# Patient Record
Sex: FEMALE | Race: BLACK OR AFRICAN AMERICAN | Employment: OTHER | URBAN - METROPOLITAN AREA
[De-identification: names, ages, dates, MRNs, and addresses within clinical notes are randomized per-mention and may not be internally consistent; named-entity substitution may affect disease eponyms.]

---

## 2017-07-31 ENCOUNTER — IP HISTORICAL/CONVERTED ENCOUNTER (OUTPATIENT)
Dept: OTHER | Age: 77
End: 2017-07-31

## 2019-09-09 ENCOUNTER — ED HISTORICAL/CONVERTED ENCOUNTER (OUTPATIENT)
Dept: OTHER | Age: 79
End: 2019-09-09

## 2020-01-01 ENCOUNTER — TELEPHONE (OUTPATIENT)
Dept: INTERNAL MEDICINE CLINIC | Age: 80
End: 2020-01-01

## 2020-01-01 ENCOUNTER — IP HISTORICAL/CONVERTED ENCOUNTER (OUTPATIENT)
Dept: OTHER | Age: 80
End: 2020-01-01

## 2020-01-01 ENCOUNTER — VIRTUAL VISIT (OUTPATIENT)
Dept: INTERNAL MEDICINE CLINIC | Age: 80
End: 2020-01-01
Payer: MEDICARE

## 2020-01-01 DIAGNOSIS — R32 URINARY INCONTINENCE, UNSPECIFIED TYPE: ICD-10-CM

## 2020-01-01 DIAGNOSIS — E78.00 PURE HYPERCHOLESTEROLEMIA: ICD-10-CM

## 2020-01-01 DIAGNOSIS — Z86.73 HISTORY OF CVA (CEREBROVASCULAR ACCIDENT): ICD-10-CM

## 2020-01-01 DIAGNOSIS — I10 ESSENTIAL HYPERTENSION: Primary | ICD-10-CM

## 2020-01-01 DIAGNOSIS — E11.65 UNCONTROLLED TYPE 2 DIABETES MELLITUS WITH HYPERGLYCEMIA (HCC): ICD-10-CM

## 2020-01-01 DIAGNOSIS — Z99.3 DEPENDENCE ON WHEELCHAIR: ICD-10-CM

## 2020-01-01 DIAGNOSIS — E55.9 VITAMIN D DEFICIENCY: ICD-10-CM

## 2020-01-01 PROCEDURE — G8427 DOCREV CUR MEDS BY ELIG CLIN: HCPCS | Performed by: INTERNAL MEDICINE

## 2020-01-01 PROCEDURE — G8432 DEP SCR NOT DOC, RNG: HCPCS | Performed by: INTERNAL MEDICINE

## 2020-01-01 PROCEDURE — 1101F PT FALLS ASSESS-DOCD LE1/YR: CPT | Performed by: INTERNAL MEDICINE

## 2020-01-01 PROCEDURE — 1090F PRES/ABSN URINE INCON ASSESS: CPT | Performed by: INTERNAL MEDICINE

## 2020-01-01 PROCEDURE — G8400 PT W/DXA NO RESULTS DOC: HCPCS | Performed by: INTERNAL MEDICINE

## 2020-01-01 PROCEDURE — G8536 NO DOC ELDER MAL SCRN: HCPCS | Performed by: INTERNAL MEDICINE

## 2020-01-01 PROCEDURE — G8421 BMI NOT CALCULATED: HCPCS | Performed by: INTERNAL MEDICINE

## 2020-01-01 PROCEDURE — 99214 OFFICE O/P EST MOD 30 MIN: CPT | Performed by: INTERNAL MEDICINE

## 2020-01-01 RX ORDER — CLOPIDOGREL BISULFATE 75 MG/1
TABLET ORAL
Qty: 90 TAB | Refills: 0 | Status: SHIPPED | OUTPATIENT
Start: 2020-01-01 | End: 2020-01-01

## 2020-01-01 RX ORDER — OMEPRAZOLE 40 MG/1
40 CAPSULE, DELAYED RELEASE ORAL DAILY
COMMUNITY
End: 2020-01-01

## 2020-01-01 RX ORDER — FUROSEMIDE 40 MG/1
1 TABLET ORAL DAILY
COMMUNITY
Start: 2017-08-25

## 2020-01-01 RX ORDER — HYDRALAZINE HYDROCHLORIDE 50 MG/1
50 TABLET, FILM COATED ORAL 2 TIMES DAILY
Qty: 180 TAB | Refills: 1 | Status: SHIPPED | OUTPATIENT
Start: 2020-01-01

## 2020-01-01 RX ORDER — GLIPIZIDE 10 MG/1
10 TABLET ORAL 2 TIMES DAILY
COMMUNITY

## 2020-01-01 RX ORDER — HYDRALAZINE HYDROCHLORIDE 25 MG/1
1 TABLET, FILM COATED ORAL 2 TIMES DAILY
COMMUNITY
Start: 2017-08-25 | End: 2021-01-01

## 2020-01-01 RX ORDER — LOSARTAN POTASSIUM 50 MG/1
1 TABLET ORAL DAILY
COMMUNITY
Start: 2017-08-25

## 2020-01-01 RX ORDER — ATORVASTATIN CALCIUM 40 MG/1
TABLET, FILM COATED ORAL DAILY
COMMUNITY
End: 2020-01-01

## 2020-01-01 RX ORDER — ATENOLOL 50 MG/1
TABLET ORAL DAILY
COMMUNITY
End: 2020-01-01

## 2020-01-01 RX ORDER — AMLODIPINE BESYLATE 10 MG/1
1 TABLET ORAL DAILY
COMMUNITY
Start: 2017-08-25 | End: 2020-01-01

## 2020-09-10 NOTE — TELEPHONE ENCOUNTER
He stated that the previous letter written to get her additional hours of care was not sufficient. He stated they actually need two different letters to submit to her insurance to try and get additional help. One letter needs to state she requires more care than the current 45 hours they provide. Because she cannot walk her do any of her own ADL's it is impossible most times for even one person to move her. The other letter is to request modifications to her home. She currently only has 1/2 bath and it is very difficult to wash her or get her to the restroom. He is asking that the letter specify this is for fall prevention and to make sure the patient is cared for considering her ailments.

## 2020-10-05 PROBLEM — R32 URINARY INCONTINENCE: Status: ACTIVE | Noted: 2020-01-01

## 2020-10-05 PROBLEM — Z99.3 DEPENDENCE ON WHEELCHAIR: Status: ACTIVE | Noted: 2020-01-01

## 2020-10-05 PROBLEM — I10 ESSENTIAL HYPERTENSION: Status: ACTIVE | Noted: 2020-01-01

## 2020-10-05 PROBLEM — Z86.73 HISTORY OF CVA (CEREBROVASCULAR ACCIDENT): Status: ACTIVE | Noted: 2020-01-01

## 2020-10-05 PROBLEM — E66.01 MORBID OBESITY (HCC): Status: ACTIVE | Noted: 2020-01-01

## 2020-10-05 PROBLEM — E78.00 PURE HYPERCHOLESTEROLEMIA: Status: ACTIVE | Noted: 2020-01-01

## 2020-10-05 PROBLEM — E11.9 DM (DIABETES MELLITUS), TYPE 2 (HCC): Status: ACTIVE | Noted: 2020-01-01

## 2020-10-06 PROBLEM — E55.9 VITAMIN D DEFICIENCY: Status: ACTIVE | Noted: 2020-01-01

## 2020-10-06 NOTE — PROGRESS NOTES
Deacon Kiran is a 78 y.o. female who was seen by synchronous (real-time) audio-video technology on 10/6/2020 for Follow Up Chronic Condition; Cholesterol Problem; Diabetes; and Hypertension She has done her virtual visit with the help of her daughter her daughter, and she lived together, recently her caretaker had some problems that she could not come for 2 weeks so her daughter took off until sixth October for 2 weeks from  23 September patient has stroke, causing left-sided hemiparesis, she has become compliant taking medicines she claims that she is taking artificial sweetener, however her  blood sugar today is 168 before breakfast she did not start taking metformin, she is taking only glipizide, she does not check blood sugar 3 times a day no blood sugar log available,, I explained the various options patient has not done any ophthalmologic checkup or, podiatric checkup being diabetic, also I rely on what they check, sugar and blood pressure at home and it is the single sugar reading number her last hemoglobin A1c was 9.3% and her triglyceride was 247 with total cholesterol 207 and . It was done in April some of the labs were done in March, her blood pressure is elevated so I increase hydralazine from 25 to 50 mg once a day and continued on losartan 50 mg once a day and amlodipine 10 mg once a day along with atenolol 50 mg once a day and she takes Lasix 40 mg once a day she takes clopidogrel 75 mg once a day along with statin 40 mg once a day I have inherited this patient from Dr. Misti Tellez,, patient is not providing reliable history regarding her diabetic diet she has sedentary life she is wheelchair dependent she cannot walk she has hemiparesis on the left side her daughter wants FMLA forms and form that she stayed out of work to be filled out by me. She needs labs and she agreed. Subjective:  
 
 
Review of Systems Review of Systems Constitutional: Negative.    
HENT: Negative for congestion and hearing loss. Eyes: Negative. Respiratory: Negative. Cardiovascular: Negative. Gastrointestinal: Negative. Genitourinary: Negative. Musculoskeletal: Negative. Skin: Negative. Neurological: Negative for dizziness, tingling, tremors, sensory change, speech change and headaches. Endo/Heme/Allergies: Negative for polydipsia. Psychiatric/Behavioral: Negative for depression. The patient does not have insomnia. Prior to Admission medications Medication Sig Start Date End Date Taking? Authorizing Provider  
hydrALAZINE (APRESOLINE) 50 mg tablet Take 1 Tab by mouth two (2) times a day. Dose increased from 25 to 50 mg twice a day  Indications: high blood pressure 10/6/20  Yes Kathy Landis MD  
losartan (COZAAR) 50 mg tablet Take 1 Tab by mouth daily. 8/25/17   Provider, Historical  
hydrALAZINE (APRESOLINE) 25 mg tablet Take 1 Tab by mouth two (2) times a day. 8/25/17   Provider, Historical  
amLODIPine (NORVASC) 10 mg tablet Take 1 Tab by mouth daily. 8/25/17   Provider, Historical  
furosemide (LASIX) 40 mg tablet Take 1 Tab by mouth daily. 8/25/17   Provider, Historical  
atenoloL (TENORMIN) 50 mg tablet Take  by mouth daily. Provider, Historical  
omeprazole (PRILOSEC) 40 mg capsule Take 40 mg by mouth daily. Provider, Historical  
atorvastatin (LIPITOR) 40 mg tablet Take  by mouth daily. Provider, Historical  
glipiZIDE (GLUCOTROL) 10 mg tablet Take 10 mg by mouth two (2) times a day. Provider, Historical  
clopidogreL (PLAVIX) 75 mg tab TAKE ONE TABLET BY MOUTH DAILY. 9/21/20   Kathy Landis MD  
 
Patient Active Problem List  
 Diagnosis Date Noted  Vitamin D deficiency 10/06/2020  
 History of CVA (cerebrovascular accident) 10/05/2020  Essential hypertension 10/05/2020  Dependence on wheelchair 10/05/2020  Morbid obesity ([address] 10/05/2020  Pure hypercholesterolemia 10/05/2020  Urinary incontinence 10/05/2020  DM (diabetes mellitus), type 2 ([facility] 10/05/2020 Current Outpatient Medications Medication Sig Dispense Refill  hydrALAZINE (APRESOLINE) 50 mg tablet Take 1 Tab by mouth two (2) times a day. Dose increased from 25 to 50 mg twice a day  Indications: high blood pressure 180 Tab 1  
 losartan (COZAAR) 50 mg tablet Take 1 Tab by mouth daily.  hydrALAZINE (APRESOLINE) 25 mg tablet Take 1 Tab by mouth two (2) times a day.  amLODIPine (NORVASC) 10 mg tablet Take 1 Tab by mouth daily.  furosemide (LASIX) 40 mg tablet Take 1 Tab by mouth daily.  atenoloL (TENORMIN) 50 mg tablet Take  by mouth daily.  omeprazole (PRILOSEC) 40 mg capsule Take 40 mg by mouth daily.  atorvastatin (LIPITOR) 40 mg tablet Take  by mouth daily.  glipiZIDE (GLUCOTROL) 10 mg tablet Take 10 mg by mouth two (2) times a day.  clopidogreL (PLAVIX) 75 mg tab TAKE ONE TABLET BY MOUTH DAILY. 90 Tab 0 Allergies Allergen Reactions  Sulfa (Sulfonamide Antibiotics) Angioedema  Contrast Dye [Iodine] Itching Past Medical History:  
Diagnosis Date  Arthritis  Congestive heart failure ([facility]  Dependence on wheelchair 10/5/2020  DM (diabetes mellitus), type 2 ([facility] 10/5/2020  Essential hypertension 10/5/2020  
 History of CVA (cerebrovascular accident) 10/5/2020  Morbid obesity ([facility] 10/5/2020  Pure hypercholesterolemia 10/5/2020  Urinary incontinence 10/5/2020 No past surgical history on file. Family History Family history unknown: Yes  
 
Social History Tobacco Use  Smoking status: Former Smoker  Tobacco comment: QUIT 50 YEARS AGO Substance Use Topics  Alcohol use: Not on file Objective: She is lying in the bed, she lives with her daughter she needs, help for maintaining her ADL and IADL having left-sided hemiparesis from CVA I could not  see her below the face she is smiling and having pleasant, face, talking with me, providing history, also her daughter is helping her,. No distress, alert awake oriented 3 times Pertinent observable physical exam findings:- 
 
 
Assessment & Plan:  
Diagnoses and all orders for this visit: 1. Essential hypertension 
-     CBC WITH AUTOMATED DIFF 
-     METABOLIC PANEL, COMPREHENSIVE 
-     TSH 3RD GENERATION 2. Uncontrolled type 2 diabetes mellitus with hyperglycemia (HCC) 
-     HEMOGLOBIN A1C WITH EAG 
-     URINALYSIS W/ RFLX MICROSCOPIC 
-     TSH 3RD GENERATION 
-     MICROALBUMIN, UR, RAND W/ MICROALB/CREAT RATIO 3. Pure hypercholesterolemia -     LIPID PANEL 4. History of CVA (cerebrovascular accident) 5. Urinary incontinence, unspecified type 6. Dependence on wheelchair 7. Vitamin D deficiency 
-     VITAMIN D, 25 HYDROXY Other orders 
-     hydrALAZINE (APRESOLINE) 50 mg tablet; Take 1 Tab by mouth two (2) times a day. Dose increased from 25 to 50 mg twice a day  Indications: high blood pressure Follow-up and Dispositions · Return in about 3 months (around 1/6/2021) for htn ,yogesh ,diabetes ,fasting labs now. Hypertension uncontrolled continued on atenolol 50 mg once a day, losartan 50 mg once a day hydralazine increased to 50 mg twice a day,. Continue amlodipine 10 mg once a day taking furosemide 40 mg once a day. Diet low in sodium and labs ordered.  
 
Type 2 diabetes mellitus uncontrolled she did not start taking metformin taking glipizide 10 mg twice a day 30 minutes before eating no blood sugar log available she checked her blood sugar, on the date that I did virtual visit today and her fasting blood sugar is 168 no log available ,no reliable dietary history or food diary available she has hyperglycemia and her last hemoglobin A1c was 9.3% in April, I will be okay with her hemoglobin A1c up to 8% she is already having stroke, labs ordered, she needs ophthalmologic checkup, she needs podiatric checkup, she needs once a year office visit,Strongly recommended and emphasized to have office visit at least once a year she needs before December, I explained to her daughter. Hypercholesteremia with CVA affecting left side of extremities continue clopidogrel 75 mg once a day and atorvastatin 40 mg once a day at bedtime. Diet low in fat. Labs ordered. I explained to her daughter who lives with her and who needs [facility] papers that she needs house physicians or somebody where she can have transportation available because her daughter claims that, she has difficulty in bringing to the office she is wheelchair dependent however she needs at least once a year office visit, she will inquire with her insurance,. I filled out the form for her daughter that she stayed out of work from 23rd September until sixth October to take care of her mother who is having stroke, independent for ADL and IADL because her own caretaker was not that she had some personal work and she stayed off from taking care of her mother patient  needs help for transportation and bringing her to the office visit also for, helping her ADL and  and some of the IADL she is alert awake oriented 3 times, however she needs, help to maintain personal hygiene and cleanliness and to protect her from, prevention of infection. Answered all her questions. Follow-up in 3 months. I spent at least 35 minutes on this visit with this established patient. We discussed the expected course, resolution and complications of the diagnosis(es) in detail. Medication risks, benefits, costs, interactions, and alternatives were discussed as indicated. I advised her to contact the office if her condition worsens, changes or fails to improve as anticipated. She expressed understanding with the diagnosis(es) and plan.   
 
 
Renée Ruiz, who was evaluated through a patient-initiated, synchronous (real-time) audio-video encounter, and/or her healthcare decision maker, is aware that it is a billable service, with coverage as determined by her insurance carrier. She provided verbal consent to proceed: Yes, and patient identification was verified. It was conducted pursuant to the emergency declaration under the [address] [place] [address] and the [employer]. A caregiver was present when appropriate. Ability to conduct physical exam was limited. I was at home. The patient was in the office.  
 
 
Dorita Villa MD

## 2021-01-01 ENCOUNTER — APPOINTMENT (OUTPATIENT)
Dept: GENERAL RADIOLOGY | Age: 81
DRG: 177 | End: 2021-01-01
Attending: INTERNAL MEDICINE
Payer: MEDICARE

## 2021-01-01 ENCOUNTER — HOSPITAL ENCOUNTER (INPATIENT)
Age: 81
LOS: 9 days | DRG: 177 | End: 2021-02-06
Attending: EMERGENCY MEDICINE | Admitting: INTERNAL MEDICINE
Payer: MEDICARE

## 2021-01-01 ENCOUNTER — APPOINTMENT (OUTPATIENT)
Dept: GENERAL RADIOLOGY | Age: 81
DRG: 177 | End: 2021-01-01
Attending: EMERGENCY MEDICINE
Payer: MEDICARE

## 2021-01-01 ENCOUNTER — HOSPITAL ENCOUNTER (INPATIENT)
Age: 81
LOS: 4 days | Discharge: HOME HEALTH | DRG: 177 | End: 2021-01-26
Attending: EMERGENCY MEDICINE | Admitting: HOSPITALIST
Payer: MEDICARE

## 2021-01-01 ENCOUNTER — APPOINTMENT (OUTPATIENT)
Dept: CT IMAGING | Age: 81
DRG: 177 | End: 2021-01-01
Attending: INTERNAL MEDICINE
Payer: MEDICARE

## 2021-01-01 VITALS
OXYGEN SATURATION: 93 % | SYSTOLIC BLOOD PRESSURE: 163 MMHG | TEMPERATURE: 98.9 F | WEIGHT: 280 LBS | HEART RATE: 74 BPM | DIASTOLIC BLOOD PRESSURE: 94 MMHG | HEIGHT: 64 IN | RESPIRATION RATE: 18 BRPM | BODY MASS INDEX: 47.8 KG/M2

## 2021-01-01 VITALS
HEART RATE: 103 BPM | RESPIRATION RATE: 24 BRPM | BODY MASS INDEX: 47.12 KG/M2 | HEIGHT: 64 IN | OXYGEN SATURATION: 77 % | DIASTOLIC BLOOD PRESSURE: 81 MMHG | WEIGHT: 276.02 LBS | SYSTOLIC BLOOD PRESSURE: 159 MMHG | TEMPERATURE: 98.3 F

## 2021-01-01 DIAGNOSIS — U07.1 ACUTE RESPIRATORY FAILURE DUE TO COVID-19 (HCC): ICD-10-CM

## 2021-01-01 DIAGNOSIS — Z86.73 HISTORY OF CVA (CEREBROVASCULAR ACCIDENT): ICD-10-CM

## 2021-01-01 DIAGNOSIS — J12.82 PNEUMONIA DUE TO COVID-19 VIRUS: Primary | ICD-10-CM

## 2021-01-01 DIAGNOSIS — U07.1 PNEUMONIA DUE TO COVID-19 VIRUS: Primary | ICD-10-CM

## 2021-01-01 DIAGNOSIS — N17.9 AKI (ACUTE KIDNEY INJURY) (HCC): ICD-10-CM

## 2021-01-01 DIAGNOSIS — U07.1 COVID-19: ICD-10-CM

## 2021-01-01 DIAGNOSIS — R79.89 ELEVATED D-DIMER: ICD-10-CM

## 2021-01-01 DIAGNOSIS — R09.02 HYPOXIA: ICD-10-CM

## 2021-01-01 DIAGNOSIS — U07.1 COVID-19 VIRUS INFECTION: Primary | ICD-10-CM

## 2021-01-01 DIAGNOSIS — R32 URINARY INCONTINENCE, UNSPECIFIED TYPE: ICD-10-CM

## 2021-01-01 DIAGNOSIS — J96.00 ACUTE RESPIRATORY FAILURE DUE TO COVID-19 (HCC): ICD-10-CM

## 2021-01-01 DIAGNOSIS — J18.9 PNEUMONIA DUE TO INFECTIOUS ORGANISM, UNSPECIFIED LATERALITY, UNSPECIFIED PART OF LUNG: ICD-10-CM

## 2021-01-01 DIAGNOSIS — E66.01 MORBID OBESITY (HCC): ICD-10-CM

## 2021-01-01 DIAGNOSIS — Z99.3 DEPENDENCE ON WHEELCHAIR: ICD-10-CM

## 2021-01-01 LAB
ALBUMIN SERPL-MCNC: 2.9 G/DL (ref 3.5–5)
ALBUMIN SERPL-MCNC: 3.1 G/DL (ref 3.5–5)
ALBUMIN SERPL-MCNC: 3.1 G/DL (ref 3.5–5)
ALBUMIN SERPL-MCNC: 3.2 G/DL (ref 3.5–5)
ALBUMIN SERPL-MCNC: 3.5 G/DL (ref 3.5–5)
ALBUMIN/GLOB SERPL: 0.7 {RATIO} (ref 1.1–2.2)
ALBUMIN/GLOB SERPL: 0.8 {RATIO} (ref 1.1–2.2)
ALBUMIN/GLOB SERPL: 0.9 {RATIO} (ref 1.1–2.2)
ALP SERPL-CCNC: 68 U/L (ref 45–117)
ALP SERPL-CCNC: 78 U/L (ref 45–117)
ALP SERPL-CCNC: 85 U/L (ref 45–117)
ALT SERPL-CCNC: 22 U/L (ref 12–78)
ALT SERPL-CCNC: 22 U/L (ref 12–78)
ALT SERPL-CCNC: 26 U/L (ref 12–78)
ANION GAP SERPL CALC-SCNC: 10 MMOL/L (ref 5–15)
ANION GAP SERPL CALC-SCNC: 10 MMOL/L (ref 5–15)
ANION GAP SERPL CALC-SCNC: 7 MMOL/L (ref 5–15)
ANION GAP SERPL CALC-SCNC: 7 MMOL/L (ref 5–15)
ANION GAP SERPL CALC-SCNC: 8 MMOL/L (ref 5–15)
ANION GAP SERPL CALC-SCNC: 9 MMOL/L (ref 5–15)
ANION GAP SERPL CALC-SCNC: 9 MMOL/L (ref 5–15)
APTT PPP: 30.6 SEC (ref 23–35.7)
AST SERPL W P-5'-P-CCNC: 22 U/L (ref 15–37)
AST SERPL W P-5'-P-CCNC: 23 U/L (ref 15–37)
AST SERPL W P-5'-P-CCNC: 32 U/L (ref 15–37)
ATRIAL RATE: 102 BPM
ATRIAL RATE: 113 BPM
ATRIAL RATE: 70 BPM
BACTERIA SPEC CULT: ABNORMAL
BACTERIA SPEC CULT: ABNORMAL
BACTERIA SPEC CULT: NORMAL
BACTERIA SPEC CULT: NORMAL
BASOPHILS # BLD: 0 K/UL (ref 0–0.1)
BASOPHILS NFR BLD: 0 % (ref 0–1)
BILIRUB SERPL-MCNC: 0.5 MG/DL (ref 0.2–1)
BILIRUB SERPL-MCNC: 0.8 MG/DL (ref 0.2–1)
BILIRUB SERPL-MCNC: 1.3 MG/DL (ref 0.2–1)
BNP SERPL-MCNC: 1026 PG/ML
BNP SERPL-MCNC: 2346 PG/ML
BNP SERPL-MCNC: 355 PG/ML
BNP SERPL-MCNC: 609 PG/ML
BUN SERPL-MCNC: 12 MG/DL (ref 6–20)
BUN SERPL-MCNC: 30 MG/DL (ref 6–20)
BUN SERPL-MCNC: 38 MG/DL (ref 6–20)
BUN SERPL-MCNC: 41 MG/DL (ref 6–20)
BUN SERPL-MCNC: 46 MG/DL (ref 6–20)
BUN SERPL-MCNC: 47 MG/DL (ref 6–20)
BUN SERPL-MCNC: 52 MG/DL (ref 6–20)
BUN SERPL-MCNC: 55 MG/DL (ref 6–20)
BUN SERPL-MCNC: 77 MG/DL (ref 6–20)
BUN/CREAT SERPL: 12 (ref 12–20)
BUN/CREAT SERPL: 19 (ref 12–20)
BUN/CREAT SERPL: 26 (ref 12–20)
BUN/CREAT SERPL: 27 (ref 12–20)
BUN/CREAT SERPL: 31 (ref 12–20)
BUN/CREAT SERPL: 35 (ref 12–20)
BUN/CREAT SERPL: 41 (ref 12–20)
CA-I BLD-MCNC: 8.9 MG/DL (ref 8.5–10.1)
CA-I BLD-MCNC: 9.1 MG/DL (ref 8.5–10.1)
CA-I BLD-MCNC: 9.1 MG/DL (ref 8.5–10.1)
CA-I BLD-MCNC: 9.2 MG/DL (ref 8.5–10.1)
CA-I BLD-MCNC: 9.2 MG/DL (ref 8.5–10.1)
CA-I BLD-MCNC: 9.4 MG/DL (ref 8.5–10.1)
CA-I BLD-MCNC: 9.4 MG/DL (ref 8.5–10.1)
CA-I BLD-MCNC: 9.5 MG/DL (ref 8.5–10.1)
CA-I BLD-MCNC: 9.9 MG/DL (ref 8.5–10.1)
CALCULATED P AXIS, ECG09: 83 DEGREES
CALCULATED R AXIS, ECG10: -43 DEGREES
CALCULATED R AXIS, ECG10: -46 DEGREES
CALCULATED R AXIS, ECG10: -48 DEGREES
CALCULATED T AXIS, ECG11: 109 DEGREES
CALCULATED T AXIS, ECG11: 46 DEGREES
CALCULATED T AXIS, ECG11: 82 DEGREES
CHLORIDE SERPL-SCNC: 100 MMOL/L (ref 97–108)
CHLORIDE SERPL-SCNC: 102 MMOL/L (ref 97–108)
CHLORIDE SERPL-SCNC: 104 MMOL/L (ref 97–108)
CHLORIDE SERPL-SCNC: 105 MMOL/L (ref 97–108)
CHLORIDE SERPL-SCNC: 106 MMOL/L (ref 97–108)
CHLORIDE SERPL-SCNC: 108 MMOL/L (ref 97–108)
CHLORIDE SERPL-SCNC: 110 MMOL/L (ref 97–108)
CO2 SERPL-SCNC: 24 MMOL/L (ref 21–32)
CO2 SERPL-SCNC: 25 MMOL/L (ref 21–32)
CO2 SERPL-SCNC: 25 MMOL/L (ref 21–32)
CO2 SERPL-SCNC: 26 MMOL/L (ref 21–32)
CO2 SERPL-SCNC: 26 MMOL/L (ref 21–32)
CO2 SERPL-SCNC: 29 MMOL/L (ref 21–32)
CO2 SERPL-SCNC: 30 MMOL/L (ref 21–32)
CO2 SERPL-SCNC: 30 MMOL/L (ref 21–32)
CO2 SERPL-SCNC: 31 MMOL/L (ref 21–32)
COVID-19 RAPID TEST, COVR: DETECTED
CREAT SERPL-MCNC: 1.04 MG/DL (ref 0.55–1.02)
CREAT SERPL-MCNC: 1.33 MG/DL (ref 0.55–1.02)
CREAT SERPL-MCNC: 1.44 MG/DL (ref 0.55–1.02)
CREAT SERPL-MCNC: 1.47 MG/DL (ref 0.55–1.02)
CREAT SERPL-MCNC: 1.5 MG/DL (ref 0.55–1.02)
CREAT SERPL-MCNC: 1.5 MG/DL (ref 0.55–1.02)
CREAT SERPL-MCNC: 1.56 MG/DL (ref 0.55–1.02)
CREAT SERPL-MCNC: 1.61 MG/DL (ref 0.55–1.02)
CREAT SERPL-MCNC: 1.9 MG/DL (ref 0.55–1.02)
CRP SERPL-MCNC: 1.35 MG/DL (ref 0–0.6)
CRP SERPL-MCNC: 1.85 MG/DL (ref 0–0.6)
CRP SERPL-MCNC: 2.87 MG/DL (ref 0–0.6)
CRP SERPL-MCNC: 4.87 MG/DL (ref 0–0.6)
CRP SERPL-MCNC: <0.29 MG/DL (ref 0–0.6)
D DIMER PPP FEU-MCNC: 3.22 UG/ML(FEU)
DIAGNOSIS, 93000: NORMAL
DIFFERENTIAL METHOD BLD: ABNORMAL
EOSINOPHIL # BLD: 0 K/UL (ref 0–0.4)
EOSINOPHIL NFR BLD: 0 % (ref 0–7)
ERYTHROCYTE [DISTWIDTH] IN BLOOD BY AUTOMATED COUNT: 14.5 % (ref 11.5–14.5)
ERYTHROCYTE [DISTWIDTH] IN BLOOD BY AUTOMATED COUNT: 14.6 % (ref 11.5–14.5)
ERYTHROCYTE [DISTWIDTH] IN BLOOD BY AUTOMATED COUNT: 14.8 % (ref 11.5–14.5)
ERYTHROCYTE [DISTWIDTH] IN BLOOD BY AUTOMATED COUNT: 14.9 % (ref 11.5–14.5)
ERYTHROCYTE [DISTWIDTH] IN BLOOD BY AUTOMATED COUNT: 14.9 % (ref 11.5–14.5)
ERYTHROCYTE [DISTWIDTH] IN BLOOD BY AUTOMATED COUNT: 15 % (ref 11.5–14.5)
ERYTHROCYTE [DISTWIDTH] IN BLOOD BY AUTOMATED COUNT: 15.2 % (ref 11.5–14.5)
ERYTHROCYTE [DISTWIDTH] IN BLOOD BY AUTOMATED COUNT: 15.7 % (ref 11.5–14.5)
EST. AVERAGE GLUCOSE BLD GHB EST-MCNC: 154 MG/DL
FERRITIN SERPL-MCNC: 235 NG/ML (ref 8–252)
FERRITIN SERPL-MCNC: 333 NG/ML (ref 8–252)
FERRITIN SERPL-MCNC: 730 NG/ML (ref 8–252)
FLUAV AG NPH QL IA: NEGATIVE
FLUBV AG NOSE QL IA: NEGATIVE
GLOBULIN SER CALC-MCNC: 3.7 G/DL (ref 2–4)
GLOBULIN SER CALC-MCNC: 4.3 G/DL (ref 2–4)
GLOBULIN SER CALC-MCNC: 4.5 G/DL (ref 2–4)
GLUCOSE BLD STRIP.AUTO-MCNC: 155 MG/DL (ref 65–100)
GLUCOSE BLD STRIP.AUTO-MCNC: 155 MG/DL (ref 65–100)
GLUCOSE BLD STRIP.AUTO-MCNC: 207 MG/DL (ref 65–100)
GLUCOSE BLD STRIP.AUTO-MCNC: 228 MG/DL (ref 65–100)
GLUCOSE BLD STRIP.AUTO-MCNC: 232 MG/DL (ref 65–100)
GLUCOSE BLD STRIP.AUTO-MCNC: 232 MG/DL (ref 65–100)
GLUCOSE BLD STRIP.AUTO-MCNC: 239 MG/DL (ref 65–100)
GLUCOSE BLD STRIP.AUTO-MCNC: 241 MG/DL (ref 65–100)
GLUCOSE BLD STRIP.AUTO-MCNC: 241 MG/DL (ref 65–100)
GLUCOSE BLD STRIP.AUTO-MCNC: 242 MG/DL (ref 65–100)
GLUCOSE BLD STRIP.AUTO-MCNC: 243 MG/DL (ref 65–100)
GLUCOSE BLD STRIP.AUTO-MCNC: 244 MG/DL (ref 65–100)
GLUCOSE BLD STRIP.AUTO-MCNC: 246 MG/DL (ref 65–100)
GLUCOSE BLD STRIP.AUTO-MCNC: 248 MG/DL (ref 65–100)
GLUCOSE BLD STRIP.AUTO-MCNC: 249 MG/DL (ref 65–100)
GLUCOSE BLD STRIP.AUTO-MCNC: 251 MG/DL (ref 65–100)
GLUCOSE BLD STRIP.AUTO-MCNC: 254 MG/DL (ref 65–100)
GLUCOSE BLD STRIP.AUTO-MCNC: 256 MG/DL (ref 65–100)
GLUCOSE BLD STRIP.AUTO-MCNC: 260 MG/DL (ref 65–100)
GLUCOSE BLD STRIP.AUTO-MCNC: 261 MG/DL (ref 65–100)
GLUCOSE BLD STRIP.AUTO-MCNC: 261 MG/DL (ref 65–100)
GLUCOSE BLD STRIP.AUTO-MCNC: 265 MG/DL (ref 65–100)
GLUCOSE BLD STRIP.AUTO-MCNC: 266 MG/DL (ref 65–100)
GLUCOSE BLD STRIP.AUTO-MCNC: 267 MG/DL (ref 65–100)
GLUCOSE BLD STRIP.AUTO-MCNC: 271 MG/DL (ref 65–100)
GLUCOSE BLD STRIP.AUTO-MCNC: 279 MG/DL (ref 65–100)
GLUCOSE BLD STRIP.AUTO-MCNC: 287 MG/DL (ref 65–100)
GLUCOSE BLD STRIP.AUTO-MCNC: 294 MG/DL (ref 65–100)
GLUCOSE BLD STRIP.AUTO-MCNC: 294 MG/DL (ref 65–100)
GLUCOSE BLD STRIP.AUTO-MCNC: 309 MG/DL (ref 65–100)
GLUCOSE BLD STRIP.AUTO-MCNC: 314 MG/DL (ref 65–100)
GLUCOSE BLD STRIP.AUTO-MCNC: 317 MG/DL (ref 65–100)
GLUCOSE BLD STRIP.AUTO-MCNC: 339 MG/DL (ref 65–100)
GLUCOSE BLD STRIP.AUTO-MCNC: 372 MG/DL (ref 65–100)
GLUCOSE BLD STRIP.AUTO-MCNC: 383 MG/DL (ref 65–100)
GLUCOSE BLD STRIP.AUTO-MCNC: 398 MG/DL (ref 65–100)
GLUCOSE SERPL-MCNC: 130 MG/DL (ref 65–100)
GLUCOSE SERPL-MCNC: 173 MG/DL (ref 65–100)
GLUCOSE SERPL-MCNC: 186 MG/DL (ref 65–100)
GLUCOSE SERPL-MCNC: 234 MG/DL (ref 65–100)
GLUCOSE SERPL-MCNC: 242 MG/DL (ref 65–100)
GLUCOSE SERPL-MCNC: 265 MG/DL (ref 65–100)
GLUCOSE SERPL-MCNC: 267 MG/DL (ref 65–100)
GLUCOSE SERPL-MCNC: 317 MG/DL (ref 65–100)
GLUCOSE SERPL-MCNC: 324 MG/DL (ref 65–100)
HBA1C MFR BLD: 7 % (ref 4–5.6)
HCT VFR BLD AUTO: 34.3 % (ref 35–47)
HCT VFR BLD AUTO: 35.7 % (ref 35–47)
HCT VFR BLD AUTO: 35.8 % (ref 35–47)
HCT VFR BLD AUTO: 36 % (ref 35–47)
HCT VFR BLD AUTO: 36.1 % (ref 35–47)
HCT VFR BLD AUTO: 37.5 % (ref 35–47)
HCT VFR BLD AUTO: 38.2 % (ref 35–47)
HCT VFR BLD AUTO: 40.1 % (ref 35–47)
HGB BLD-MCNC: 11.5 G/DL (ref 11.5–16)
HGB BLD-MCNC: 11.6 G/DL (ref 11.5–16)
HGB BLD-MCNC: 11.8 G/DL (ref 11.5–16)
HGB BLD-MCNC: 11.9 G/DL (ref 11.5–16)
HGB BLD-MCNC: 12.1 G/DL (ref 11.5–16)
HGB BLD-MCNC: 12.3 G/DL (ref 11.5–16)
HGB BLD-MCNC: 12.7 G/DL (ref 11.5–16)
HGB BLD-MCNC: 13.6 G/DL (ref 11.5–16)
IMM GRANULOCYTES # BLD AUTO: 0 K/UL (ref 0–0.04)
IMM GRANULOCYTES # BLD AUTO: 0.1 K/UL (ref 0–0.04)
IMM GRANULOCYTES NFR BLD AUTO: 0 % (ref 0–0.5)
IMM GRANULOCYTES NFR BLD AUTO: 1 % (ref 0–0.5)
INR PPP: 1 (ref 0.9–1.1)
LACTATE SERPL-SCNC: 0.9 MMOL/L (ref 0.4–2)
LACTATE SERPL-SCNC: 1.1 MMOL/L (ref 0.4–2)
LDH SERPL L TO P-CCNC: 280 U/L (ref 81–246)
LDH SERPL L TO P-CCNC: 489 U/L (ref 81–246)
LDH SERPL L TO P-CCNC: 511 U/L (ref 81–246)
LDH SERPL L TO P-CCNC: 720 U/L (ref 81–246)
LYMPHOCYTES # BLD: 0.7 K/UL (ref 0.8–3.5)
LYMPHOCYTES # BLD: 0.7 K/UL (ref 0.8–3.5)
LYMPHOCYTES # BLD: 0.8 K/UL (ref 0.8–3.5)
LYMPHOCYTES # BLD: 0.9 K/UL (ref 0.8–3.5)
LYMPHOCYTES # BLD: 0.9 K/UL (ref 0.8–3.5)
LYMPHOCYTES # BLD: 1.1 K/UL (ref 0.8–3.5)
LYMPHOCYTES # BLD: 1.3 K/UL (ref 0.8–3.5)
LYMPHOCYTES # BLD: 1.4 K/UL (ref 0.8–3.5)
LYMPHOCYTES NFR BLD: 11 % (ref 12–49)
LYMPHOCYTES NFR BLD: 12 % (ref 12–49)
LYMPHOCYTES NFR BLD: 17 % (ref 12–49)
LYMPHOCYTES NFR BLD: 21 % (ref 12–49)
LYMPHOCYTES NFR BLD: 30 % (ref 12–49)
LYMPHOCYTES NFR BLD: 31 % (ref 12–49)
LYMPHOCYTES NFR BLD: 6 % (ref 12–49)
LYMPHOCYTES NFR BLD: 7 % (ref 12–49)
MAGNESIUM SERPL-MCNC: 1.6 MG/DL (ref 1.6–2.4)
MCH RBC QN AUTO: 26 PG (ref 26–34)
MCH RBC QN AUTO: 26 PG (ref 26–34)
MCH RBC QN AUTO: 26.2 PG (ref 26–34)
MCH RBC QN AUTO: 26.4 PG (ref 26–34)
MCH RBC QN AUTO: 26.4 PG (ref 26–34)
MCH RBC QN AUTO: 26.5 PG (ref 26–34)
MCH RBC QN AUTO: 26.5 PG (ref 26–34)
MCH RBC QN AUTO: 26.7 PG (ref 26–34)
MCHC RBC AUTO-ENTMCNC: 32.5 G/DL (ref 30–36.5)
MCHC RBC AUTO-ENTMCNC: 32.8 G/DL (ref 30–36.5)
MCHC RBC AUTO-ENTMCNC: 32.8 G/DL (ref 30–36.5)
MCHC RBC AUTO-ENTMCNC: 33.2 G/DL (ref 30–36.5)
MCHC RBC AUTO-ENTMCNC: 33.2 G/DL (ref 30–36.5)
MCHC RBC AUTO-ENTMCNC: 33.5 G/DL (ref 30–36.5)
MCHC RBC AUTO-ENTMCNC: 33.5 G/DL (ref 30–36.5)
MCHC RBC AUTO-ENTMCNC: 33.9 G/DL (ref 30–36.5)
MCV RBC AUTO: 77.3 FL (ref 80–99)
MCV RBC AUTO: 78.2 FL (ref 80–99)
MCV RBC AUTO: 78.8 FL (ref 80–99)
MCV RBC AUTO: 78.9 FL (ref 80–99)
MCV RBC AUTO: 79.6 FL (ref 80–99)
MCV RBC AUTO: 80 FL (ref 80–99)
MCV RBC AUTO: 80.7 FL (ref 80–99)
MCV RBC AUTO: 81.3 FL (ref 80–99)
MONOCYTES # BLD: 0.2 K/UL (ref 0–1)
MONOCYTES # BLD: 0.3 K/UL (ref 0–1)
MONOCYTES # BLD: 0.3 K/UL (ref 0–1)
MONOCYTES # BLD: 0.4 K/UL (ref 0–1)
MONOCYTES # BLD: 0.5 K/UL (ref 0–1)
MONOCYTES # BLD: 0.7 K/UL (ref 0–1)
MONOCYTES NFR BLD: 10 % (ref 5–13)
MONOCYTES NFR BLD: 2 % (ref 5–13)
MONOCYTES NFR BLD: 2 % (ref 5–13)
MONOCYTES NFR BLD: 7 % (ref 5–13)
MONOCYTES NFR BLD: 8 % (ref 5–13)
MONOCYTES NFR BLD: 8 % (ref 5–13)
NEUTS SEG # BLD: 10.2 K/UL (ref 1.8–8)
NEUTS SEG # BLD: 11.9 K/UL (ref 1.8–8)
NEUTS SEG # BLD: 2.2 K/UL (ref 1.8–8)
NEUTS SEG # BLD: 2.6 K/UL (ref 1.8–8)
NEUTS SEG # BLD: 3.1 K/UL (ref 1.8–8)
NEUTS SEG # BLD: 3.7 K/UL (ref 1.8–8)
NEUTS SEG # BLD: 5.7 K/UL (ref 1.8–8)
NEUTS SEG # BLD: 7.9 K/UL (ref 1.8–8)
NEUTS SEG NFR BLD: 59 % (ref 32–75)
NEUTS SEG NFR BLD: 62 % (ref 32–75)
NEUTS SEG NFR BLD: 70 % (ref 32–75)
NEUTS SEG NFR BLD: 74 % (ref 32–75)
NEUTS SEG NFR BLD: 80 % (ref 32–75)
NEUTS SEG NFR BLD: 85 % (ref 32–75)
NEUTS SEG NFR BLD: 86 % (ref 32–75)
NEUTS SEG NFR BLD: 91 % (ref 32–75)
P-R INTERVAL, ECG05: 196 MS
PERFORMED BY, TECHID: ABNORMAL
PHOSPHATE SERPL-MCNC: 2.5 MG/DL (ref 2.6–4.7)
PHOSPHATE SERPL-MCNC: 2.8 MG/DL (ref 2.6–4.7)
PHOSPHATE SERPL-MCNC: 3.1 MG/DL (ref 2.6–4.7)
PHOSPHATE SERPL-MCNC: 3.5 MG/DL (ref 2.6–4.7)
PHOSPHATE SERPL-MCNC: 3.8 MG/DL (ref 2.6–4.7)
PHOSPHATE SERPL-MCNC: 4.4 MG/DL (ref 2.6–4.7)
PLATELET # BLD AUTO: 122 K/UL (ref 150–400)
PLATELET # BLD AUTO: 172 K/UL (ref 150–400)
PLATELET # BLD AUTO: 176 K/UL (ref 150–400)
PLATELET # BLD AUTO: 186 K/UL (ref 150–400)
PLATELET # BLD AUTO: 214 K/UL (ref 150–400)
PLATELET # BLD AUTO: 220 K/UL (ref 150–400)
PLATELET # BLD AUTO: 222 K/UL (ref 150–400)
PLATELET # BLD AUTO: 223 K/UL (ref 150–400)
PMV BLD AUTO: 10 FL (ref 8.9–12.9)
PMV BLD AUTO: 10.2 FL (ref 8.9–12.9)
PMV BLD AUTO: 10.4 FL (ref 8.9–12.9)
PMV BLD AUTO: 11 FL (ref 8.9–12.9)
PMV BLD AUTO: 11 FL (ref 8.9–12.9)
POTASSIUM SERPL-SCNC: 3.7 MMOL/L (ref 3.5–5.1)
POTASSIUM SERPL-SCNC: 3.9 MMOL/L (ref 3.5–5.1)
POTASSIUM SERPL-SCNC: 4.2 MMOL/L (ref 3.5–5.1)
POTASSIUM SERPL-SCNC: 4.4 MMOL/L (ref 3.5–5.1)
POTASSIUM SERPL-SCNC: 4.4 MMOL/L (ref 3.5–5.1)
POTASSIUM SERPL-SCNC: 4.7 MMOL/L (ref 3.5–5.1)
POTASSIUM SERPL-SCNC: 4.7 MMOL/L (ref 3.5–5.1)
PROCALCITONIN SERPL-MCNC: <0.05 NG/ML
PROCALCITONIN SERPL-MCNC: <0.05 NG/ML
PROT SERPL-MCNC: 7.2 G/DL (ref 6.4–8.2)
PROT SERPL-MCNC: 7.5 G/DL (ref 6.4–8.2)
PROT SERPL-MCNC: 8 G/DL (ref 6.4–8.2)
PROTHROMBIN TIME: 12.9 SEC (ref 11.9–14.7)
Q-T INTERVAL, ECG07: 344 MS
Q-T INTERVAL, ECG07: 376 MS
Q-T INTERVAL, ECG07: 430 MS
QRS DURATION, ECG06: 106 MS
QRS DURATION, ECG06: 112 MS
QRS DURATION, ECG06: 114 MS
QTC CALCULATION (BEZET), ECG08: 464 MS
QTC CALCULATION (BEZET), ECG08: 496 MS
QTC CALCULATION (BEZET), ECG08: 515 MS
RBC # BLD AUTO: 4.35 M/UL (ref 3.8–5.2)
RBC # BLD AUTO: 4.46 M/UL (ref 3.8–5.2)
RBC # BLD AUTO: 4.46 M/UL (ref 3.8–5.2)
RBC # BLD AUTO: 4.58 M/UL (ref 3.8–5.2)
RBC # BLD AUTO: 4.58 M/UL (ref 3.8–5.2)
RBC # BLD AUTO: 4.61 M/UL (ref 3.8–5.2)
RBC # BLD AUTO: 4.8 M/UL (ref 3.8–5.2)
RBC # BLD AUTO: 5.19 M/UL (ref 3.8–5.2)
SARS-COV-2, COV2: NORMAL
SODIUM SERPL-SCNC: 137 MMOL/L (ref 136–145)
SODIUM SERPL-SCNC: 139 MMOL/L (ref 136–145)
SODIUM SERPL-SCNC: 139 MMOL/L (ref 136–145)
SODIUM SERPL-SCNC: 141 MMOL/L (ref 136–145)
SODIUM SERPL-SCNC: 142 MMOL/L (ref 136–145)
SODIUM SERPL-SCNC: 142 MMOL/L (ref 136–145)
SODIUM SERPL-SCNC: 143 MMOL/L (ref 136–145)
SPECIAL REQUESTS,SREQ: ABNORMAL
SPECIAL REQUESTS,SREQ: NORMAL
SPECIAL REQUESTS,SREQ: NORMAL
SPECIMEN SOURCE: ABNORMAL
THERAPEUTIC RANGE,PTTT: NORMAL SEC
TROPONIN I SERPL-MCNC: <0.05 NG/ML
TROPONIN I SERPL-MCNC: <0.05 NG/ML
VANCOMYCIN SERPL-MCNC: 13.5 UG/ML
VANCOMYCIN SERPL-MCNC: 9.4 UG/ML
VENTRICULAR RATE, ECG03: 113 BPM
VENTRICULAR RATE, ECG03: 125 BPM
VENTRICULAR RATE, ECG03: 70 BPM
WBC # BLD AUTO: 11.7 K/UL (ref 3.6–11)
WBC # BLD AUTO: 13 K/UL (ref 3.6–11)
WBC # BLD AUTO: 3.6 K/UL (ref 3.6–11)
WBC # BLD AUTO: 4.4 K/UL (ref 3.6–11)
WBC # BLD AUTO: 4.4 K/UL (ref 3.6–11)
WBC # BLD AUTO: 4.9 K/UL (ref 3.6–11)
WBC # BLD AUTO: 7.1 K/UL (ref 3.6–11)
WBC # BLD AUTO: 9.3 K/UL (ref 3.6–11)

## 2021-01-01 PROCEDURE — 74011250637 HC RX REV CODE- 250/637: Performed by: HOSPITALIST

## 2021-01-01 PROCEDURE — 74011636637 HC RX REV CODE- 636/637: Performed by: HOSPITALIST

## 2021-01-01 PROCEDURE — 86140 C-REACTIVE PROTEIN: CPT

## 2021-01-01 PROCEDURE — 65270000032 HC RM SEMIPRIVATE

## 2021-01-01 PROCEDURE — 99222 1ST HOSP IP/OBS MODERATE 55: CPT | Performed by: INTERNAL MEDICINE

## 2021-01-01 PROCEDURE — 84145 PROCALCITONIN (PCT): CPT

## 2021-01-01 PROCEDURE — 74011250637 HC RX REV CODE- 250/637: Performed by: INTERNAL MEDICINE

## 2021-01-01 PROCEDURE — 83615 LACTATE (LD) (LDH) ENZYME: CPT

## 2021-01-01 PROCEDURE — 74011636637 HC RX REV CODE- 636/637: Performed by: INTERNAL MEDICINE

## 2021-01-01 PROCEDURE — 84484 ASSAY OF TROPONIN QUANT: CPT

## 2021-01-01 PROCEDURE — 80053 COMPREHEN METABOLIC PANEL: CPT

## 2021-01-01 PROCEDURE — 36415 COLL VENOUS BLD VENIPUNCTURE: CPT

## 2021-01-01 PROCEDURE — 97530 THERAPEUTIC ACTIVITIES: CPT

## 2021-01-01 PROCEDURE — 74011636637 HC RX REV CODE- 636/637: Performed by: FAMILY MEDICINE

## 2021-01-01 PROCEDURE — 94762 N-INVAS EAR/PLS OXIMTRY CONT: CPT

## 2021-01-01 PROCEDURE — 99232 SBSQ HOSP IP/OBS MODERATE 35: CPT | Performed by: INTERNAL MEDICINE

## 2021-01-01 PROCEDURE — 65270000029 HC RM PRIVATE

## 2021-01-01 PROCEDURE — 82728 ASSAY OF FERRITIN: CPT

## 2021-01-01 PROCEDURE — 80069 RENAL FUNCTION PANEL: CPT

## 2021-01-01 PROCEDURE — 94760 N-INVAS EAR/PLS OXIMETRY 1: CPT

## 2021-01-01 PROCEDURE — 71250 CT THORAX DX C-: CPT

## 2021-01-01 PROCEDURE — 74011250636 HC RX REV CODE- 250/636: Performed by: FAMILY MEDICINE

## 2021-01-01 PROCEDURE — 97161 PT EVAL LOW COMPLEX 20 MIN: CPT

## 2021-01-01 PROCEDURE — 94640 AIRWAY INHALATION TREATMENT: CPT

## 2021-01-01 PROCEDURE — 74011250636 HC RX REV CODE- 250/636: Performed by: INTERNAL MEDICINE

## 2021-01-01 PROCEDURE — 77010033678 HC OXYGEN DAILY

## 2021-01-01 PROCEDURE — 74011000258 HC RX REV CODE- 258: Performed by: HOSPITALIST

## 2021-01-01 PROCEDURE — 77010033711 HC HIGH FLOW OXYGEN

## 2021-01-01 PROCEDURE — 85025 COMPLETE CBC W/AUTO DIFF WBC: CPT

## 2021-01-01 PROCEDURE — 97165 OT EVAL LOW COMPLEX 30 MIN: CPT

## 2021-01-01 PROCEDURE — 83880 ASSAY OF NATRIURETIC PEPTIDE: CPT

## 2021-01-01 PROCEDURE — 71045 X-RAY EXAM CHEST 1 VIEW: CPT

## 2021-01-01 PROCEDURE — 74011000250 HC RX REV CODE- 250: Performed by: INTERNAL MEDICINE

## 2021-01-01 PROCEDURE — 82962 GLUCOSE BLOOD TEST: CPT

## 2021-01-01 PROCEDURE — 74011250636 HC RX REV CODE- 250/636: Performed by: HOSPITALIST

## 2021-01-01 PROCEDURE — 96374 THER/PROPH/DIAG INJ IV PUSH: CPT

## 2021-01-01 PROCEDURE — 93005 ELECTROCARDIOGRAM TRACING: CPT

## 2021-01-01 PROCEDURE — 74011000250 HC RX REV CODE- 250: Performed by: HOSPITALIST

## 2021-01-01 PROCEDURE — 74011000258 HC RX REV CODE- 258: Performed by: INTERNAL MEDICINE

## 2021-01-01 PROCEDURE — 87040 BLOOD CULTURE FOR BACTERIA: CPT

## 2021-01-01 PROCEDURE — 83605 ASSAY OF LACTIC ACID: CPT

## 2021-01-01 PROCEDURE — 74011000250 HC RX REV CODE- 250: Performed by: FAMILY MEDICINE

## 2021-01-01 PROCEDURE — 99285 EMERGENCY DEPT VISIT HI MDM: CPT

## 2021-01-01 PROCEDURE — 87804 INFLUENZA ASSAY W/OPTIC: CPT

## 2021-01-01 PROCEDURE — 96375 TX/PRO/DX INJ NEW DRUG ADDON: CPT

## 2021-01-01 PROCEDURE — 74011250637 HC RX REV CODE- 250/637: Performed by: FAMILY MEDICINE

## 2021-01-01 PROCEDURE — 85610 PROTHROMBIN TIME: CPT

## 2021-01-01 PROCEDURE — 74011000250 HC RX REV CODE- 250: Performed by: EMERGENCY MEDICINE

## 2021-01-01 PROCEDURE — 99221 1ST HOSP IP/OBS SF/LOW 40: CPT | Performed by: INTERNAL MEDICINE

## 2021-01-01 PROCEDURE — 85379 FIBRIN DEGRADATION QUANT: CPT

## 2021-01-01 PROCEDURE — 5A09357 ASSISTANCE WITH RESPIRATORY VENTILATION, LESS THAN 24 CONSECUTIVE HOURS, CONTINUOUS POSITIVE AIRWAY PRESSURE: ICD-10-PCS | Performed by: INTERNAL MEDICINE

## 2021-01-01 PROCEDURE — 80202 ASSAY OF VANCOMYCIN: CPT

## 2021-01-01 PROCEDURE — 83735 ASSAY OF MAGNESIUM: CPT

## 2021-01-01 PROCEDURE — 74011250636 HC RX REV CODE- 250/636: Performed by: EMERGENCY MEDICINE

## 2021-01-01 PROCEDURE — XW033E5 INTRODUCTION OF REMDESIVIR ANTI-INFECTIVE INTO PERIPHERAL VEIN, PERCUTANEOUS APPROACH, NEW TECHNOLOGY GROUP 5: ICD-10-PCS | Performed by: INTERNAL MEDICINE

## 2021-01-01 PROCEDURE — 94761 N-INVAS EAR/PLS OXIMETRY MLT: CPT

## 2021-01-01 PROCEDURE — 97162 PT EVAL MOD COMPLEX 30 MIN: CPT

## 2021-01-01 PROCEDURE — 83036 HEMOGLOBIN GLYCOSYLATED A1C: CPT

## 2021-01-01 PROCEDURE — 87635 SARS-COV-2 COVID-19 AMP PRB: CPT

## 2021-01-01 PROCEDURE — 94660 CPAP INITIATION&MGMT: CPT

## 2021-01-01 PROCEDURE — 3E0F7SF INTRODUCTION OF OTHER GAS INTO RESPIRATORY TRACT, VIA NATURAL OR ARTIFICIAL OPENING: ICD-10-PCS | Performed by: INTERNAL MEDICINE

## 2021-01-01 PROCEDURE — 99284 EMERGENCY DEPT VISIT MOD MDM: CPT

## 2021-01-01 PROCEDURE — 85730 THROMBOPLASTIN TIME PARTIAL: CPT

## 2021-01-01 RX ORDER — ATENOLOL 50 MG/1
50 TABLET ORAL DAILY
Qty: 30 TAB | Refills: 0 | Status: SHIPPED | OUTPATIENT
Start: 2021-01-01

## 2021-01-01 RX ORDER — LOSARTAN POTASSIUM 50 MG/1
50 TABLET ORAL DAILY
Status: CANCELLED | OUTPATIENT
Start: 2021-01-01

## 2021-01-01 RX ORDER — LOSARTAN POTASSIUM 50 MG/1
50 TABLET ORAL DAILY
Status: DISCONTINUED | OUTPATIENT
Start: 2021-01-01 | End: 2021-01-01 | Stop reason: HOSPADM

## 2021-01-01 RX ORDER — FUROSEMIDE 10 MG/ML
60 INJECTION INTRAMUSCULAR; INTRAVENOUS EVERY 8 HOURS
Status: COMPLETED | OUTPATIENT
Start: 2021-01-01 | End: 2021-01-01

## 2021-01-01 RX ORDER — MELATONIN
2000 DAILY
Status: DISCONTINUED | OUTPATIENT
Start: 2021-01-01 | End: 2021-01-01

## 2021-01-01 RX ORDER — SODIUM CHLORIDE 0.9 % (FLUSH) 0.9 %
5-40 SYRINGE (ML) INJECTION AS NEEDED
Status: DISCONTINUED | OUTPATIENT
Start: 2021-01-01 | End: 2021-01-01 | Stop reason: HOSPADM

## 2021-01-01 RX ORDER — ALBUTEROL SULFATE 90 UG/1
2 AEROSOL, METERED RESPIRATORY (INHALATION)
Status: DISCONTINUED | OUTPATIENT
Start: 2021-01-01 | End: 2021-01-01 | Stop reason: HOSPADM

## 2021-01-01 RX ORDER — KETOROLAC TROMETHAMINE 10 MG/1
10 TABLET, FILM COATED ORAL
Status: DISCONTINUED | OUTPATIENT
Start: 2021-01-01 | End: 2021-01-01

## 2021-01-01 RX ORDER — DEXTROSE 50 % IN WATER (D50W) INTRAVENOUS SYRINGE
25-50 AS NEEDED
Status: DISCONTINUED | OUTPATIENT
Start: 2021-01-01 | End: 2021-01-01 | Stop reason: HOSPADM

## 2021-01-01 RX ORDER — DEXAMETHASONE SODIUM PHOSPHATE 4 MG/ML
4 INJECTION, SOLUTION INTRA-ARTICULAR; INTRALESIONAL; INTRAMUSCULAR; INTRAVENOUS; SOFT TISSUE EVERY 6 HOURS
Status: DISCONTINUED | OUTPATIENT
Start: 2021-01-01 | End: 2021-01-01

## 2021-01-01 RX ORDER — LORAZEPAM 2 MG/ML
1 INJECTION INTRAMUSCULAR
Status: DISCONTINUED | OUTPATIENT
Start: 2021-01-01 | End: 2021-01-01 | Stop reason: HOSPADM

## 2021-01-01 RX ORDER — OMEPRAZOLE 40 MG/1
40 CAPSULE, DELAYED RELEASE ORAL DAILY
Status: CANCELLED | OUTPATIENT
Start: 2021-01-01

## 2021-01-01 RX ORDER — LANCETS
EACH MISCELLANEOUS
Qty: 1 EACH | Refills: 0 | Status: SHIPPED | OUTPATIENT
Start: 2021-01-01 | End: 2021-01-01

## 2021-01-01 RX ORDER — SODIUM CHLORIDE 0.9 % (FLUSH) 0.9 %
5-40 SYRINGE (ML) INJECTION EVERY 8 HOURS
Status: DISCONTINUED | OUTPATIENT
Start: 2021-01-01 | End: 2021-01-01 | Stop reason: HOSPADM

## 2021-01-01 RX ORDER — ACETAMINOPHEN 650 MG/1
650 SUPPOSITORY RECTAL
Status: DISCONTINUED | OUTPATIENT
Start: 2021-01-01 | End: 2021-01-01 | Stop reason: HOSPADM

## 2021-01-01 RX ORDER — INSULIN GLARGINE 100 [IU]/ML
13 INJECTION, SOLUTION SUBCUTANEOUS
Status: DISCONTINUED | OUTPATIENT
Start: 2021-01-01 | End: 2021-01-01

## 2021-01-01 RX ORDER — POLYETHYLENE GLYCOL 3350 17 G/17G
17 POWDER, FOR SOLUTION ORAL DAILY PRN
Status: DISCONTINUED | OUTPATIENT
Start: 2021-01-01 | End: 2021-01-01 | Stop reason: HOSPADM

## 2021-01-01 RX ORDER — HYDRALAZINE HYDROCHLORIDE 25 MG/1
50 TABLET, FILM COATED ORAL 2 TIMES DAILY
Status: CANCELLED | OUTPATIENT
Start: 2021-01-01

## 2021-01-01 RX ORDER — CLOPIDOGREL BISULFATE 75 MG/1
75 TABLET ORAL DAILY
Qty: 30 TAB | Refills: 0 | Status: SHIPPED | OUTPATIENT
Start: 2021-01-01

## 2021-01-01 RX ORDER — DEXAMETHASONE 2 MG/1
TABLET ORAL
Qty: 12 TAB | Refills: 0 | Status: SHIPPED | OUTPATIENT
Start: 2021-01-01

## 2021-01-01 RX ORDER — INSULIN GLARGINE 100 [IU]/ML
30 INJECTION, SOLUTION SUBCUTANEOUS
Status: DISCONTINUED | OUTPATIENT
Start: 2021-01-01 | End: 2021-01-01

## 2021-01-01 RX ORDER — MAGNESIUM SULFATE 1 G/100ML
1 INJECTION INTRAVENOUS ONCE
Status: COMPLETED | OUTPATIENT
Start: 2021-01-01 | End: 2021-01-01

## 2021-01-01 RX ORDER — MORPHINE SULFATE ORAL SOLUTION 10 MG/5ML
5 SOLUTION ORAL
Status: DISCONTINUED | OUTPATIENT
Start: 2021-01-01 | End: 2021-01-01 | Stop reason: HOSPADM

## 2021-01-01 RX ORDER — DEXTROSE 50 % IN WATER (D50W) INTRAVENOUS SYRINGE
25-50 AS NEEDED
Status: DISCONTINUED | OUTPATIENT
Start: 2021-01-01 | End: 2021-01-01 | Stop reason: SDUPTHER

## 2021-01-01 RX ORDER — MAGNESIUM SULFATE 100 %
4 CRYSTALS MISCELLANEOUS AS NEEDED
Status: DISCONTINUED | OUTPATIENT
Start: 2021-01-01 | End: 2021-01-01 | Stop reason: SDUPTHER

## 2021-01-01 RX ORDER — INSULIN LISPRO 100 [IU]/ML
INJECTION, SOLUTION INTRAVENOUS; SUBCUTANEOUS
Status: DISCONTINUED | OUTPATIENT
Start: 2021-01-01 | End: 2021-01-01

## 2021-01-01 RX ORDER — LORATADINE 10 MG/1
10 TABLET ORAL DAILY
Status: DISCONTINUED | OUTPATIENT
Start: 2021-01-01 | End: 2021-01-01 | Stop reason: HOSPADM

## 2021-01-01 RX ORDER — DEXAMETHASONE SODIUM PHOSPHATE 10 MG/ML
4 INJECTION INTRAMUSCULAR; INTRAVENOUS EVERY 6 HOURS
Status: DISCONTINUED | OUTPATIENT
Start: 2021-01-01 | End: 2021-01-01

## 2021-01-01 RX ORDER — ATORVASTATIN CALCIUM 40 MG/1
40 TABLET, FILM COATED ORAL
Qty: 30 TAB | Refills: 0 | Status: SHIPPED | OUTPATIENT
Start: 2021-01-01

## 2021-01-01 RX ORDER — ALBUTEROL SULFATE 90 UG/1
2 AEROSOL, METERED RESPIRATORY (INHALATION)
Status: CANCELLED | OUTPATIENT
Start: 2021-01-01

## 2021-01-01 RX ORDER — HEPARIN SODIUM 5000 [USP'U]/ML
5000 INJECTION, SOLUTION INTRAVENOUS; SUBCUTANEOUS EVERY 12 HOURS
Status: DISCONTINUED | OUTPATIENT
Start: 2021-01-01 | End: 2021-01-01

## 2021-01-01 RX ORDER — DEXAMETHASONE 4 MG/1
4 TABLET ORAL DAILY
Status: DISCONTINUED | OUTPATIENT
Start: 2021-01-01 | End: 2021-01-01 | Stop reason: HOSPADM

## 2021-01-01 RX ORDER — AMLODIPINE BESYLATE 5 MG/1
10 TABLET ORAL DAILY
Status: CANCELLED | OUTPATIENT
Start: 2021-01-01

## 2021-01-01 RX ORDER — IBUPROFEN 200 MG
CAPSULE ORAL
Qty: 100 STRIP | Refills: 0 | Status: SHIPPED | OUTPATIENT
Start: 2021-01-01 | End: 2021-01-01

## 2021-01-01 RX ORDER — CLOPIDOGREL BISULFATE 75 MG/1
75 TABLET ORAL DAILY
Status: CANCELLED | OUTPATIENT
Start: 2021-01-01

## 2021-01-01 RX ORDER — LORATADINE 10 MG/1
10 TABLET ORAL DAILY
Status: CANCELLED | OUTPATIENT
Start: 2021-01-01

## 2021-01-01 RX ORDER — FUROSEMIDE 40 MG/1
40 TABLET ORAL DAILY
Status: CANCELLED | OUTPATIENT
Start: 2021-01-01

## 2021-01-01 RX ORDER — FUROSEMIDE 10 MG/ML
60 INJECTION INTRAMUSCULAR; INTRAVENOUS DAILY
Status: DISCONTINUED | OUTPATIENT
Start: 2021-01-01 | End: 2021-01-01

## 2021-01-01 RX ORDER — METOPROLOL TARTRATE 25 MG/1
25 TABLET, FILM COATED ORAL EVERY 12 HOURS
Status: DISCONTINUED | OUTPATIENT
Start: 2021-01-01 | End: 2021-01-01

## 2021-01-01 RX ORDER — MORPHINE SULFATE 2 MG/ML
2 INJECTION, SOLUTION INTRAMUSCULAR; INTRAVENOUS
Status: DISPENSED | OUTPATIENT
Start: 2021-01-01 | End: 2021-01-01

## 2021-01-01 RX ORDER — DEXAMETHASONE 4 MG/1
4 TABLET ORAL EVERY 12 HOURS
Status: DISCONTINUED | OUTPATIENT
Start: 2021-01-01 | End: 2021-01-01

## 2021-01-01 RX ORDER — MAGNESIUM SULFATE 100 %
4 CRYSTALS MISCELLANEOUS AS NEEDED
Status: DISCONTINUED | OUTPATIENT
Start: 2021-01-01 | End: 2021-01-01 | Stop reason: HOSPADM

## 2021-01-01 RX ORDER — ATORVASTATIN CALCIUM 40 MG/1
40 TABLET, FILM COATED ORAL
Status: DISCONTINUED | OUTPATIENT
Start: 2021-01-01 | End: 2021-01-01 | Stop reason: HOSPADM

## 2021-01-01 RX ORDER — ACETAMINOPHEN 325 MG/1
650 TABLET ORAL
Status: DISCONTINUED | OUTPATIENT
Start: 2021-01-01 | End: 2021-01-01 | Stop reason: HOSPADM

## 2021-01-01 RX ORDER — DEXAMETHASONE 4 MG/1
6 TABLET ORAL EVERY 12 HOURS
Status: DISCONTINUED | OUTPATIENT
Start: 2021-01-01 | End: 2021-01-01

## 2021-01-01 RX ORDER — ENOXAPARIN SODIUM 100 MG/ML
40 INJECTION SUBCUTANEOUS DAILY
Status: DISCONTINUED | OUTPATIENT
Start: 2021-01-01 | End: 2021-01-01 | Stop reason: HOSPADM

## 2021-01-01 RX ORDER — ATENOLOL 50 MG/1
50 TABLET ORAL DAILY
Status: DISCONTINUED | OUTPATIENT
Start: 2021-01-01 | End: 2021-01-01 | Stop reason: HOSPADM

## 2021-01-01 RX ORDER — AMLODIPINE BESYLATE 5 MG/1
5 TABLET ORAL DAILY
Qty: 30 TAB | Refills: 0 | Status: SHIPPED | OUTPATIENT
Start: 2021-01-01

## 2021-01-01 RX ORDER — PROMETHAZINE HYDROCHLORIDE 25 MG/1
12.5 TABLET ORAL
Status: DISCONTINUED | OUTPATIENT
Start: 2021-01-01 | End: 2021-01-01 | Stop reason: HOSPADM

## 2021-01-01 RX ORDER — IVERMECTIN 3 MG/1
12 TABLET ORAL ONCE
Status: COMPLETED | OUTPATIENT
Start: 2021-01-01 | End: 2021-01-01

## 2021-01-01 RX ORDER — ATORVASTATIN CALCIUM 40 MG/1
40 TABLET, FILM COATED ORAL
Status: CANCELLED | OUTPATIENT
Start: 2021-01-01

## 2021-01-01 RX ORDER — BUDESONIDE AND FORMOTEROL FUMARATE DIHYDRATE 160; 4.5 UG/1; UG/1
2 AEROSOL RESPIRATORY (INHALATION)
Status: DISCONTINUED | OUTPATIENT
Start: 2021-01-01 | End: 2021-01-01 | Stop reason: HOSPADM

## 2021-01-01 RX ORDER — AZITHROMYCIN 500 MG/1
500 TABLET, FILM COATED ORAL DAILY
Status: DISCONTINUED | OUTPATIENT
Start: 2021-01-01 | End: 2021-01-01 | Stop reason: HOSPADM

## 2021-01-01 RX ORDER — GLIMEPIRIDE 1 MG/1
2 TABLET ORAL
Status: DISCONTINUED | OUTPATIENT
Start: 2021-01-01 | End: 2021-01-01

## 2021-01-01 RX ORDER — HYDROXYZINE PAMOATE 25 MG/1
50 CAPSULE ORAL
Status: DISCONTINUED | OUTPATIENT
Start: 2021-01-01 | End: 2021-01-01 | Stop reason: HOSPADM

## 2021-01-01 RX ORDER — DEXAMETHASONE SODIUM PHOSPHATE 10 MG/ML
10 INJECTION INTRAMUSCULAR; INTRAVENOUS ONCE
Status: COMPLETED | OUTPATIENT
Start: 2021-01-01 | End: 2021-01-01

## 2021-01-01 RX ORDER — GLYCOPYRROLATE 0.2 MG/ML
0.2 INJECTION INTRAMUSCULAR; INTRAVENOUS
Status: DISCONTINUED | OUTPATIENT
Start: 2021-01-01 | End: 2021-01-01 | Stop reason: HOSPADM

## 2021-01-01 RX ORDER — METOPROLOL TARTRATE 5 MG/5ML
5 INJECTION INTRAVENOUS
Status: DISCONTINUED | OUTPATIENT
Start: 2021-01-01 | End: 2021-01-01

## 2021-01-01 RX ORDER — AMLODIPINE BESYLATE 5 MG/1
5 TABLET ORAL DAILY
Status: CANCELLED | OUTPATIENT
Start: 2021-01-01

## 2021-01-01 RX ORDER — BUDESONIDE AND FORMOTEROL FUMARATE DIHYDRATE 160; 4.5 UG/1; UG/1
2 AEROSOL RESPIRATORY (INHALATION)
Status: DISCONTINUED | OUTPATIENT
Start: 2021-01-01 | End: 2021-01-01

## 2021-01-01 RX ORDER — ATENOLOL 50 MG/1
50 TABLET ORAL DAILY
Status: CANCELLED | OUTPATIENT
Start: 2021-01-01

## 2021-01-01 RX ORDER — FUROSEMIDE 40 MG/1
40 TABLET ORAL DAILY
Status: DISCONTINUED | OUTPATIENT
Start: 2021-01-01 | End: 2021-01-01 | Stop reason: HOSPADM

## 2021-01-01 RX ORDER — BUDESONIDE AND FORMOTEROL FUMARATE DIHYDRATE 160; 4.5 UG/1; UG/1
2 AEROSOL RESPIRATORY (INHALATION) EVERY 12 HOURS
Status: CANCELLED | OUTPATIENT
Start: 2021-01-01

## 2021-01-01 RX ORDER — DEXAMETHASONE SODIUM PHOSPHATE 100 MG/10ML
6 INJECTION INTRAMUSCULAR; INTRAVENOUS EVERY 24 HOURS
Status: DISCONTINUED | OUTPATIENT
Start: 2021-01-01 | End: 2021-01-01

## 2021-01-01 RX ORDER — ALBUTEROL SULFATE 90 UG/1
1 AEROSOL, METERED RESPIRATORY (INHALATION)
Status: DISCONTINUED | OUTPATIENT
Start: 2021-01-01 | End: 2021-01-01

## 2021-01-01 RX ORDER — FUROSEMIDE 10 MG/ML
60 INJECTION INTRAMUSCULAR; INTRAVENOUS EVERY 6 HOURS
Status: COMPLETED | OUTPATIENT
Start: 2021-01-01 | End: 2021-01-01

## 2021-01-01 RX ORDER — DEXAMETHASONE SODIUM PHOSPHATE 10 MG/ML
10 INJECTION INTRAMUSCULAR; INTRAVENOUS EVERY 12 HOURS
Status: DISCONTINUED | OUTPATIENT
Start: 2021-01-01 | End: 2021-01-01

## 2021-01-01 RX ORDER — INSULIN LISPRO 100 [IU]/ML
INJECTION, SOLUTION INTRAVENOUS; SUBCUTANEOUS
Status: DISCONTINUED | OUTPATIENT
Start: 2021-01-01 | End: 2021-01-01 | Stop reason: HOSPADM

## 2021-01-01 RX ORDER — METOPROLOL TARTRATE 5 MG/5ML
5 INJECTION INTRAVENOUS ONCE
Status: COMPLETED | OUTPATIENT
Start: 2021-01-01 | End: 2021-01-01

## 2021-01-01 RX ORDER — ENOXAPARIN SODIUM 100 MG/ML
40 INJECTION SUBCUTANEOUS EVERY 12 HOURS
Status: DISCONTINUED | OUTPATIENT
Start: 2021-01-01 | End: 2021-01-01

## 2021-01-01 RX ORDER — FUROSEMIDE 10 MG/ML
60 INJECTION INTRAMUSCULAR; INTRAVENOUS ONCE
Status: COMPLETED | OUTPATIENT
Start: 2021-01-01 | End: 2021-01-01

## 2021-01-01 RX ORDER — ONDANSETRON 2 MG/ML
4 INJECTION INTRAMUSCULAR; INTRAVENOUS
Status: DISCONTINUED | OUTPATIENT
Start: 2021-01-01 | End: 2021-01-01 | Stop reason: HOSPADM

## 2021-01-01 RX ORDER — INSULIN LISPRO 100 [IU]/ML
INJECTION, SOLUTION INTRAVENOUS; SUBCUTANEOUS
Qty: 1 VIAL | Refills: 0 | Status: SHIPPED
Start: 2021-01-01

## 2021-01-01 RX ORDER — AMLODIPINE BESYLATE 5 MG/1
5 TABLET ORAL DAILY
Status: DISCONTINUED | OUTPATIENT
Start: 2021-01-01 | End: 2021-01-01 | Stop reason: HOSPADM

## 2021-01-01 RX ORDER — HYDRALAZINE HYDROCHLORIDE 25 MG/1
25 TABLET, FILM COATED ORAL 2 TIMES DAILY
Status: DISCONTINUED | OUTPATIENT
Start: 2021-01-01 | End: 2021-01-01 | Stop reason: HOSPADM

## 2021-01-01 RX ORDER — SCOLOPAMINE TRANSDERMAL SYSTEM 1 MG/1
1 PATCH, EXTENDED RELEASE TRANSDERMAL
Status: DISCONTINUED | OUTPATIENT
Start: 2021-01-01 | End: 2021-01-01 | Stop reason: HOSPADM

## 2021-01-01 RX ORDER — LORAZEPAM 2 MG/ML
1 CONCENTRATE ORAL
Status: DISCONTINUED | OUTPATIENT
Start: 2021-01-01 | End: 2021-01-01 | Stop reason: HOSPADM

## 2021-01-01 RX ORDER — ALBUTEROL SULFATE 90 UG/1
2 AEROSOL, METERED RESPIRATORY (INHALATION)
Status: DISCONTINUED | OUTPATIENT
Start: 2021-01-01 | End: 2021-01-01

## 2021-01-01 RX ORDER — LIDOCAINE 40 MG/G
CREAM TOPICAL 2 TIMES DAILY
Status: DISCONTINUED | OUTPATIENT
Start: 2021-01-01 | End: 2021-01-01

## 2021-01-01 RX ORDER — BUDESONIDE AND FORMOTEROL FUMARATE DIHYDRATE 160; 4.5 UG/1; UG/1
2 AEROSOL RESPIRATORY (INHALATION) EVERY 12 HOURS
Qty: 3 INHALER | Refills: 0 | Status: SHIPPED | OUTPATIENT
Start: 2021-01-01

## 2021-01-01 RX ORDER — GLIPIZIDE 5 MG/1
10 TABLET ORAL 2 TIMES DAILY
Status: DISCONTINUED | OUTPATIENT
Start: 2021-01-01 | End: 2021-01-01 | Stop reason: HOSPADM

## 2021-01-01 RX ORDER — MORPHINE SULFATE 2 MG/ML
2 INJECTION, SOLUTION INTRAMUSCULAR; INTRAVENOUS
Status: DISCONTINUED | OUTPATIENT
Start: 2021-01-01 | End: 2021-01-01 | Stop reason: HOSPADM

## 2021-01-01 RX ORDER — HYDRALAZINE HYDROCHLORIDE 50 MG/1
50 TABLET, FILM COATED ORAL 2 TIMES DAILY
Status: DISCONTINUED | OUTPATIENT
Start: 2021-01-01 | End: 2021-01-01 | Stop reason: HOSPADM

## 2021-01-01 RX ORDER — CLOPIDOGREL BISULFATE 75 MG/1
75 TABLET ORAL DAILY
Status: DISCONTINUED | OUTPATIENT
Start: 2021-01-01 | End: 2021-01-01 | Stop reason: HOSPADM

## 2021-01-01 RX ORDER — DILTIAZEM HYDROCHLORIDE 30 MG/1
30 TABLET, FILM COATED ORAL EVERY 6 HOURS
Status: DISCONTINUED | OUTPATIENT
Start: 2021-01-01 | End: 2021-01-01

## 2021-01-01 RX ORDER — INSULIN PUMP SYRINGE, 3 ML
EACH MISCELLANEOUS
Qty: 1 KIT | Refills: 0 | Status: SHIPPED | OUTPATIENT
Start: 2021-01-01 | End: 2021-01-01

## 2021-01-01 RX ADMIN — GLIMEPIRIDE 2 MG: 1 TABLET ORAL at 09:45

## 2021-01-01 RX ADMIN — DEXAMETHASONE SODIUM PHOSPHATE 4 MG: 4 INJECTION, SOLUTION INTRAMUSCULAR; INTRAVENOUS at 18:04

## 2021-01-01 RX ADMIN — DEXAMETHASONE 4 MG: 4 TABLET ORAL at 21:20

## 2021-01-01 RX ADMIN — DEXAMETHASONE SODIUM PHOSPHATE 4 MG: 4 INJECTION, SOLUTION INTRA-ARTICULAR; INTRALESIONAL; INTRAMUSCULAR; INTRAVENOUS; SOFT TISSUE at 05:21

## 2021-01-01 RX ADMIN — DEXAMETHASONE SODIUM PHOSPHATE 4 MG: 4 INJECTION, SOLUTION INTRA-ARTICULAR; INTRALESIONAL; INTRAMUSCULAR; INTRAVENOUS; SOFT TISSUE at 09:56

## 2021-01-01 RX ADMIN — DEXAMETHASONE SODIUM PHOSPHATE 4 MG: 4 INJECTION, SOLUTION INTRA-ARTICULAR; INTRALESIONAL; INTRAMUSCULAR; INTRAVENOUS; SOFT TISSUE at 18:21

## 2021-01-01 RX ADMIN — TOCILIZUMAB 400 MG: 20 INJECTION, SOLUTION, CONCENTRATE INTRAVENOUS at 17:24

## 2021-01-01 RX ADMIN — GLIPIZIDE 10 MG: 5 TABLET ORAL at 01:21

## 2021-01-01 RX ADMIN — FAMOTIDINE 20 MG: 10 INJECTION, SOLUTION INTRAVENOUS at 01:21

## 2021-01-01 RX ADMIN — INSULIN LISPRO 3 UNITS: 100 INJECTION, SOLUTION INTRAVENOUS; SUBCUTANEOUS at 12:34

## 2021-01-01 RX ADMIN — AZITHROMYCIN MONOHYDRATE 500 MG: 500 INJECTION, POWDER, LYOPHILIZED, FOR SOLUTION INTRAVENOUS at 09:06

## 2021-01-01 RX ADMIN — DEXAMETHASONE SODIUM PHOSPHATE 10 MG: 10 INJECTION INTRAMUSCULAR; INTRAVENOUS at 16:14

## 2021-01-01 RX ADMIN — FUROSEMIDE 60 MG: 10 INJECTION, SOLUTION INTRAMUSCULAR; INTRAVENOUS at 22:45

## 2021-01-01 RX ADMIN — Medication 10 ML: at 14:33

## 2021-01-01 RX ADMIN — BUDESONIDE AND FORMOTEROL FUMARATE DIHYDRATE 2 PUFF: 160; 4.5 AEROSOL RESPIRATORY (INHALATION) at 08:05

## 2021-01-01 RX ADMIN — TOCILIZUMAB 400 MG: 20 INJECTION, SOLUTION, CONCENTRATE INTRAVENOUS at 18:38

## 2021-01-01 RX ADMIN — HEPARIN SODIUM 5000 UNITS: 5000 INJECTION INTRAVENOUS; SUBCUTANEOUS at 18:19

## 2021-01-01 RX ADMIN — Medication 10 ML: at 05:47

## 2021-01-01 RX ADMIN — INSULIN LISPRO 6 UNITS: 100 INJECTION, SOLUTION INTRAVENOUS; SUBCUTANEOUS at 21:39

## 2021-01-01 RX ADMIN — Medication 5 ML: at 05:23

## 2021-01-01 RX ADMIN — DILTIAZEM HYDROCHLORIDE 30 MG: 30 TABLET, FILM COATED ORAL at 03:52

## 2021-01-01 RX ADMIN — BUDESONIDE AND FORMOTEROL FUMARATE DIHYDRATE 2 PUFF: 160; 4.5 AEROSOL RESPIRATORY (INHALATION) at 20:38

## 2021-01-01 RX ADMIN — Medication 10 ML: at 19:46

## 2021-01-01 RX ADMIN — INSULIN GLARGINE 13 UNITS: 100 INJECTION, SOLUTION SUBCUTANEOUS at 20:20

## 2021-01-01 RX ADMIN — ALBUTEROL SULFATE 2 PUFF: 108 AEROSOL, METERED RESPIRATORY (INHALATION) at 01:07

## 2021-01-01 RX ADMIN — LOSARTAN POTASSIUM 50 MG: 50 TABLET, FILM COATED ORAL at 08:56

## 2021-01-01 RX ADMIN — INSULIN LISPRO 3 UNITS: 100 INJECTION, SOLUTION INTRAVENOUS; SUBCUTANEOUS at 11:30

## 2021-01-01 RX ADMIN — FUROSEMIDE 40 MG: 40 TABLET ORAL at 08:56

## 2021-01-01 RX ADMIN — HEPARIN SODIUM 5000 UNITS: 5000 INJECTION INTRAVENOUS; SUBCUTANEOUS at 04:37

## 2021-01-01 RX ADMIN — INSULIN LISPRO 3 UNITS: 100 INJECTION, SOLUTION INTRAVENOUS; SUBCUTANEOUS at 17:23

## 2021-01-01 RX ADMIN — CEFTRIAXONE 1 G: 1 INJECTION, POWDER, FOR SOLUTION INTRAMUSCULAR; INTRAVENOUS at 11:45

## 2021-01-01 RX ADMIN — Medication 10 ML: at 14:46

## 2021-01-01 RX ADMIN — HYDROXYZINE PAMOATE 50 MG: 25 CAPSULE ORAL at 19:38

## 2021-01-01 RX ADMIN — CHOLECALCIFEROL TAB 25 MCG (1000 UNIT) 2000 UNITS: 25 TAB at 08:37

## 2021-01-01 RX ADMIN — ALBUTEROL SULFATE 2 PUFF: 108 AEROSOL, METERED RESPIRATORY (INHALATION) at 21:17

## 2021-01-01 RX ADMIN — INSULIN LISPRO 6 UNITS: 100 INJECTION, SOLUTION INTRAVENOUS; SUBCUTANEOUS at 11:30

## 2021-01-01 RX ADMIN — INSULIN LISPRO 4 UNITS: 100 INJECTION, SOLUTION INTRAVENOUS; SUBCUTANEOUS at 21:14

## 2021-01-01 RX ADMIN — BUDESONIDE AND FORMOTEROL FUMARATE DIHYDRATE 2 PUFF: 160; 4.5 AEROSOL RESPIRATORY (INHALATION) at 19:56

## 2021-01-01 RX ADMIN — VANCOMYCIN HYDROCHLORIDE 1000 MG: 1 INJECTION, POWDER, LYOPHILIZED, FOR SOLUTION INTRAVENOUS at 18:04

## 2021-01-01 RX ADMIN — ALBUTEROL SULFATE 2 PUFF: 108 AEROSOL, METERED RESPIRATORY (INHALATION) at 20:48

## 2021-01-01 RX ADMIN — LIDOCAINE 4%: 4 CREAM TOPICAL at 11:47

## 2021-01-01 RX ADMIN — INSULIN LISPRO 15 UNITS: 100 INJECTION, SOLUTION INTRAVENOUS; SUBCUTANEOUS at 12:42

## 2021-01-01 RX ADMIN — INSULIN LISPRO 7 UNITS: 100 INJECTION, SOLUTION INTRAVENOUS; SUBCUTANEOUS at 17:41

## 2021-01-01 RX ADMIN — AMLODIPINE BESYLATE 5 MG: 5 TABLET ORAL at 10:55

## 2021-01-01 RX ADMIN — HYDRALAZINE HYDROCHLORIDE 25 MG: 50 TABLET, FILM COATED ORAL at 01:21

## 2021-01-01 RX ADMIN — FAMOTIDINE 20 MG: 10 INJECTION, SOLUTION INTRAVENOUS at 09:09

## 2021-01-01 RX ADMIN — HYDROXYZINE PAMOATE 50 MG: 25 CAPSULE ORAL at 21:20

## 2021-01-01 RX ADMIN — DEXAMETHASONE SODIUM PHOSPHATE 4 MG: 4 INJECTION, SOLUTION INTRA-ARTICULAR; INTRALESIONAL; INTRAMUSCULAR; INTRAVENOUS; SOFT TISSUE at 23:06

## 2021-01-01 RX ADMIN — LORATADINE 10 MG: 10 TABLET ORAL at 08:57

## 2021-01-01 RX ADMIN — Medication 10 ML: at 15:44

## 2021-01-01 RX ADMIN — Medication 10 ML: at 21:16

## 2021-01-01 RX ADMIN — BUDESONIDE AND FORMOTEROL FUMARATE DIHYDRATE 2 PUFF: 160; 4.5 AEROSOL RESPIRATORY (INHALATION) at 12:01

## 2021-01-01 RX ADMIN — GLIPIZIDE 10 MG: 5 TABLET ORAL at 22:50

## 2021-01-01 RX ADMIN — MORPHINE SULFATE 2 MG: 2 INJECTION, SOLUTION INTRAMUSCULAR; INTRAVENOUS at 09:05

## 2021-01-01 RX ADMIN — HYDROXYZINE PAMOATE 50 MG: 25 CAPSULE ORAL at 10:05

## 2021-01-01 RX ADMIN — ALBUTEROL SULFATE 2 PUFF: 108 AEROSOL, METERED RESPIRATORY (INHALATION) at 02:05

## 2021-01-01 RX ADMIN — LIDOCAINE 4%: 4 CREAM TOPICAL at 10:49

## 2021-01-01 RX ADMIN — DEXAMETHASONE SODIUM PHOSPHATE 4 MG: 4 INJECTION, SOLUTION INTRA-ARTICULAR; INTRALESIONAL; INTRAMUSCULAR; INTRAVENOUS; SOFT TISSUE at 17:30

## 2021-01-01 RX ADMIN — KETOROLAC TROMETHAMINE 10 MG: 10 TABLET, FILM COATED ORAL at 05:13

## 2021-01-01 RX ADMIN — DEXAMETHASONE SODIUM PHOSPHATE 4 MG: 4 INJECTION, SOLUTION INTRA-ARTICULAR; INTRALESIONAL; INTRAMUSCULAR; INTRAVENOUS; SOFT TISSUE at 04:38

## 2021-01-01 RX ADMIN — HEPARIN SODIUM 5000 UNITS: 5000 INJECTION INTRAVENOUS; SUBCUTANEOUS at 05:41

## 2021-01-01 RX ADMIN — LIDOCAINE 4%: 4 CREAM TOPICAL at 09:46

## 2021-01-01 RX ADMIN — ENOXAPARIN SODIUM 40 MG: 40 INJECTION SUBCUTANEOUS at 10:51

## 2021-01-01 RX ADMIN — ALBUTEROL SULFATE 2 PUFF: 108 AEROSOL, METERED RESPIRATORY (INHALATION) at 15:39

## 2021-01-01 RX ADMIN — HYDROXYZINE PAMOATE 50 MG: 25 CAPSULE ORAL at 15:08

## 2021-01-01 RX ADMIN — BUDESONIDE AND FORMOTEROL FUMARATE DIHYDRATE 2 PUFF: 160; 4.5 AEROSOL RESPIRATORY (INHALATION) at 20:51

## 2021-01-01 RX ADMIN — INSULIN LISPRO 6 UNITS: 100 INJECTION, SOLUTION INTRAVENOUS; SUBCUTANEOUS at 10:05

## 2021-01-01 RX ADMIN — Medication 10 ML: at 20:11

## 2021-01-01 RX ADMIN — SODIUM CHLORIDE 100 MG: 9 INJECTION, SOLUTION INTRAVENOUS at 11:21

## 2021-01-01 RX ADMIN — INSULIN LISPRO 8 UNITS: 100 INJECTION, SOLUTION INTRAVENOUS; SUBCUTANEOUS at 12:24

## 2021-01-01 RX ADMIN — ALBUTEROL SULFATE 2 PUFF: 108 AEROSOL, METERED RESPIRATORY (INHALATION) at 09:48

## 2021-01-01 RX ADMIN — LOSARTAN POTASSIUM 50 MG: 50 TABLET, FILM COATED ORAL at 10:54

## 2021-01-01 RX ADMIN — ALBUTEROL SULFATE 2 PUFF: 108 AEROSOL, METERED RESPIRATORY (INHALATION) at 21:54

## 2021-01-01 RX ADMIN — ALBUTEROL SULFATE 2 PUFF: 108 AEROSOL, METERED RESPIRATORY (INHALATION) at 09:13

## 2021-01-01 RX ADMIN — GLIMEPIRIDE 2 MG: 1 TABLET ORAL at 14:13

## 2021-01-01 RX ADMIN — MORPHINE SULFATE 2 MG: 2 INJECTION, SOLUTION INTRAMUSCULAR; INTRAVENOUS at 15:08

## 2021-01-01 RX ADMIN — ALBUTEROL SULFATE 2 PUFF: 108 AEROSOL, METERED RESPIRATORY (INHALATION) at 02:00

## 2021-01-01 RX ADMIN — HEPARIN SODIUM 5000 UNITS: 5000 INJECTION INTRAVENOUS; SUBCUTANEOUS at 18:15

## 2021-01-01 RX ADMIN — ENOXAPARIN SODIUM 40 MG: 40 INJECTION SUBCUTANEOUS at 08:40

## 2021-01-01 RX ADMIN — DEXAMETHASONE SODIUM PHOSPHATE 4 MG: 4 INJECTION, SOLUTION INTRA-ARTICULAR; INTRALESIONAL; INTRAMUSCULAR; INTRAVENOUS; SOFT TISSUE at 23:15

## 2021-01-01 RX ADMIN — ALBUTEROL SULFATE 2 PUFF: 108 AEROSOL, METERED RESPIRATORY (INHALATION) at 13:24

## 2021-01-01 RX ADMIN — DILTIAZEM HYDROCHLORIDE 30 MG: 30 TABLET, FILM COATED ORAL at 21:39

## 2021-01-01 RX ADMIN — ACETAMINOPHEN 650 MG: 325 TABLET, FILM COATED ORAL at 13:26

## 2021-01-01 RX ADMIN — SODIUM CHLORIDE 1000 ML: 9 INJECTION, SOLUTION INTRAVENOUS at 11:45

## 2021-01-01 RX ADMIN — IVERMECTIN 12 MG: 3 TABLET ORAL at 17:44

## 2021-01-01 RX ADMIN — Medication 10 ML: at 05:19

## 2021-01-01 RX ADMIN — ALBUTEROL SULFATE 2 PUFF: 108 AEROSOL, METERED RESPIRATORY (INHALATION) at 01:29

## 2021-01-01 RX ADMIN — ALBUTEROL SULFATE 2 PUFF: 108 AEROSOL, METERED RESPIRATORY (INHALATION) at 20:51

## 2021-01-01 RX ADMIN — LORATADINE 10 MG: 10 TABLET ORAL at 10:55

## 2021-01-01 RX ADMIN — BUDESONIDE AND FORMOTEROL FUMARATE DIHYDRATE 2 PUFF: 160; 4.5 AEROSOL RESPIRATORY (INHALATION) at 21:00

## 2021-01-01 RX ADMIN — Medication 10 ML: at 05:42

## 2021-01-01 RX ADMIN — MORPHINE SULFATE 2 MG: 2 INJECTION, SOLUTION INTRAMUSCULAR; INTRAVENOUS at 10:48

## 2021-01-01 RX ADMIN — HYDROXYZINE PAMOATE 50 MG: 25 CAPSULE ORAL at 17:13

## 2021-01-01 RX ADMIN — INSULIN LISPRO 4 UNITS: 100 INJECTION, SOLUTION INTRAVENOUS; SUBCUTANEOUS at 16:30

## 2021-01-01 RX ADMIN — INSULIN LISPRO 4 UNITS: 100 INJECTION, SOLUTION INTRAVENOUS; SUBCUTANEOUS at 16:38

## 2021-01-01 RX ADMIN — INSULIN LISPRO 2 UNITS: 100 INJECTION, SOLUTION INTRAVENOUS; SUBCUTANEOUS at 16:30

## 2021-01-01 RX ADMIN — INSULIN LISPRO 4 UNITS: 100 INJECTION, SOLUTION INTRAVENOUS; SUBCUTANEOUS at 09:55

## 2021-01-01 RX ADMIN — DEXAMETHASONE SODIUM PHOSPHATE 10 MG: 10 INJECTION, SOLUTION INTRAMUSCULAR; INTRAVENOUS at 09:51

## 2021-01-01 RX ADMIN — INSULIN LISPRO 5 UNITS: 100 INJECTION, SOLUTION INTRAVENOUS; SUBCUTANEOUS at 08:58

## 2021-01-01 RX ADMIN — FAMOTIDINE 20 MG: 10 INJECTION, SOLUTION INTRAVENOUS at 08:57

## 2021-01-01 RX ADMIN — INSULIN LISPRO 7 UNITS: 100 INJECTION, SOLUTION INTRAVENOUS; SUBCUTANEOUS at 14:24

## 2021-01-01 RX ADMIN — ALBUTEROL SULFATE 2 PUFF: 108 AEROSOL, METERED RESPIRATORY (INHALATION) at 08:42

## 2021-01-01 RX ADMIN — HYDRALAZINE HYDROCHLORIDE 50 MG: 50 TABLET, FILM COATED ORAL at 01:24

## 2021-01-01 RX ADMIN — MORPHINE SULFATE 2 MG: 2 INJECTION, SOLUTION INTRAMUSCULAR; INTRAVENOUS at 14:26

## 2021-01-01 RX ADMIN — DEXAMETHASONE 4 MG: 4 TABLET ORAL at 16:57

## 2021-01-01 RX ADMIN — ENOXAPARIN SODIUM 40 MG: 40 INJECTION SUBCUTANEOUS at 08:57

## 2021-01-01 RX ADMIN — BUDESONIDE AND FORMOTEROL FUMARATE DIHYDRATE 2 PUFF: 160; 4.5 AEROSOL RESPIRATORY (INHALATION) at 09:13

## 2021-01-01 RX ADMIN — MORPHINE SULFATE 2 MG: 2 INJECTION, SOLUTION INTRAMUSCULAR; INTRAVENOUS at 04:38

## 2021-01-01 RX ADMIN — INSULIN GLARGINE 13 UNITS: 100 INJECTION, SOLUTION SUBCUTANEOUS at 22:00

## 2021-01-01 RX ADMIN — BUDESONIDE AND FORMOTEROL FUMARATE DIHYDRATE 2 PUFF: 160; 4.5 AEROSOL RESPIRATORY (INHALATION) at 21:17

## 2021-01-01 RX ADMIN — Medication 10 ML: at 05:37

## 2021-01-01 RX ADMIN — BUDESONIDE AND FORMOTEROL FUMARATE DIHYDRATE 2 PUFF: 160; 4.5 AEROSOL RESPIRATORY (INHALATION) at 19:16

## 2021-01-01 RX ADMIN — HYDRALAZINE HYDROCHLORIDE 25 MG: 50 TABLET, FILM COATED ORAL at 08:56

## 2021-01-01 RX ADMIN — INSULIN LISPRO 5 UNITS: 100 INJECTION, SOLUTION INTRAVENOUS; SUBCUTANEOUS at 12:17

## 2021-01-01 RX ADMIN — HYDROXYZINE PAMOATE 50 MG: 25 CAPSULE ORAL at 14:50

## 2021-01-01 RX ADMIN — CLOPIDOGREL BISULFATE 75 MG: 75 TABLET ORAL at 08:57

## 2021-01-01 RX ADMIN — FUROSEMIDE 60 MG: 10 INJECTION, SOLUTION INTRAMUSCULAR; INTRAVENOUS at 14:50

## 2021-01-01 RX ADMIN — INSULIN GLARGINE 13 UNITS: 100 INJECTION, SOLUTION SUBCUTANEOUS at 21:39

## 2021-01-01 RX ADMIN — ALBUTEROL SULFATE 2 PUFF: 108 AEROSOL, METERED RESPIRATORY (INHALATION) at 08:24

## 2021-01-01 RX ADMIN — GLIPIZIDE 10 MG: 5 TABLET ORAL at 08:57

## 2021-01-01 RX ADMIN — LORAZEPAM 1 MG: 2 SOLUTION, CONCENTRATE ORAL at 11:10

## 2021-01-01 RX ADMIN — AZITHROMYCIN MONOHYDRATE 500 MG: 500 INJECTION, POWDER, LYOPHILIZED, FOR SOLUTION INTRAVENOUS at 11:46

## 2021-01-01 RX ADMIN — LORAZEPAM 1 MG: 2 INJECTION INTRAMUSCULAR; INTRAVENOUS at 13:10

## 2021-01-01 RX ADMIN — INSULIN LISPRO 9 UNITS: 100 INJECTION, SOLUTION INTRAVENOUS; SUBCUTANEOUS at 20:00

## 2021-01-01 RX ADMIN — GLIPIZIDE 10 MG: 5 TABLET ORAL at 09:09

## 2021-01-01 RX ADMIN — BUDESONIDE AND FORMOTEROL FUMARATE DIHYDRATE 2 PUFF: 160; 4.5 AEROSOL RESPIRATORY (INHALATION) at 08:32

## 2021-01-01 RX ADMIN — SODIUM CHLORIDE 100 MG: 9 INJECTION, SOLUTION INTRAVENOUS at 10:29

## 2021-01-01 RX ADMIN — FAMOTIDINE 20 MG: 10 INJECTION, SOLUTION INTRAVENOUS at 10:54

## 2021-01-01 RX ADMIN — DEXAMETHASONE SODIUM PHOSPHATE 4 MG: 4 INJECTION, SOLUTION INTRA-ARTICULAR; INTRALESIONAL; INTRAMUSCULAR; INTRAVENOUS; SOFT TISSUE at 23:32

## 2021-01-01 RX ADMIN — DEXAMETHASONE SODIUM PHOSPHATE 4 MG: 4 INJECTION, SOLUTION INTRA-ARTICULAR; INTRALESIONAL; INTRAMUSCULAR; INTRAVENOUS; SOFT TISSUE at 11:21

## 2021-01-01 RX ADMIN — DEXAMETHASONE SODIUM PHOSPHATE 10 MG: 10 INJECTION INTRAMUSCULAR; INTRAVENOUS at 10:24

## 2021-01-01 RX ADMIN — INSULIN LISPRO 4 UNITS: 100 INJECTION, SOLUTION INTRAVENOUS; SUBCUTANEOUS at 20:20

## 2021-01-01 RX ADMIN — VANCOMYCIN HYDROCHLORIDE 750 MG: 750 INJECTION, POWDER, LYOPHILIZED, FOR SOLUTION INTRAVENOUS at 12:10

## 2021-01-01 RX ADMIN — CHOLECALCIFEROL TAB 25 MCG (1000 UNIT) 2000 UNITS: 25 TAB at 08:40

## 2021-01-01 RX ADMIN — ALBUTEROL SULFATE 2 PUFF: 108 AEROSOL, METERED RESPIRATORY (INHALATION) at 23:12

## 2021-01-01 RX ADMIN — BUDESONIDE AND FORMOTEROL FUMARATE DIHYDRATE 2 PUFF: 160; 4.5 AEROSOL RESPIRATORY (INHALATION) at 20:09

## 2021-01-01 RX ADMIN — INSULIN LISPRO 6 UNITS: 100 INJECTION, SOLUTION INTRAVENOUS; SUBCUTANEOUS at 12:38

## 2021-01-01 RX ADMIN — ALBUTEROL SULFATE 2 PUFF: 108 AEROSOL, METERED RESPIRATORY (INHALATION) at 14:00

## 2021-01-01 RX ADMIN — LIDOCAINE 4%: 4 CREAM TOPICAL at 22:49

## 2021-01-01 RX ADMIN — INSULIN LISPRO 6 UNITS: 100 INJECTION, SOLUTION INTRAVENOUS; SUBCUTANEOUS at 18:15

## 2021-01-01 RX ADMIN — MORPHINE SULFATE 5 MG: 10 SOLUTION ORAL at 11:10

## 2021-01-01 RX ADMIN — Medication 10 ML: at 19:38

## 2021-01-01 RX ADMIN — ALBUTEROL SULFATE 2 PUFF: 108 AEROSOL, METERED RESPIRATORY (INHALATION) at 08:04

## 2021-01-01 RX ADMIN — AMLODIPINE BESYLATE 5 MG: 5 TABLET ORAL at 08:57

## 2021-01-01 RX ADMIN — CEFTRIAXONE 1 G: 1 INJECTION, POWDER, FOR SOLUTION INTRAMUSCULAR; INTRAVENOUS at 09:51

## 2021-01-01 RX ADMIN — LIDOCAINE 4%: 4 CREAM TOPICAL at 21:29

## 2021-01-01 RX ADMIN — KETOROLAC TROMETHAMINE 10 MG: 10 TABLET, FILM COATED ORAL at 10:05

## 2021-01-01 RX ADMIN — Medication 10 ML: at 22:51

## 2021-01-01 RX ADMIN — INSULIN LISPRO 4 UNITS: 100 INJECTION, SOLUTION INTRAVENOUS; SUBCUTANEOUS at 22:00

## 2021-01-01 RX ADMIN — PIPERACILLIN AND TAZOBACTAM 3.38 G: 3; .375 INJECTION, POWDER, LYOPHILIZED, FOR SOLUTION INTRAVENOUS at 14:00

## 2021-01-01 RX ADMIN — Medication 10 ML: at 01:23

## 2021-01-01 RX ADMIN — Medication 10 ML: at 23:22

## 2021-01-01 RX ADMIN — DEXAMETHASONE SODIUM PHOSPHATE 4 MG: 4 INJECTION, SOLUTION INTRA-ARTICULAR; INTRALESIONAL; INTRAMUSCULAR; INTRAVENOUS; SOFT TISSUE at 16:38

## 2021-01-01 RX ADMIN — HYDROXYZINE PAMOATE 50 MG: 25 CAPSULE ORAL at 08:37

## 2021-01-01 RX ADMIN — AMLODIPINE BESYLATE 5 MG: 5 TABLET ORAL at 09:08

## 2021-01-01 RX ADMIN — ALBUTEROL SULFATE 1 PUFF: 108 AEROSOL, METERED RESPIRATORY (INHALATION) at 13:16

## 2021-01-01 RX ADMIN — ALBUTEROL SULFATE 2 PUFF: 108 AEROSOL, METERED RESPIRATORY (INHALATION) at 07:59

## 2021-01-01 RX ADMIN — Medication 10 ML: at 05:21

## 2021-01-01 RX ADMIN — INSULIN LISPRO 15 UNITS: 100 INJECTION, SOLUTION INTRAVENOUS; SUBCUTANEOUS at 17:00

## 2021-01-01 RX ADMIN — INSULIN LISPRO 6 UNITS: 100 INJECTION, SOLUTION INTRAVENOUS; SUBCUTANEOUS at 18:12

## 2021-01-01 RX ADMIN — BUDESONIDE AND FORMOTEROL FUMARATE DIHYDRATE 2 PUFF: 160; 4.5 AEROSOL RESPIRATORY (INHALATION) at 14:42

## 2021-01-01 RX ADMIN — BUDESONIDE AND FORMOTEROL FUMARATE DIHYDRATE 2 PUFF: 160; 4.5 AEROSOL RESPIRATORY (INHALATION) at 21:54

## 2021-01-01 RX ADMIN — AZITHROMYCIN MONOHYDRATE 500 MG: 500 TABLET ORAL at 08:56

## 2021-01-01 RX ADMIN — DEXAMETHASONE 6 MG: 4 TABLET ORAL at 13:26

## 2021-01-01 RX ADMIN — GLIPIZIDE 10 MG: 5 TABLET ORAL at 21:13

## 2021-01-01 RX ADMIN — HEPARIN SODIUM 5000 UNITS: 5000 INJECTION INTRAVENOUS; SUBCUTANEOUS at 16:58

## 2021-01-01 RX ADMIN — HYDRALAZINE HYDROCHLORIDE 50 MG: 50 TABLET, FILM COATED ORAL at 21:14

## 2021-01-01 RX ADMIN — ALBUTEROL SULFATE 2 PUFF: 108 AEROSOL, METERED RESPIRATORY (INHALATION) at 08:32

## 2021-01-01 RX ADMIN — HEPARIN SODIUM 5000 UNITS: 5000 INJECTION INTRAVENOUS; SUBCUTANEOUS at 14:43

## 2021-01-01 RX ADMIN — Medication 10 ML: at 14:31

## 2021-01-01 RX ADMIN — FUROSEMIDE 60 MG: 10 INJECTION, SOLUTION INTRAVENOUS at 17:18

## 2021-01-01 RX ADMIN — Medication 10 ML: at 06:23

## 2021-01-01 RX ADMIN — Medication 10 ML: at 22:25

## 2021-01-01 RX ADMIN — HYDROXYZINE PAMOATE 50 MG: 25 CAPSULE ORAL at 06:04

## 2021-01-01 RX ADMIN — FUROSEMIDE 40 MG: 40 TABLET ORAL at 09:09

## 2021-01-01 RX ADMIN — Medication 10 ML: at 06:09

## 2021-01-01 RX ADMIN — ACETAMINOPHEN 650 MG: 325 TABLET, FILM COATED ORAL at 17:13

## 2021-01-01 RX ADMIN — INSULIN LISPRO 2 UNITS: 100 INJECTION, SOLUTION INTRAVENOUS; SUBCUTANEOUS at 11:30

## 2021-01-01 RX ADMIN — Medication 10 ML: at 21:29

## 2021-01-01 RX ADMIN — HYDRALAZINE HYDROCHLORIDE 50 MG: 50 TABLET, FILM COATED ORAL at 08:58

## 2021-01-01 RX ADMIN — DEXAMETHASONE SODIUM PHOSPHATE 4 MG: 4 INJECTION, SOLUTION INTRA-ARTICULAR; INTRALESIONAL; INTRAMUSCULAR; INTRAVENOUS; SOFT TISSUE at 10:47

## 2021-01-01 RX ADMIN — GLIMEPIRIDE 2 MG: 1 TABLET ORAL at 10:45

## 2021-01-01 RX ADMIN — INSULIN GLARGINE 13 UNITS: 100 INJECTION, SOLUTION SUBCUTANEOUS at 19:45

## 2021-01-01 RX ADMIN — LIDOCAINE 4%: 4 CREAM TOPICAL at 20:10

## 2021-01-01 RX ADMIN — INSULIN LISPRO 6 UNITS: 100 INJECTION, SOLUTION INTRAVENOUS; SUBCUTANEOUS at 10:44

## 2021-01-01 RX ADMIN — INSULIN LISPRO 8 UNITS: 100 INJECTION, SOLUTION INTRAVENOUS; SUBCUTANEOUS at 22:15

## 2021-01-01 RX ADMIN — ATENOLOL 50 MG: 50 TABLET ORAL at 09:09

## 2021-01-01 RX ADMIN — VANCOMYCIN HYDROCHLORIDE 750 MG: 750 INJECTION, POWDER, LYOPHILIZED, FOR SOLUTION INTRAVENOUS at 12:43

## 2021-01-01 RX ADMIN — CLOPIDOGREL BISULFATE 75 MG: 75 TABLET ORAL at 10:55

## 2021-01-01 RX ADMIN — ENOXAPARIN SODIUM 40 MG: 40 INJECTION SUBCUTANEOUS at 21:15

## 2021-01-01 RX ADMIN — INSULIN GLARGINE 13 UNITS: 100 INJECTION, SOLUTION SUBCUTANEOUS at 21:20

## 2021-01-01 RX ADMIN — INSULIN LISPRO 4 UNITS: 100 INJECTION, SOLUTION INTRAVENOUS; SUBCUTANEOUS at 09:05

## 2021-01-01 RX ADMIN — ALBUTEROL SULFATE 1 PUFF: 108 AEROSOL, METERED RESPIRATORY (INHALATION) at 19:56

## 2021-01-01 RX ADMIN — DEXAMETHASONE 6 MG: 4 TABLET ORAL at 01:30

## 2021-01-01 RX ADMIN — INSULIN LISPRO 6 UNITS: 100 INJECTION, SOLUTION INTRAVENOUS; SUBCUTANEOUS at 16:30

## 2021-01-01 RX ADMIN — ENOXAPARIN SODIUM 40 MG: 40 INJECTION SUBCUTANEOUS at 22:47

## 2021-01-01 RX ADMIN — SODIUM CHLORIDE 200 MG: 9 INJECTION, SOLUTION INTRAVENOUS at 14:12

## 2021-01-01 RX ADMIN — ATENOLOL 50 MG: 50 TABLET ORAL at 08:57

## 2021-01-01 RX ADMIN — ENOXAPARIN SODIUM 40 MG: 40 INJECTION SUBCUTANEOUS at 10:05

## 2021-01-01 RX ADMIN — DEXAMETHASONE SODIUM PHOSPHATE 4 MG: 4 INJECTION, SOLUTION INTRA-ARTICULAR; INTRALESIONAL; INTRAMUSCULAR; INTRAVENOUS; SOFT TISSUE at 05:12

## 2021-01-01 RX ADMIN — GLIPIZIDE 10 MG: 5 TABLET ORAL at 10:56

## 2021-01-01 RX ADMIN — HYDRALAZINE HYDROCHLORIDE 25 MG: 50 TABLET, FILM COATED ORAL at 21:13

## 2021-01-01 RX ADMIN — ALBUTEROL SULFATE 2 PUFF: 108 AEROSOL, METERED RESPIRATORY (INHALATION) at 20:38

## 2021-01-01 RX ADMIN — INSULIN LISPRO 3 UNITS: 100 INJECTION, SOLUTION INTRAVENOUS; SUBCUTANEOUS at 08:00

## 2021-01-01 RX ADMIN — CHOLECALCIFEROL TAB 25 MCG (1000 UNIT) 2000 UNITS: 25 TAB at 09:00

## 2021-01-01 RX ADMIN — FUROSEMIDE 60 MG: 10 INJECTION, SOLUTION INTRAMUSCULAR; INTRAVENOUS at 09:45

## 2021-01-01 RX ADMIN — ACETAMINOPHEN 650 MG: 325 TABLET, FILM COATED ORAL at 07:38

## 2021-01-01 RX ADMIN — HYDRALAZINE HYDROCHLORIDE 25 MG: 50 TABLET, FILM COATED ORAL at 22:48

## 2021-01-01 RX ADMIN — BUDESONIDE AND FORMOTEROL FUMARATE DIHYDRATE 2 PUFF: 160; 4.5 AEROSOL RESPIRATORY (INHALATION) at 08:00

## 2021-01-01 RX ADMIN — ATORVASTATIN CALCIUM 40 MG: 40 TABLET, FILM COATED ORAL at 01:22

## 2021-01-01 RX ADMIN — HEPARIN SODIUM 5000 UNITS: 5000 INJECTION INTRAVENOUS; SUBCUTANEOUS at 05:21

## 2021-01-01 RX ADMIN — CHOLECALCIFEROL TAB 25 MCG (1000 UNIT) 2000 UNITS: 25 TAB at 09:26

## 2021-01-01 RX ADMIN — ALBUTEROL SULFATE 2 PUFF: 108 AEROSOL, METERED RESPIRATORY (INHALATION) at 14:42

## 2021-01-01 RX ADMIN — HEPARIN SODIUM 5000 UNITS: 5000 INJECTION INTRAVENOUS; SUBCUTANEOUS at 05:45

## 2021-01-01 RX ADMIN — INSULIN LISPRO 8 UNITS: 100 INJECTION, SOLUTION INTRAVENOUS; SUBCUTANEOUS at 21:13

## 2021-01-01 RX ADMIN — CHOLECALCIFEROL TAB 25 MCG (1000 UNIT) 2000 UNITS: 25 TAB at 09:45

## 2021-01-01 RX ADMIN — KETOROLAC TROMETHAMINE 10 MG: 10 TABLET, FILM COATED ORAL at 16:20

## 2021-01-01 RX ADMIN — Medication 10 ML: at 20:24

## 2021-01-01 RX ADMIN — HYDRALAZINE HYDROCHLORIDE 25 MG: 50 TABLET, FILM COATED ORAL at 09:09

## 2021-01-01 RX ADMIN — ATENOLOL 50 MG: 50 TABLET ORAL at 10:55

## 2021-01-01 RX ADMIN — ENOXAPARIN SODIUM 40 MG: 40 INJECTION SUBCUTANEOUS at 10:24

## 2021-01-01 RX ADMIN — ALBUTEROL SULFATE 2 PUFF: 108 AEROSOL, METERED RESPIRATORY (INHALATION) at 13:55

## 2021-01-01 RX ADMIN — ALBUTEROL SULFATE 2 PUFF: 108 AEROSOL, METERED RESPIRATORY (INHALATION) at 08:37

## 2021-01-01 RX ADMIN — ALBUTEROL SULFATE 2 PUFF: 108 AEROSOL, METERED RESPIRATORY (INHALATION) at 19:16

## 2021-01-01 RX ADMIN — ALBUTEROL SULFATE 2 PUFF: 108 AEROSOL, METERED RESPIRATORY (INHALATION) at 14:10

## 2021-01-01 RX ADMIN — DEXAMETHASONE SODIUM PHOSPHATE 4 MG: 4 INJECTION, SOLUTION INTRA-ARTICULAR; INTRALESIONAL; INTRAMUSCULAR; INTRAVENOUS; SOFT TISSUE at 10:05

## 2021-01-01 RX ADMIN — SODIUM CHLORIDE 100 MG: 9 INJECTION, SOLUTION INTRAVENOUS at 10:47

## 2021-01-01 RX ADMIN — INSULIN LISPRO 4 UNITS: 100 INJECTION, SOLUTION INTRAVENOUS; SUBCUTANEOUS at 21:20

## 2021-01-01 RX ADMIN — Medication 10 ML: at 05:43

## 2021-01-01 RX ADMIN — FAMOTIDINE 20 MG: 10 INJECTION, SOLUTION INTRAVENOUS at 22:15

## 2021-01-01 RX ADMIN — HYDRALAZINE HYDROCHLORIDE 25 MG: 50 TABLET, FILM COATED ORAL at 10:56

## 2021-01-01 RX ADMIN — DEXAMETHASONE 6 MG: 4 TABLET ORAL at 22:44

## 2021-01-01 RX ADMIN — METOPROLOL TARTRATE 25 MG: 25 TABLET, FILM COATED ORAL at 20:54

## 2021-01-01 RX ADMIN — FAMOTIDINE 20 MG: 10 INJECTION, SOLUTION INTRAVENOUS at 22:44

## 2021-01-01 RX ADMIN — HYDROXYZINE PAMOATE 50 MG: 25 CAPSULE ORAL at 21:14

## 2021-01-01 RX ADMIN — DEXAMETHASONE SODIUM PHOSPHATE 4 MG: 4 INJECTION, SOLUTION INTRA-ARTICULAR; INTRALESIONAL; INTRAMUSCULAR; INTRAVENOUS; SOFT TISSUE at 05:45

## 2021-01-01 RX ADMIN — BUDESONIDE AND FORMOTEROL FUMARATE DIHYDRATE 2 PUFF: 160; 4.5 AEROSOL RESPIRATORY (INHALATION) at 07:59

## 2021-01-01 RX ADMIN — FUROSEMIDE 60 MG: 10 INJECTION, SOLUTION INTRAVENOUS at 21:18

## 2021-01-01 RX ADMIN — ATORVASTATIN CALCIUM 40 MG: 40 TABLET, FILM COATED ORAL at 22:45

## 2021-01-01 RX ADMIN — ALBUTEROL SULFATE 2 PUFF: 108 AEROSOL, METERED RESPIRATORY (INHALATION) at 21:00

## 2021-01-01 RX ADMIN — INSULIN LISPRO 5 UNITS: 100 INJECTION, SOLUTION INTRAVENOUS; SUBCUTANEOUS at 07:30

## 2021-01-01 RX ADMIN — FAMOTIDINE 20 MG: 10 INJECTION, SOLUTION INTRAVENOUS at 10:25

## 2021-01-01 RX ADMIN — HEPARIN SODIUM 5000 UNITS: 5000 INJECTION INTRAVENOUS; SUBCUTANEOUS at 06:03

## 2021-01-01 RX ADMIN — ENOXAPARIN SODIUM 40 MG: 40 INJECTION SUBCUTANEOUS at 09:09

## 2021-01-01 RX ADMIN — DEXAMETHASONE SODIUM PHOSPHATE 10 MG: 10 INJECTION INTRAMUSCULAR; INTRAVENOUS at 21:00

## 2021-01-01 RX ADMIN — LIDOCAINE 4%: 4 CREAM TOPICAL at 09:00

## 2021-01-01 RX ADMIN — Medication 10 ML: at 13:28

## 2021-01-01 RX ADMIN — ATORVASTATIN CALCIUM 40 MG: 40 TABLET, FILM COATED ORAL at 21:13

## 2021-01-01 RX ADMIN — Medication 10 ML: at 14:09

## 2021-01-01 RX ADMIN — CHOLECALCIFEROL TAB 25 MCG (1000 UNIT) 2000 UNITS: 25 TAB at 07:59

## 2021-01-01 RX ADMIN — GLIMEPIRIDE 2 MG: 1 TABLET ORAL at 08:37

## 2021-01-01 RX ADMIN — FUROSEMIDE 60 MG: 10 INJECTION, SOLUTION INTRAMUSCULAR; INTRAVENOUS at 09:27

## 2021-01-01 RX ADMIN — BUDESONIDE AND FORMOTEROL FUMARATE DIHYDRATE 2 PUFF: 160; 4.5 AEROSOL RESPIRATORY (INHALATION) at 08:24

## 2021-01-01 RX ADMIN — LOSARTAN POTASSIUM 50 MG: 50 TABLET, FILM COATED ORAL at 09:08

## 2021-01-01 RX ADMIN — ALBUTEROL SULFATE 2 PUFF: 108 AEROSOL, METERED RESPIRATORY (INHALATION) at 20:09

## 2021-01-01 RX ADMIN — ALBUTEROL SULFATE 2 PUFF: 108 AEROSOL, METERED RESPIRATORY (INHALATION) at 13:44

## 2021-01-01 RX ADMIN — ALBUTEROL SULFATE 2 PUFF: 108 AEROSOL, METERED RESPIRATORY (INHALATION) at 09:29

## 2021-01-01 RX ADMIN — DEXAMETHASONE SODIUM PHOSPHATE 4 MG: 4 INJECTION, SOLUTION INTRA-ARTICULAR; INTRALESIONAL; INTRAMUSCULAR; INTRAVENOUS; SOFT TISSUE at 05:41

## 2021-01-01 RX ADMIN — INSULIN LISPRO 10 UNITS: 100 INJECTION, SOLUTION INTRAVENOUS; SUBCUTANEOUS at 09:26

## 2021-01-01 RX ADMIN — DEXAMETHASONE SODIUM PHOSPHATE 4 MG: 4 INJECTION, SOLUTION INTRA-ARTICULAR; INTRALESIONAL; INTRAMUSCULAR; INTRAVENOUS; SOFT TISSUE at 22:49

## 2021-01-01 RX ADMIN — LORATADINE 10 MG: 10 TABLET ORAL at 09:08

## 2021-01-01 RX ADMIN — LIDOCAINE 4%: 4 CREAM TOPICAL at 17:12

## 2021-01-01 RX ADMIN — FUROSEMIDE 60 MG: 10 INJECTION, SOLUTION INTRAMUSCULAR; INTRAVENOUS at 09:00

## 2021-01-01 RX ADMIN — DEXAMETHASONE SODIUM PHOSPHATE 4 MG: 4 INJECTION, SOLUTION INTRA-ARTICULAR; INTRALESIONAL; INTRAMUSCULAR; INTRAVENOUS; SOFT TISSUE at 22:45

## 2021-01-01 RX ADMIN — ENOXAPARIN SODIUM 40 MG: 40 INJECTION SUBCUTANEOUS at 09:52

## 2021-01-01 RX ADMIN — MAGNESIUM SULFATE HEPTAHYDRATE 1 G: 1 INJECTION, SOLUTION INTRAVENOUS at 16:14

## 2021-01-01 RX ADMIN — INSULIN LISPRO 6 UNITS: 100 INJECTION, SOLUTION INTRAVENOUS; SUBCUTANEOUS at 16:20

## 2021-01-01 RX ADMIN — SODIUM CHLORIDE 100 MG: 9 INJECTION, SOLUTION INTRAVENOUS at 10:00

## 2021-01-01 RX ADMIN — FUROSEMIDE 60 MG: 10 INJECTION, SOLUTION INTRAMUSCULAR; INTRAVENOUS at 08:37

## 2021-01-01 RX ADMIN — ALBUTEROL SULFATE 2 PUFF: 108 AEROSOL, METERED RESPIRATORY (INHALATION) at 02:44

## 2021-01-01 RX ADMIN — DEXAMETHASONE 6 MG: 4 TABLET ORAL at 08:55

## 2021-01-01 RX ADMIN — ALBUTEROL SULFATE 2 PUFF: 108 AEROSOL, METERED RESPIRATORY (INHALATION) at 08:00

## 2021-01-01 RX ADMIN — DEXAMETHASONE SODIUM PHOSPHATE 4 MG: 4 INJECTION, SOLUTION INTRA-ARTICULAR; INTRALESIONAL; INTRAMUSCULAR; INTRAVENOUS; SOFT TISSUE at 21:15

## 2021-01-01 RX ADMIN — ALBUTEROL SULFATE 2 PUFF: 108 AEROSOL, METERED RESPIRATORY (INHALATION) at 14:15

## 2021-01-01 RX ADMIN — INSULIN LISPRO 4 UNITS: 100 INJECTION, SOLUTION INTRAVENOUS; SUBCUTANEOUS at 19:45

## 2021-01-01 RX ADMIN — VANCOMYCIN HYDROCHLORIDE 750 MG: 750 INJECTION, POWDER, LYOPHILIZED, FOR SOLUTION INTRAVENOUS at 11:56

## 2021-01-01 RX ADMIN — ALBUTEROL SULFATE 2 PUFF: 108 AEROSOL, METERED RESPIRATORY (INHALATION) at 14:18

## 2021-01-01 RX ADMIN — BUDESONIDE AND FORMOTEROL FUMARATE DIHYDRATE 2 PUFF: 160; 4.5 AEROSOL RESPIRATORY (INHALATION) at 19:18

## 2021-01-01 RX ADMIN — BUDESONIDE AND FORMOTEROL FUMARATE DIHYDRATE 2 PUFF: 160; 4.5 AEROSOL RESPIRATORY (INHALATION) at 23:12

## 2021-01-01 RX ADMIN — BUDESONIDE AND FORMOTEROL FUMARATE DIHYDRATE 2 PUFF: 160; 4.5 AEROSOL RESPIRATORY (INHALATION) at 20:48

## 2021-01-01 RX ADMIN — FUROSEMIDE 60 MG: 10 INJECTION, SOLUTION INTRAMUSCULAR; INTRAVENOUS at 11:45

## 2021-01-01 RX ADMIN — DEXAMETHASONE SODIUM PHOSPHATE 4 MG: 4 INJECTION, SOLUTION INTRA-ARTICULAR; INTRALESIONAL; INTRAMUSCULAR; INTRAVENOUS; SOFT TISSUE at 17:12

## 2021-01-01 RX ADMIN — Medication 10 ML: at 16:14

## 2021-01-01 RX ADMIN — DEXAMETHASONE SODIUM PHOSPHATE 4 MG: 4 INJECTION, SOLUTION INTRA-ARTICULAR; INTRALESIONAL; INTRAMUSCULAR; INTRAVENOUS; SOFT TISSUE at 05:23

## 2021-01-01 RX ADMIN — INSULIN LISPRO 8 UNITS: 100 INJECTION, SOLUTION INTRAVENOUS; SUBCUTANEOUS at 13:17

## 2021-01-01 RX ADMIN — FUROSEMIDE 60 MG: 10 INJECTION, SOLUTION INTRAMUSCULAR; INTRAVENOUS at 10:44

## 2021-01-01 RX ADMIN — Medication 10 ML: at 22:48

## 2021-01-01 RX ADMIN — INSULIN LISPRO 6 UNITS: 100 INJECTION, SOLUTION INTRAVENOUS; SUBCUTANEOUS at 16:54

## 2021-01-01 RX ADMIN — DEXAMETHASONE 4 MG: 4 TABLET ORAL at 11:45

## 2021-01-01 RX ADMIN — ALBUTEROL SULFATE 2 PUFF: 108 AEROSOL, METERED RESPIRATORY (INHALATION) at 19:18

## 2021-01-01 RX ADMIN — HYDRALAZINE HYDROCHLORIDE 50 MG: 50 TABLET, FILM COATED ORAL at 09:08

## 2021-01-01 RX ADMIN — CHOLECALCIFEROL TAB 25 MCG (1000 UNIT) 2000 UNITS: 25 TAB at 10:05

## 2021-01-01 RX ADMIN — INSULIN LISPRO 3 UNITS: 100 INJECTION, SOLUTION INTRAVENOUS; SUBCUTANEOUS at 16:25

## 2021-01-01 RX ADMIN — DEXAMETHASONE SODIUM PHOSPHATE 4 MG: 4 INJECTION, SOLUTION INTRA-ARTICULAR; INTRALESIONAL; INTRAMUSCULAR; INTRAVENOUS; SOFT TISSUE at 11:00

## 2021-01-01 RX ADMIN — INSULIN LISPRO 2 UNITS: 100 INJECTION, SOLUTION INTRAVENOUS; SUBCUTANEOUS at 01:22

## 2021-01-01 RX ADMIN — HYDRALAZINE HYDROCHLORIDE 50 MG: 50 TABLET, FILM COATED ORAL at 22:47

## 2021-01-01 RX ADMIN — Medication 10 ML: at 13:08

## 2021-01-01 RX ADMIN — ACETAMINOPHEN 650 MG: 325 TABLET, FILM COATED ORAL at 09:45

## 2021-01-01 RX ADMIN — FUROSEMIDE 60 MG: 10 INJECTION, SOLUTION INTRAMUSCULAR; INTRAVENOUS at 11:36

## 2021-01-01 RX ADMIN — DEXAMETHASONE 6 MG: 4 TABLET ORAL at 11:21

## 2021-01-01 RX ADMIN — Medication 10 ML: at 14:26

## 2021-01-01 RX ADMIN — FUROSEMIDE 60 MG: 10 INJECTION, SOLUTION INTRAMUSCULAR; INTRAVENOUS at 07:59

## 2021-01-01 RX ADMIN — CLOPIDOGREL BISULFATE 75 MG: 75 TABLET ORAL at 09:09

## 2021-01-01 RX ADMIN — HEPARIN SODIUM 5000 UNITS: 5000 INJECTION INTRAVENOUS; SUBCUTANEOUS at 17:18

## 2021-01-01 RX ADMIN — DEXAMETHASONE SODIUM PHOSPHATE 4 MG: 4 INJECTION, SOLUTION INTRA-ARTICULAR; INTRALESIONAL; INTRAMUSCULAR; INTRAVENOUS; SOFT TISSUE at 12:38

## 2021-01-01 RX ADMIN — Medication 10 ML: at 04:38

## 2021-01-01 RX ADMIN — DEXAMETHASONE SODIUM PHOSPHATE 4 MG: 4 INJECTION, SOLUTION INTRA-ARTICULAR; INTRALESIONAL; INTRAMUSCULAR; INTRAVENOUS; SOFT TISSUE at 18:15

## 2021-01-01 RX ADMIN — DEXAMETHASONE 4 MG: 4 TABLET ORAL at 21:40

## 2021-01-01 RX ADMIN — BUDESONIDE AND FORMOTEROL FUMARATE DIHYDRATE 2 PUFF: 160; 4.5 AEROSOL RESPIRATORY (INHALATION) at 08:37

## 2021-01-01 RX ADMIN — ALBUTEROL SULFATE 2 PUFF: 108 AEROSOL, METERED RESPIRATORY (INHALATION) at 13:09

## 2021-01-01 RX ADMIN — DEXAMETHASONE SODIUM PHOSPHATE 4 MG: 4 INJECTION, SOLUTION INTRA-ARTICULAR; INTRALESIONAL; INTRAMUSCULAR; INTRAVENOUS; SOFT TISSUE at 10:44

## 2021-01-01 RX ADMIN — FUROSEMIDE 60 MG: 10 INJECTION, SOLUTION INTRAVENOUS at 15:34

## 2021-01-01 RX ADMIN — Medication 10 ML: at 15:35

## 2021-01-01 RX ADMIN — Medication 20 ML: at 14:00

## 2021-01-01 RX ADMIN — FUROSEMIDE 40 MG: 40 TABLET ORAL at 10:55

## 2021-01-01 RX ADMIN — ACETAMINOPHEN 650 MG: 325 TABLET, FILM COATED ORAL at 19:38

## 2021-01-01 RX ADMIN — METOPROLOL TARTRATE 5 MG: 5 INJECTION INTRAVENOUS at 09:05

## 2021-01-01 RX ADMIN — AZITHROMYCIN DIHYDRATE 500 MG: 500 INJECTION, POWDER, LYOPHILIZED, FOR SOLUTION INTRAVENOUS at 09:50

## 2021-01-01 RX ADMIN — VANCOMYCIN HYDROCHLORIDE 500 MG: 500 INJECTION, POWDER, LYOPHILIZED, FOR SOLUTION INTRAVENOUS at 21:14

## 2021-01-01 RX ADMIN — DEXAMETHASONE SODIUM PHOSPHATE 4 MG: 4 INJECTION, SOLUTION INTRA-ARTICULAR; INTRALESIONAL; INTRAMUSCULAR; INTRAVENOUS; SOFT TISSUE at 16:20

## 2021-01-01 RX ADMIN — ALBUTEROL SULFATE 2 PUFF: 108 AEROSOL, METERED RESPIRATORY (INHALATION) at 03:08

## 2021-01-01 RX ADMIN — INSULIN LISPRO 5 UNITS: 100 INJECTION, SOLUTION INTRAVENOUS; SUBCUTANEOUS at 10:24

## 2021-01-01 RX ADMIN — INSULIN LISPRO 6 UNITS: 100 INJECTION, SOLUTION INTRAVENOUS; SUBCUTANEOUS at 20:09

## 2021-01-01 RX ADMIN — ALBUTEROL SULFATE 2 PUFF: 108 AEROSOL, METERED RESPIRATORY (INHALATION) at 02:56

## 2021-01-01 RX ADMIN — Medication 10 ML: at 13:18

## 2021-01-01 RX ADMIN — CHOLECALCIFEROL TAB 25 MCG (1000 UNIT) 2000 UNITS: 25 TAB at 10:45

## 2021-01-01 RX ADMIN — ALBUTEROL SULFATE 2 PUFF: 108 AEROSOL, METERED RESPIRATORY (INHALATION) at 15:02

## 2021-01-01 RX ADMIN — INSULIN LISPRO 2 UNITS: 100 INJECTION, SOLUTION INTRAVENOUS; SUBCUTANEOUS at 22:46

## 2021-01-01 RX ADMIN — DEXAMETHASONE SODIUM PHOSPHATE 10 MG: 10 INJECTION, SOLUTION INTRAMUSCULAR; INTRAVENOUS at 11:46

## 2021-01-01 RX ADMIN — AZITHROMYCIN MONOHYDRATE 500 MG: 500 TABLET ORAL at 10:55

## 2021-01-01 RX ADMIN — HYDRALAZINE HYDROCHLORIDE 50 MG: 50 TABLET, FILM COATED ORAL at 11:27

## 2021-01-01 RX ADMIN — INSULIN LISPRO 3 UNITS: 100 INJECTION, SOLUTION INTRAVENOUS; SUBCUTANEOUS at 16:30

## 2021-01-01 RX ADMIN — HEPARIN SODIUM 5000 UNITS: 5000 INJECTION INTRAVENOUS; SUBCUTANEOUS at 16:39

## 2021-01-01 RX ADMIN — BUDESONIDE AND FORMOTEROL FUMARATE DIHYDRATE 2 PUFF: 160; 4.5 AEROSOL RESPIRATORY (INHALATION) at 08:43

## 2021-01-01 RX ADMIN — HYDROXYZINE PAMOATE 50 MG: 25 CAPSULE ORAL at 07:38

## 2021-01-01 RX ADMIN — CEFTRIAXONE SODIUM 1 G: 1 INJECTION, POWDER, FOR SOLUTION INTRAMUSCULAR; INTRAVENOUS at 22:46

## 2021-01-01 RX ADMIN — INSULIN LISPRO 6 UNITS: 100 INJECTION, SOLUTION INTRAVENOUS; SUBCUTANEOUS at 07:30

## 2021-01-01 RX ADMIN — CHOLECALCIFEROL TAB 25 MCG (1000 UNIT) 2000 UNITS: 25 TAB at 11:45

## 2021-01-22 PROBLEM — J96.00 ACUTE RESPIRATORY FAILURE DUE TO COVID-19 (HCC): Status: ACTIVE | Noted: 2021-01-01

## 2021-01-22 PROBLEM — U07.1 ACUTE RESPIRATORY FAILURE DUE TO COVID-19 (HCC): Status: ACTIVE | Noted: 2021-01-01

## 2021-01-22 NOTE — PROGRESS NOTES
Primary Nurse Carmen Mahmood RN and Morris Velasquez RN performed a dual skin assessment on this patient. Stage 2 noted on sacrum. No other skin impairments.

## 2021-01-22 NOTE — H&P
History and Physical 
 
Patient: Donal Sparrow MRN: [id number]  SSN: [id number] YOB: 1940  Age: 78 y.o. Sex: female Subjective: Chief Complaint:SOB since 2 days HPI: Donal Sparrow is a 78 y.o. female who has past medical history of hypertension, diabetes, hyperlipidemia came to ER with a complaint of fever, generalized fatigue and flulike symptoms. Patient was also complaining of shortness of breath and cough. She called her primary care physician who advised her to come to the ER. On evaluation in ER patient was found to have Covid positive on screening. She was also found to be hypoxicOn room air requiring 5 L oxygen by nasal cannula. Hospitalist service was requested to admit the patient for further work-up and management. Past Medical History:  
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  
 History of CVA (cerebrovascular accident) 10/5/2020  Morbid obesity ([address] 10/5/2020  Pure hypercholesterolemia 10/5/2020  Urinary incontinence 10/5/2020 No past surgical history on file. Family History Family history unknown: Yes  
 
Social History Tobacco Use  Smoking status: Former Smoker  Tobacco comment: QUIT 50 YEARS AGO Substance Use Topics  Alcohol use: Not on file Prior to Admission medications Medication Sig Start Date End Date Taking? Authorizing Provider  
loratadine (CLARITIN) 10 mg tablet TAKE 1 TABLET BY MOUTH DAILY. 11/2/20   Russel Hughes MD  
atorvastatin (LIPITOR) 40 mg tablet TAKE ONE TABLET BY MOUTH AT BEDTIME. 10/11/20   Russel Hughes MD  
clopidogreL (PLAVIX) 75 mg tab TAKE ONE TABLET BY MOUTH DAILY.  10/11/20   Russel Hughes MD  
atenoloL (TENORMIN) 50 mg tablet TAKE 1 TABLET BY MOUTH TWICE DAILY 10/11/20   Russel Hughes MD  
amLODIPine (NORVASC) 10 mg tablet TAKE ONE TABLET BY MOUTH DAILY. 10/11/20   Winter Light MD  
omeprazole (PRILOSEC) 40 mg capsule TAKE 1 CAPSULE BY MOUTH ONCE DAILY 30 MINUTES BEFORE EATING. 10/11/20   Winter Light MD  
losartan (COZAAR) 50 mg tablet Take 1 Tab by mouth daily. 8/25/17   Provider, Historical  
hydrALAZINE (APRESOLINE) 25 mg tablet Take 1 Tab by mouth two (2) times a day. 8/25/17   Provider, Historical  
furosemide (LASIX) 40 mg tablet Take 1 Tab by mouth daily. 8/25/17   Provider, Historical  
glipiZIDE (GLUCOTROL) 10 mg tablet Take 10 mg by mouth two (2) times a day. Provider, Historical  
hydrALAZINE (APRESOLINE) 50 mg tablet Take 1 Tab by mouth two (2) times a day. Dose increased from 25 to 50 mg twice a day  Indications: high blood pressure 10/6/20   Winter Light MD  
  
 
Allergies Allergen Reactions  Sulfa (Sulfonamide Antibiotics) Angioedema  Contrast Dye [Iodine] Itching Review of Systems: 
Constitutional: + fevers, No chills, ++ fatigue, No weakness Eyes: No visual disturbance Ears, Nose, Mouth, Throat, and Face: No nasal congestion, No sore throat Respiratory:++cough, No sputum, No wheezing, No SOB Cardiovascular: No chest pain, No lower extremity edema, No Palpitations Gastrointestinal: No nausea, No vomiting, No diarrhea, No constipation, No abdominal pain Genitourinary: No frequency, No dysuria, No hematuria Integument/Breast: No rash, No skin lesion(s), No dryness Musculoskeletal: No arthralgias, No neck pain, No back pain Neurological: No headaches, No dizziness, No confusion,  No seizures Behavioral/Psychiatric: No anxiety, No depression Objective:  
 
Vitals:  
 01/22/21 1041 BP: (!) 164/75 Pulse: 89 Resp: 22 Temp: 99.7 °F (37.6 °C) SpO2: 97% Weight: 127 kg (280 lb) Height: 5' 4\" (1.626 m) Physical Exam: 
General: Alert and Oriented x 3. Cooperative and friendly. No acute distress. HEAD: Normocephalic, atraumatic. Eye: PERRLA, EOMI.   
Throat and Neck: normal and no erythema or exudates noted. No mass Lung: +ve for wheezing Heart: Regular rate and rhythm. Normal S1/S2. NO appreciated murmurs, rubs or gallops. Abdomen: soft, non-tender. Bowel sounds present in all four quadrants. No masses appreciated. Extremities:  able to move all extremities normal, atraumatic Skin: Clean, dry and intact without appreciated lesions. Neurologic: AOx3. Cranial nerves 2-12 and sensation grossly intact. Psychiatric: non focal, normal affect, normal thought process CBC:  
Lab Results Component Value Date/Time WBC 4.4 01/22/2021 10:40 AM  
 RBC 4.61 01/22/2021 10:40 AM  
 HGB 12.3 01/22/2021 10:40 AM  
 HCT 37.5 01/22/2021 10:40 AM  
 PLATELET 633 35/49/2606 10:40 AM  
 
CMP:  
Lab Results Component Value Date/Time Glucose 186 (H) 01/22/2021 10:40 AM  
 Sodium 141 01/22/2021 10:40 AM  
 Potassium 3.9 01/22/2021 10:40 AM  
 Chloride 102 01/22/2021 10:40 AM  
 CO2 31 01/22/2021 10:40 AM  
 BUN 12 01/22/2021 10:40 AM  
 Creatinine 1.04 (H) 01/22/2021 10:40 AM  
 Calcium 9.4 01/22/2021 10:40 AM  
 Anion gap 8 01/22/2021 10:40 AM  
 BUN/Creatinine ratio 12 01/22/2021 10:40 AM  
 Alk. phosphatase 85 01/22/2021 10:40 AM  
 Protein, total 8.0 01/22/2021 10:40 AM  
 Albumin 3.5 01/22/2021 10:40 AM  
 Globulin 4.5 (H) 01/22/2021 10:40 AM  
 A-G Ratio 0.8 (L) 01/22/2021 10:40 AM  
 
ABGs: No results found for: PH, PO2, PCO2, HCO3, BD, BE 
Radiology review:  
XR CHEST SNGL V Final Result Impression: The cardiomediastinal silhouette is appropriate for age, technique,  
and lung expansion. Pulmonary vasculature is not congested. The lungs are  
essentially clear. No effusion or pneumothorax is seen. Assessment:  
 
Active Problems: 
  Acute respiratory failure due to COVID-19 [facility]) (1/22/2021) Plan:  
 
Acute respiratory failure due to Covid pneumonia: Start the patient on dexamethasone, Zithromax. .  Will get ID evaluation of the patient to consider Actemra and remdesivir. Continue patient on oxygen supplementation. Pulmonary evaluation of the patient will also be requested. We will give nebulization treatment to the patient. 2.  Diabetes mellitus: Continue patient on her home medications. We will give sliding scale coverage to the patient as well 3. Hypertension: Continue patient's home medications. Further adjustment of medication will depend upon patient's clinical course in the hospital 
DVT and GI prophylaxis will be given to the patient Further plan of management will depend upon patient's clinical course in the hospital. 
 
Signed By: Joe Mancera MD   
 January 22, 2021

## 2021-01-22 NOTE — ED TRIAGE NOTES
Fever x 2days  Coming from home, vomit this am, pulse ox at home said 88, ems got 91, put on 2l came to 98%

## 2021-01-22 NOTE — ED PROVIDER NOTES
EMERGENCY DEPARTMENT HISTORY AND PHYSICAL EXAM 
 
 
Date: 1/22/2021 Patient Name: Margo Reyes History of Presenting Illness Chief Complaint Patient presents with  Fever History Provided By: Patient HPI: Margo Reyes, 78 y.o. female with a past medical history significant No significant past medical history presents to the ED with chief complaint of Fever Freeman Medina 78-year-old female with a history of congestive heart failure. Patient is having fever. Cough congestion with shortness of breath. No known exposure to Covid. Does feel weak. No antipyretics prior to arrival.  Slight swelling of the legs. No change in any medications. There are no other complaints, changes, or physical findings at this time. PCP: Yousif Oglesby MD 
 
Current Facility-Administered Medications Medication Dose Route Frequency Provider Last Rate Last Admin  dexamethasone (PF) (DECADRON) 10 mg/mL injection 10 mg  10 mg IntraVENous Q12H Kale Bhatia MD      
 sodium chloride (NS) flush 5-40 mL  5-40 mL IntraVENous Q8H Kale Bhatia MD      
 sodium chloride (NS) flush 5-40 mL  5-40 mL IntraVENous PRN Kale Bhatia MD      
 acetaminophen (TYLENOL) tablet 650 mg  650 mg Oral Q6H PRN Kale Bhatia MD      
 Or  
[facility] Sicard acetaminophen (TYLENOL) suppository 650 mg  650 mg Rectal Q6H PRN Kale Bhatia MD      
 polyethylene glycol (MIRALAX) packet 17 g  17 g Oral DAILY PRN Kale Bhatia MD      
 promethazine (PHENERGAN) tablet 12.5 mg  12.5 mg Oral Q6H PRN Kale Bhatia MD      
 Or  
 ondansetron [facility]) injection 4 mg  4 mg IntraVENous Q6H PRN MD Bertrand Jo Kim ON 1/23/2021] enoxaparin (LOVENOX) injection 40 mg  40 mg SubCUTAneous DAILY Kale Bhatia MD      
 famotidine (PF) (PEPCID) 20 mg in 0.9% sodium chloride 10 mL injection  20 mg IntraVENous BID Kale Bhatia MD      
 magnesium sulfate 1 g/100 ml IVPB (premix or compounded)  1 g IntraVENous Doloris Shade Kam Desai MD      
 albuterol (PROVENTIL HFA, VENTOLIN HFA, PROAIR HFA) inhaler 1 Puff  1 Puff Inhalation Q6H RT Jessica Watts MD   Stopped at 01/22/21 1400  [START ON 1/23/2021] azithromycin (ZITHROMAX) 500 mg in 0.9% sodium chloride 250 mL (VIAL-MATE)  500 mg IntraVENous Q24H Jessica Watts MD      
 glucose chewable tablet 16 g  4 Tab Oral PRN Jessica Watts MD      
 dextrose (D50W) injection syrg 12.5-25 g  25-50 mL IntraVENous PRN Jessica Watts MD      
 glucagon [facility]) injection 1 mg  1 mg IntraMUSCular PRN Jessica Watts MD      
 insulin lispro (HUMALOG) injection   SubCUTAneous AC&HS Jessica Watts MD      
 
Current Outpatient Medications Medication Sig Dispense Refill  loratadine (CLARITIN) 10 mg tablet TAKE 1 TABLET BY MOUTH DAILY. 30 Tab 5  
 atorvastatin (LIPITOR) 40 mg tablet TAKE ONE TABLET BY MOUTH AT BEDTIME. 90 Tab 1  clopidogreL (PLAVIX) 75 mg tab TAKE ONE TABLET BY MOUTH DAILY. 90 Tab 1  
 atenoloL (TENORMIN) 50 mg tablet TAKE 1 TABLET BY MOUTH TWICE DAILY 180 Tab 1  
 amLODIPine (NORVASC) 10 mg tablet TAKE ONE TABLET BY MOUTH DAILY. 90 Tab 1  
 omeprazole (PRILOSEC) 40 mg capsule TAKE 1 CAPSULE BY MOUTH ONCE DAILY 30 MINUTES BEFORE EATING. 90 Cap 1  
 losartan (COZAAR) 50 mg tablet Take 1 Tab by mouth daily.  hydrALAZINE (APRESOLINE) 25 mg tablet Take 1 Tab by mouth two (2) times a day.  furosemide (LASIX) 40 mg tablet Take 1 Tab by mouth daily.  glipiZIDE (GLUCOTROL) 10 mg tablet Take 10 mg by mouth two (2) times a day.  hydrALAZINE (APRESOLINE) 50 mg tablet Take 1 Tab by mouth two (2) times a day. Dose increased from 25 to 50 mg twice a day  Indications: high blood pressure 180 Tab 1 Past History Past Medical History: 
Past Medical History:  
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 Family History: 
Family History Family history unknown: Yes  
 
 
Social History: 
Social History Tobacco Use  Smoking status: Former Smoker  Tobacco comment: QUIT 50 YEARS AGO Substance Use Topics  Alcohol use: Not on file  Drug use: Never Allergies: Allergies Allergen Reactions  Sulfa (Sulfonamide Antibiotics) Angioedema  Contrast Dye [Iodine] Itching Review of Systems Review of Systems Constitutional: Positive for fatigue and fever. Negative for chills. HENT: Negative. Negative for congestion, nosebleeds and sore throat. Eyes: Negative. Negative for pain, discharge and visual disturbance. Respiratory: Positive for cough and shortness of breath. Negative for chest tightness. Cardiovascular: Negative for chest pain, palpitations and leg swelling. Gastrointestinal: Negative for abdominal pain, blood in stool, constipation, diarrhea, nausea and vomiting. Endocrine: Negative. Genitourinary: Negative. Negative for difficulty urinating, dysuria, pelvic pain and vaginal bleeding. Musculoskeletal: Negative. Negative for arthralgias, back pain and myalgias. Skin: Negative. Negative for rash and wound. Allergic/Immunologic: Negative. Neurological: Negative. Negative for dizziness, syncope, weakness, numbness and headaches. Hematological: Negative. Psychiatric/Behavioral: Negative. Negative for agitation, confusion and suicidal ideas. All other systems reviewed and are negative. Physical Exam  
Physical Exam 
Vitals signs and nursing note reviewed. Exam conducted with a chaperone present. Constitutional:   
   Appearance: Normal appearance. She is normal weight.   
HENT:  
   Head: Normocephalic and atraumatic. Nose: Nose normal.  
   Mouth/Throat:  
   Mouth: Mucous membranes are moist.  
   Pharynx: Oropharynx is clear. Eyes:  
   Extraocular Movements: Extraocular movements intact. Conjunctiva/sclera: Conjunctivae normal.  
   Pupils: Pupils are equal, round, and reactive to light. Neck: Musculoskeletal: Normal range of motion and neck supple. Cardiovascular:  
   Rate and Rhythm: Regular rhythm. Tachycardia present. Pulses: Normal pulses. Heart sounds: Normal heart sounds. Pulmonary:  
   Effort: Respiratory distress present. Breath sounds: Normal breath sounds. Abdominal:  
   General: Abdomen is flat. Bowel sounds are normal. There is no distension. Palpations: Abdomen is soft. Tenderness: There is no abdominal tenderness. There is no guarding. Musculoskeletal: Normal range of motion. General: No swelling, tenderness, deformity or signs of injury. Right lower leg: No edema. Left lower leg: No edema. Skin: 
   General: Skin is warm and dry. Capillary Refill: Capillary refill takes less than 2 seconds. Findings: No lesion or rash. Neurological:  
   General: No focal deficit present. Mental Status: She is alert and oriented to person, place, and time. Mental status is at baseline. Cranial Nerves: No cranial nerve deficit. Psychiatric:     
   Mood and Affect: Mood normal.     
   Behavior: Behavior normal.     
   Thought Content: Thought content normal.     
   Judgment: Judgment normal.  
 
 
 
Diagnostic Study Results Labs - Recent Results (from the past 12 hour(s)) PROTHROMBIN TIME + INR Collection Time: 01/22/21 10:40 AM  
Result Value Ref Range Prothrombin time 12.9 11.9 - 14.7 sec INR 1.0 0.9 - 1.1 PTT Collection Time: 01/22/21 10:40 AM  
Result Value Ref Range aPTT 30.6 23.0 - 35.7 sec  
 aPTT, therapeutic range   sec CBC WITH AUTOMATED DIFF  Collection Time: 01/22/21 10:40 AM  
Result Value Ref Range WBC 4.4 3.6 - 11.0 K/uL  
 RBC 4.61 3.80 - 5.20 M/uL  
 HGB 12.3 11.5 - 16.0 g/dL HCT 37.5 35.0 - 47.0 % MCV 81.3 80.0 - 99.0 FL  
 MCH 26.7 26.0 - 34.0 PG  
 MCHC 32.8 30.0 - 36.5 g/dL  
 RDW 15.2 (H) 11.5 - 14.5 % PLATELET 643 278 - 637 K/uL MPV 10.2 8.9 - 12.9 FL  
 NEUTROPHILS 59 32 - 75 % LYMPHOCYTES 31 12 - 49 % MONOCYTES 10 5 - 13 % EOSINOPHILS 0 0 - 7 % BASOPHILS 0 0 - 1 % IMMATURE GRANULOCYTES 0 0.0 - 0.5 % ABS. NEUTROPHILS 2.6 1.8 - 8.0 K/UL  
 ABS. LYMPHOCYTES 1.4 0.8 - 3.5 K/UL  
 ABS. MONOCYTES 0.4 0.0 - 1.0 K/UL  
 ABS. EOSINOPHILS 0.0 0.0 - 0.4 K/UL  
 ABS. BASOPHILS 0.0 0.0 - 0.1 K/UL  
 ABS. IMM. GRANS. 0.0 0.00 - 0.04 K/UL  
 DF AUTOMATED    
LACTIC ACID Collection Time: 01/22/21 10:40 AM  
Result Value Ref Range Lactic acid 0.9 0.4 - 2.0 mmol/L MAGNESIUM Collection Time: 01/22/21 10:40 AM  
Result Value Ref Range Magnesium 1.6 1.6 - 2.4 mg/dL METABOLIC PANEL, COMPREHENSIVE Collection Time: 01/22/21 10:40 AM  
Result Value Ref Range Sodium 141 136 - 145 mmol/L Potassium 3.9 3.5 - 5.1 mmol/L Chloride 102 97 - 108 mmol/L  
 CO2 31 21 - 32 mmol/L Anion gap 8 5 - 15 mmol/L Glucose 186 (H) 65 - 100 mg/dL BUN 12 6 - 20 mg/dL Creatinine 1.04 (H) 0.55 - 1.02 mg/dL BUN/Creatinine ratio 12 12 - 20 GFR est AA >60 >60 ml/min/1.73m2 GFR est non-AA 51 (L) >60 ml/min/1.73m2 Calcium 9.4 8.5 - 10.1 mg/dL Bilirubin, total 0.8 0.2 - 1.0 mg/dL AST (SGOT) 22 15 - 37 U/L  
 ALT (SGPT) 26 12 - 78 U/L Alk. phosphatase 85 45 - 117 U/L Protein, total 8.0 6.4 - 8.2 g/dL Albumin 3.5 3.5 - 5.0 g/dL Globulin 4.5 (H) 2.0 - 4.0 g/dL A-G Ratio 0.8 (L) 1.1 - 2.2 BNP Collection Time: 01/22/21 10:40 AM  
Result Value Ref Range NT pro- <450 pg/mL PROCALCITONIN Collection Time: 01/22/21 10:40 AM  
Result Value Ref Range Procalcitonin <0.05 (H) 0 ng/mL TROPONIN I  
 Collection Time: 01/22/21 10:40 AM  
Result Value Ref Range Troponin-I, Qt. <0.05 <0.05 ng/mL SARS-COV-2 Collection Time: 01/22/21 11:15 AM  
Result Value Ref Range SARS-CoV-2 Nasopharyngeal    
COVID-19 RAPID TEST Collection Time: 01/22/21 11:15 AM  
Result Value Ref Range Specimen source Nasopharyngeal    
 COVID-19 rapid test DETECTED (A) Not Detected INFLUENZA A & B AG (RAPID TEST) Collection Time: 01/22/21 11:23 AM  
Result Value Ref Range Influenza A Antigen Negative Negative Influenza B Antigen Negative Negative Radiologic Studies -  
XR CHEST SNGL V Final Result Impression: The cardiomediastinal silhouette is appropriate for age, technique,  
and lung expansion. Pulmonary vasculature is not congested. The lungs are  
essentially clear. No effusion or pneumothorax is seen. CT Results  (Last 48 hours) None CXR Results  (Last 48 hours) 01/22/21 1223  XR CHEST SNGL V Final result Impression:  Impression: The cardiomediastinal silhouette is appropriate for age, technique,  
and lung expansion. Pulmonary vasculature is not congested. The lungs are  
essentially clear. No effusion or pneumothorax is seen. Narrative:  1 new comparison March 10 Medical Decision Making and ED Course I am the first provider for this patient. I reviewed the vital signs, available nursing notes, past medical history, past surgical history, family history and social history. Vital Signs-Reviewed the patient's vital signs. Patient Vitals for the past 12 hrs: 
 Temp Pulse Resp BP SpO2  
01/22/21 1041 99.7 °F (37.6 °C) 89 22 (!) 164/75 97 % EKG interpretation:  
 
 
 
Records Reviewed: Previous Hospital chart. EMS run report ED Course:  
Initial assessment performed. The patients presenting problems have been discussed, and they are in agreement with the care plan formulated and outlined with them.   I have encouraged them to ask questions as they arise throughout their visit. Orders Placed This Encounter  MECHANICAL PROPHYLAXIS IS CONTRAINDICATED Other, please document Already on Anticoagulation Already on Anticoagulation Standing Status:   Standing Number of Occurrences:   1 Order Specific Question:   Please Indicate Reason Answer: Other, please document  REASON FOR NOT SELECTING BASAL INSULIN Standing Status:   Standing Number of Occurrences:   1 Order Specific Question:   Reason for Not Selecting Basal Insulin Answer:   Concern for hypoglycemia  CULTURE, BLOOD, PAIRED Standing Status:   Standing Number of Occurrences:   1  INFLUENZA A & B AG (RAPID TEST) Standing Status:   Standing Number of Occurrences:   1  COVID-19 RAPID TEST Standing Status:   Standing Number of Occurrences:   1  XR CHEST SNGL V Standing Status:   Standing Number of Occurrences:   1 Order Specific Question:   Transport Answer:   BED [2] Order Specific Question:   Reason for Exam  
  Answer:   sob  URINALYSIS W/ REFLEX CULTURE Standing Status:   Standing Number of Occurrences:   1  
 SARS-COV-2 Admitted rapid Standing Status:   Standing Number of Occurrences:   1 Order Specific Question:   Specimen source Answer:   Nasopharyngeal [188] Order Specific Question:   Is this test for diagnosis or screening? Answer:   Diagnosis of ill patient Order Specific Question:   Symptomatic for COVID-19 as defined by CDC? Answer:   Yes Order Specific Question:   Date of Symptom Onset Answer:   1/21/2021 Order Specific Question:   Hospitalized for COVID-19? Answer:   No  
  Order Specific Question:   Admitted to ICU for COVID-19? Answer:   No  
  Order Specific Question:   Employed in healthcare setting? Answer:   No  
  Order Specific Question:   Resident in a congregate (group) care setting? Answer:   No  
  Order Specific Question:   Previously tested for COVID-19? Answer:   No  
  Order Specific Question:   Pregnant? Answer:   No  
 PROTHROMBIN TIME + INR Standing Status:   Standing Number of Occurrences:   1  
 PTT Standing Status:   Standing Number of Occurrences:   1  CBC WITH AUTOMATED DIFF Standing Status:   Standing Number of Occurrences:   1  
 LACTIC ACID Standing Status:   Standing Number of Occurrences:   1  MAGNESIUM Standing Status:   Standing Number of Occurrences:   1  METABOLIC PANEL, COMPREHENSIVE Standing Status:   Standing Number of Occurrences:   1  BNP Standing Status:   Standing Number of Occurrences:   1  PROCALCITONIN Standing Status:   Standing Number of Occurrences:   1  TROPONIN I Standing Status:   Standing Number of Occurrences:   1  METABOLIC PANEL, COMPREHENSIVE Standing Status:   Standing Number of Occurrences:   1  CBC WITH AUTOMATED DIFF Standing Status:   Standing Number of Occurrences:   1  
 HEMOGLOBIN A1C Standing Status:   Standing Number of Occurrences:   1  DIET REGULAR Standing Status:   Standing Number of Occurrences:   1  
 NOTIFY PROVIDER: SPECIFY Notify provider on pt's arrival to floor ONE TIME STAT Standing Status:   Standing Number of Occurrences:   1 Order Specific Question:   Please describe the test or procedure you would like to order. Answer:   Notify provider on pt's arrival to floor AlenDevang Parag 53 Per unit routine Standing Status:   Standing Number of Occurrences:   1  ACTIVITY AS TOLERATED W/ASSIST Standing Status:   Standing Number of Occurrences:   1  INTAKE AND OUTPUT Call for urine ouput less than 120ml in 4 hours Standing Status:   Standing   Number of Occurrences:   1  
 NURSING-MISCELLANEOUS: Palpate, scan or straight cath and document bladder residual as needed CONTINUOUS  
  Standing Status:   Standing  
  Number of Occurrences:   1  
  Order Specific Question:   Description of Order:  
  Answer:   Palpate, scan or straight cath and document bladder residual as needed  
• Cardiac Monitor  
  Standing Status:   Standing  
  Number of Occurrences:   1  
  Order Specific Question:   Type:  
  Answer:   Remote Telemetry  
  Order Specific Question:   Patient may go off unit with monitor  
  Answer:   No  
• NURSING-MISCELLANEOUS: Initiate hypoglycemic protocol if blood glucose is LESS THAN 70 mg/dL CONTINUOUS  
  Standing Status:   Standing  
  Number of Occurrences:   1  
  Order Specific Question:   Description of Order:  
  Answer:   Initiate hypoglycemic protocol if blood glucose is LESS THAN 70 mg/dL  
• NURSING-MISCELLANEOUS: FOR CONSCIOUS PATIENT: Administer 4 ounces fruit juice OR regular soda OR 4 glucose tablets. CONTINUOUS  
  Standing Status:   Standing  
  Number of Occurrences:   1  
  Order Specific Question:   Description of Order:  
  Answer:   FOR CONSCIOUS PATIENT: Administer 4 ounces fruit juice OR regular soda OR 4 glucose tablets.  
• NURSING-MISCELLANEOUS: If patient NPO, unconscious or unable to swallow and If Blood Glucose between 60 and 70 mg/dL: Follow instructions below If patient NPO, unconscious or unable to swallow and if blood glucose between 60 and 70 mg/dL: Give 25 ...  
  If patient NPO, unconscious or unable to swallow and if blood glucose between 60 and 70 mg/dL: Give 25 mL D50% IV.  If blood glucose LESS THAN 60 mg/dL: Give 50 mL D50% IV.  If no IV, administer glucagon 1 mg IM.  Repeat blood glucose in 15 minutes.  Continue to repeat blood glucose and treatment every 15 minutes until blood glucose is GREATER THAN 70 mg/dL and notify provider.  
  Standing Status:   Standing  
  Number of Occurrences:   1  
  Order Specific Question:   Description of Order:  
  Answer:   If patient NPO, unconscious or unable to swallow  and If Blood Glucose between 60 and 70 mg/dL: Follow instructions below  NURSING-MISCELLANEOUS: Repeat finger stick blood glucose in 15 minutes AFTER treatment, if LESS THAN 70 repeat hypoglycemic protocol and notify provider. CONTINUOUS Standing Status:   Standing Number of Occurrences:   1 Order Specific Question:   Description of Order: Answer:   Repeat finger stick blood glucose in 15 minutes AFTER treatment, if LESS THAN 70 repeat hypoglycemic protocol and notify provider.  NURSING-MISCELLANEOUS: Following Hypoglycemic Protocol: Once patient is fully alert and BG greater than 70 provide a small snack,  if NPO consider IV fluids with dextrose. CONTINUOUS Standing Status:   Standing Number of Occurrences:   1 Order Specific Question:   Description of Order: Answer: Following Hypoglycemic Protocol: Once patient is fully alert and BG greater than 70 provide a small snack,  if NPO consider IV fluids with dextrose.  NURSING-MISCELLANEOUS: Document all interventions in the Electronic Medical  Record (EMR). CONTINUOUS Standing Status:   Standing Number of Occurrences:   1 Order Specific Question:   Description of Order: Answer:   Document all interventions in the Electronic Medical  Record (EMR).  NURSING-MISCELLANEOUS: Blood glucose targets -- ICU: 140 - 180 mg/dL;  NON-ICU: 100 - 180 mg/dL CONTINUOUS Standing Status:   Standing Number of Occurrences:   1 Order Specific Question:   Description of Order: Answer:   Blood glucose targets -- ICU: 140 - 180 mg/dL;  NON-ICU: 100 - 180 mg/dL  FULL CODE Standing Status:   Standing Number of Occurrences:   1  
 IP CONSULT TO INFECTIOUS DISEASES Standing Status:   Standing Number of Occurrences:   1 Order Specific Question:   Reason for Consult: Answer:   COVID pneumonia Order Specific Question:   Did you call or speak to the consulting provider? Answer:    No  
 Order Specific Question:   Consult To Answer:   ID Order Specific Question:   Schedule When? Answer:   TODAY  EKG, 12 LEAD, INITIAL Standing Status:   Standing Number of Occurrences:   1 Order Specific Question:   Reason for Exam: Answer:   Mannyshannan Taylor  sodium chloride 0.9 % bolus infusion 1,000 mL  cefTRIAXone (ROCEPHIN) 1 g in sterile water (preservative free) 10 mL IV syringe Order Specific Question:   Antibiotic Indications Answer:   Pneumonia (CAP)  azithromycin (ZITHROMAX) 500 mg in 0.9% sodium chloride 250 mL (VIAL-MATE) Order Specific Question:   Antibiotic Indications Answer:   Pneumonia (CAP)  dexamethasone (PF) (DECADRON) 10 mg/mL injection 10 mg  
 dexamethasone (PF) (DECADRON) 10 mg/mL injection 10 mg  
 sodium chloride (NS) flush 5-40 mL  sodium chloride (NS) flush 5-40 mL  OR Linked Order Group  acetaminophen (TYLENOL) tablet 650 mg  
  acetaminophen (TYLENOL) suppository 650 mg  
 polyethylene glycol (MIRALAX) packet 17 g  OR Linked Order Group  promethazine (PHENERGAN) tablet 12.5 mg  
  ondansetron (ZOFRAN) injection 4 mg  enoxaparin (LOVENOX) injection 40 mg  
 famotidine (PF) (PEPCID) 20 mg in 0.9% sodium chloride 10 mL injection Order Specific Question:   H2RA INDICATION Answer:   SUP Prophylaxis  magnesium sulfate 1 g/100 ml IVPB (premix or compounded)  albuterol (PROVENTIL HFA, VENTOLIN HFA, PROAIR HFA) inhaler 1 Puff Order Specific Question:   MODE OF DELIVERY Answer:   [place]  azithromycin (ZITHROMAX) 500 mg in 0.9% sodium chloride 250 mL (VIAL-MATE) Order Specific Question:   Antibiotic Indications Answer:   Pneumonia (CAP) Order Specific Question:   CAP duration of therapy Answer:   5 days  glucose chewable tablet 16 g  
 dextrose (D50W) injection syrg 12.5-25 g  
 glucagon (GLUCAGEN) injection 1 mg  
 insulin lispro (HUMALOG) injection  IP CONSULT TO HOSPITALIST Standing Status:   Standing Number of Occurrences:   1 Order Specific Question:   Reason for Consult: Answer:   TO ADMIT Order Specific Question:   Did you call or speak to the consulting provider? Answer: Yes  IP CONSULT TO PULMONOLOGY Standing Status:   Standing Number of Occurrences:   1 Order Specific Question:   Reason for Consult: Answer:   Acute respiratory failure Order Specific Question:   Did you call or speak to the consulting provider? Answer:   No  
  Order Specific Question:   Consult To Answer:   Pulmonology Order Specific Question:   Schedule When? Answer:   TODAY  INITIAL PHYSICIAN ORDER: INPATIENT Remote Telemetry; 3. Patient receiving treatment that can only be provided in an inpatient setting (further clarification in H&P documentation) Standing Status:   Standing Number of Occurrences:   1 Order Specific Question:   Status: Answer:   [facility] Order Specific Question:   Type of Bed Answer:   Remote Telemetry [29] Order Specific Question:   Inpatient Hospitalization Certified Necessary for the Following Reasons Answer:   3. Patient receiving treatment that can only be provided in an inpatient setting (further clarification in H&P documentation) Order Specific Question:   Admitting Diagnosis Answer:   Acute respiratory failure due to COVID-19 [facility] [[id number]] Order Specific Question:   Admitting Physician Answer:   Jaki Ball [[id number]] Order Specific Question:   Attending Physician Answer:   Jaki Ball [[id number]] Order Specific Question:   Estimated Length of Stay Answer:   3-4 Midnights Order Specific Question:   Discharge Plan: Answer:   Home with Office Follow-up CONSULTANTS: 
Nalini jones Provider Notes (Medical Decision Making):  
78year-old with cough congestion shortness of breath and fever.   Concern for COVID-19 versus pneumonia versus bronchitis versus sepsis. Patient does have Covid pneumonia. Vitals are stable. Plan for admission as she is requiring oxygen Procedures Disposition Emergency Department Disposition:  Admitted Diagnosis Clinical Impression: 1. Pneumonia due to COVID-19 virus Attestations: 
 
Carmen Thomas MD 
 
Please note that this dictation was completed with [employer], the computer voice recognition software. Quite often unanticipated grammatical, syntax, homophones, and other interpretive errors are inadvertently transcribed by the computer software. Please disregard these errors. Please excuse any errors that have escaped final proofreading. Thank you.

## 2021-01-22 NOTE — ROUTINE PROCESS
TRANSFER - OUT REPORT: 
 
Verbal report given to Alphonso Boles RN (name) on [place] [id number]  being transferred to [facility](unit) for routine progression of care Report consisted of patients Situation, Background, Assessment and  
Recommendations(SBAR). Information from the following report(s) SBAR was reviewed with the receiving nurse. Lines:  
Peripheral IV 01/22/21 (Active) Opportunity for questions and clarification was provided. Patient transported with: 
 Portable Scores

## 2021-01-23 NOTE — PROGRESS NOTES
Reason for Admission: Acute Respiratory Failure due to COVID-19 RUR Score: 11% Plan for utilizing home health:       
 
PCP: First and Last name: Dr. Alvarado Delgado Name of Practice: [facility] [phone] Are you a current patient: Yes/No: yes Approximate date of last visit: 1/28/2021 Can you participate in a virtual visit with your PCP: comes to home Current Advanced Directive/Advance Care Plan: Per grandson he is Medical POA: Ricardo Tai [phone]. CM has requested a copy e-mailed or faxed. He indicated the patient does not have an advance medical directive. Transition of Care Plan: CM spoke with patient's grandson, who reports he is Stephanie Knutson. He stated the patient lives at home and her two daughters assist her in providing care. One of them is her paid caregiver through [employer] but they are requesting an updated UAI for more hours. CM will complete UAI. Bang stated the patient has a hospital bed, wheelchair, and haresh lift. He would like her to have [place] on discharge and will call CM back with "[employer] agency name they prefer. He has also requested an update from the physician and a cognitive assessment. DC plan: Home with Home Health (pending name of "[employer] agency) - also need updated UAI as well receipt of POA from bang. CM will continue to follow.

## 2021-01-23 NOTE — CONSULTS
Infectious Disease Consult Note Reason for Consult:  COVID-19 Date of Consultation: January 23, 2021 Date of Admission: 1/22/2021 Referring Physician: Hospitalist  
 
 
HPI: Sadiq 80-y.o BF who presented to the ED on 1/22 w c/o fever/dyspnea and was found to be positive for COVID-19 for which ID has been consulted. Per pt multiple fmly members tested positive for COVID-19 recenlty. 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 CXR on 1/22 revealed clear lungs. She has been afebrile and hemodynamically stable since admission, maintaining O2 sats on 1 L. Recent labs on 1/22 showed a normal WBC of 4.4, Cr 1.04, Procal <0.05, and lactic acid 0.9. She is on Decadron, and azithromycin. Pt seen by Dr. Torie Arteaga earlier today who added Tocilizumab and ivermectin. Review of Systems: 
 
 Gen: Generalized weakness, no fever/chills HEENT: Negative for headache, vision changes CV:  Negative for chest pain, dyspnea on exertion, leg edema Lungs:  shortness of breath, cough Abdomen: Negative for abdominal pain, nausea, vomiting, diarrhea, constipation Genitourinary: Negative for genital pain or genital discharge Neuro: extremity weakness. Negative for headache, numbness, tingling,  
 Skin: Negative for rash, sores/open wounds Musculoskeletal: Negative for joint pain, joint swelling, joint erythema Psych: Negative for manic behavior Past Medical History: 
Past Medical History:  
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  
 History of CVA (cerebrovascular accident) 10/5/2020  Morbid obesity ([address] 10/5/2020  Pure hypercholesterolemia 10/5/2020  Urinary incontinence 10/5/2020 Past surgical history No past surgical history on file. Social History Social History Tobacco Use  Smoking status: Former Smoker  Tobacco comment: QUIT 50 YEARS AGO Substance Use Topics  Alcohol use: Not on file  Drug use: Never Family history Family History Family history unknown: Yes Allergies: Allergies Allergen Reactions  Sulfa (Sulfonamide Antibiotics) Angioedema  Contrast Dye [Iodine] Itching Medications: No current facility-administered medications on file prior to encounter. Current Outpatient Medications on File Prior to Encounter Medication Sig Dispense Refill  loratadine (CLARITIN) 10 mg tablet TAKE 1 TABLET BY MOUTH DAILY. 30 Tab 5  
 atorvastatin (LIPITOR) 40 mg tablet TAKE ONE TABLET BY MOUTH AT BEDTIME. 90 Tab 1  clopidogreL (PLAVIX) 75 mg tab TAKE ONE TABLET BY MOUTH DAILY. 90 Tab 1  
 atenoloL (TENORMIN) 50 mg tablet TAKE 1 TABLET BY MOUTH TWICE DAILY 180 Tab 1  
 amLODIPine (NORVASC) 10 mg tablet TAKE ONE TABLET BY MOUTH DAILY. 90 Tab 1  
 omeprazole (PRILOSEC) 40 mg capsule TAKE 1 CAPSULE BY MOUTH ONCE DAILY 30 MINUTES BEFORE EATING. 90 Cap 1  
 losartan (COZAAR) 50 mg tablet Take 1 Tab by mouth daily.  hydrALAZINE (APRESOLINE) 25 mg tablet Take 1 Tab by mouth two (2) times a day.  furosemide (LASIX) 40 mg tablet Take 1 Tab by mouth daily.  glipiZIDE (GLUCOTROL) 10 mg tablet Take 10 mg by mouth two (2) times a day.  hydrALAZINE (APRESOLINE) 50 mg tablet Take 1 Tab by mouth two (2) times a day. Dose increased from 25 to 50 mg twice a day  Indications: high blood pressure 180 Tab 1 Physical Exam: 
 
Vitals:  
Patient Vitals for the past 24 hrs: 
 Temp Pulse Resp BP SpO2  
01/23/21 0900 98.1 °F (36.7 °C) 67 18 137/79 97 % 01/23/21 0802     98 % 01/23/21 0400  72     
01/23/21 0327 98.1 °F (36.7 °C) 76 20 (!) 152/80 95 % 01/23/21 0000  79     
01/22/21 2017     99 % 01/22/21 2008 98.2 °F (36.8 °C) 90 20 136/76 98 % 01/22/21 2000  [address]   
01/22/21 1600  75     
01/22/21 1500 98.1 °F (36.7 °C) 83 20 (!) 153/74 99 % ·  
· GEN: NAD, AAO x 4 
· HEENT: NCAT, PERRLA 
· HEART: S1, S2+, RRR, No murmur · Lungs: Mild exp wheezing b/l, no rhonchi or crackles · Abdomen: soft, ND, NT, +BS · Genitourinary: no genital discharge, no piper · Extremities: Trace edema, + pulses b/l · Skin: No chronic wounds or ulcers Labs:  
Recent Labs  
  01/22/21 
1040 WBC 4.4 HGB 12.3 HCT 37.5  Recent Labs  
  01/22/21 
1040 BUN 12  
CREA 1.04* Microbiology Data: - Blood Cx 01/22: Neg  
 
 
Imaging: CXR 01/22: The cardiomediastinal silhouette is appropriate for age, technique, 
and lung expansion. Pulmonary vasculature is not congested. The lungs are 
essentially clear. No effusion or pneumothorax is seen. 
  
Assessment / Plan:  
 
1. COVID-19 pneumonitis: Moderate disease Clear lungs on CXR, minimal O2 requirements Denies productive cough, Afebrile, normal WBC on routine labs Continue on Azithromycin and Decadron Agree w Ivermectin and Tocilizumab per pulmonary, will consider Remdesivir if worsening hypoxia 2. H/o COPD, mild exp wheeze on exam, not on home O2 
 
3. Acute hypoxia on presentation: Due to # 1, improved 4. B/l LE weakness from prior CVA, Wheel chair bound ar home 5. DM: Closely monitor BS while on steroid therapy 6. Other chronic problems per HPI Mani Leigh MD  
 
1/23/2021

## 2021-01-23 NOTE — PROGRESS NOTES
Consult for Vancomycin Dosing by Pharmacy by Dr. Nilesh Dey Consult provided for this 78y.o. year old female with positive COVID-19, for indication of bacteremia. Day of Therapy: 1 Goal of Level(s): 15-20mcg/dL Other Current Antibiotics: Azithromycin Significant Cultures:  
    Date                             Culture                                       Organism 1/22                              Blood x2                                     GPC Serum Creatinine Creatinine Date Value Ref Range Status 01/22/2021 1.04 (H) 0.55 - 1.02 mg/dL Final  
  
Creatinine Clearance Estimated Creatinine Clearance: 55 mL/min (A) (based on SCr of 1.04 mg/dL (H)). BUN Lab Results Component Value Date/Time BUN 12 01/22/2021 10:40 AM  
  
WBC Lab Results Component Value Date/Time WBC 4.4 01/22/2021 10:40 AM  
  
Temp 98.1 °F (36.7 °C) Ht Readings from Last 1 Encounters:  
01/22/21 162.6 cm (64\") Wt Readings from Last 1 Encounters:  
01/22/21 127 kg (280 lb) Ideal body weight: 54.7 kg (120 lb 9.5 oz) Adjusted ideal body weight: 83.6 kg (184 lb 5.7 oz) New Regimen:  
Start Vancomycin therapy 1250mg IV q24h. Pharmacy to follow daily and will make changes to dose and/or frequency based on clinical status. _________________________________ Pharmacist Yesi Lawler

## 2021-01-23 NOTE — CONSULTS
Consult Pulmonary, Critical Care Name: Anca Sierra MRN: [id number] : 1940 Hospital: [address] [place] [address] Date: 2021  Admission date: 2021 Hospital Day: 2 Subjective/Interval History:  
Seen earlier today on rounds. Pt is unstable and acutely ill. Medical records and data reviewed. Hospital Problems  Date Reviewed: 10/6/2020 Codes Class Noted POA Acute respiratory failure due to COVID-19 [facility]) ICD-10-CM: U07.1, J96.00 
ICD-9-CM: 518.81, 079.89  2021 Unknown IMPRESSION:  
1. Acute hypoxic respiratory failure 2. COVID-19 pneumonitis 3. COVID-19 infection 4. Nausea vomiting probable Covid gastroenteritis 5. COPD 6. Left hemiplegia from previous strokes 7. History CHF 8. Body mass index is 48.06 kg/m². 9. Multiorgan dysfunction as outlined above: Pt has one or more acute or chronic illnesses with severe RECOMMENDATIONS/PLAN:  
1. Follow oxygen saturation and adjust supplemental oxygen as necessary 2. We will give Decadron 4 mg IV every 6 hours 3. We will give 2 doses Actemra 4. We will give 1 dose of ivermectin 5. We will give Zosyn instead of Rocephin in case she did have aspiration from vomiting 6. We will hold remdesivir unless there is worsening hypoxia 7. We will give albuterol and Symbicort inhalers for underlying COPD 
8. [x] High complexity decision making was performed [x] See my orders for details Subjective/Initial History:  
 
I was asked by Pritesh Gil MD to see Anca Sierra  a 78 y.o.  female in consultation for a chief complaint of bilateral pneumonia COVID-19 infection acute hypoxic respiratory failure Allergies Allergen Reactions  Sulfa (Sulfonamide Antibiotics) Angioedema  Contrast Dye [Iodine] Itching MAR reviewed and pertinent medications noted or modified as needed Current Facility-Administered Medications Medication  dexamethasone (PF) (DECADRON) 10 mg/mL injection 4 mg  ivermectin (STROMECTOL) tablet 12 mg  tocilizumab (ACTEMRA) 400 mg in 0.9% sodium chloride 100 mL infusion  sodium chloride (NS) flush 5-40 mL  sodium chloride (NS) flush 5-40 mL  acetaminophen (TYLENOL) tablet 650 mg Or  acetaminophen (TYLENOL) suppository 650 mg  
 polyethylene glycol (MIRALAX) packet 17 g  promethazine (PHENERGAN) tablet 12.5 mg Or  
 ondansetron (ZOFRAN) injection 4 mg  enoxaparin (LOVENOX) injection 40 mg  
 famotidine (PF) (PEPCID) 20 mg in 0.9% sodium chloride 10 mL injection  albuterol (PROVENTIL HFA, VENTOLIN HFA, PROAIR HFA) inhaler 1 Puff  azithromycin (ZITHROMAX) 500 mg in 0.9% sodium chloride 250 mL (VIAL-MATE)  glucose chewable tablet 16 g  
 dextrose (D50W) injection syrg 12.5-25 g  
 glucagon (GLUCAGEN) injection 1 mg  
 insulin lispro (HUMALOG) injection Patient PCP: Misbah Redding MD 
PMH:  has a past medical history of Arthritis, Congestive heart failure ([facility] Dependence on wheelchair (10/5/2020), DM (diabetes mellitus), type 2 ([address] (10/5/2020), Essential hypertension (10/5/2020), History of CVA (cerebrovascular accident) (10/5/2020), Morbid obesity ([address] (10/5/2020), Pure hypercholesterolemia (10/5/2020), and Urinary incontinence (10/5/2020). She also has no past medical history of History of abuse in adulthood or History of abuse in childhood. PSH:   has no past surgical history on file. FHX: Family history is unknown by patient. SHX:  reports that she has quit smoking. She does not have any smokeless tobacco history on file. She reports that she does not use drugs. Systemic review: 
General she states her appetite has been poor recently does not notice any weight loss Eyes no double vision or momentary blindness ENT no facial pain over the sinuses Endocrinologic no polyuria polydipsia Musculoskeletal she has had weakness and muscle aches and pains with this illness for perhaps a week but again she is a poor historian Neurologic history of previous strokes she thought it was last July but in talking with the daughter it was 2019 with left hemiplegia Gastrointestinal she did have nausea and vomiting yesterday several times no chronic nausea vomiting or diarrhea Genitourinary denies any pain or discomfort on urination or incontinence Cardiovascular has chest pain diaphoresis or ankle edema Respiratory states she was told she has COPD and is on a nebulizer and inhaler at home she takes the nebulizer 3 times a day but does not know what it is Objective:  
 
Vital Signs: Telemetry:    normal sinus rhythm Intake/Output:  
Visit Vitals /79 Pulse 67 Temp 98.1 °F (36.7 °C) Resp 18 Ht 5' 4\" (1.626 m) Wt 127 kg (280 lb) SpO2 97% BMI 48.06 kg/m² Temp (24hrs), Av.1 °F (36.7 °C), Min:98.1 °F (36.7 °C), Max:98.2 °F (36.8 °C) O2 Device: Nasal cannula O2 Flow Rate (L/min): 1 l/min Wt Readings from Last 4 Encounters:  
21 127 kg (280 lb) No intake or output data in the 24 hours ending 21 1138 Last shift:      No intake/output data recorded. Last 3 shifts: No intake/output data recorded. Physical Exam:  
General:  female; in no apparent distress; wearing nasal oxygen 1 L 
HEENT: NCAT, oral mucosa normal 
Eyes: anicteric; conjunctiva clear extraocular movements intact Neck: no nodes, obesity obscures determination of JVD no accessory muscle use Chest: no deformity,  
Cardiac: Regular rate and rhythm no murmur no ankle edema Lungs: Shallow breaths no wheezing no definite rales but very diminished breath sounds in the bases Abd: Obese soft positive bowel sounds no tenderness Ext: no edema; no joint swelling; No clubbing : clear urine Neuro: Awake alert oriented to person and location very mildly confused probably from previous strokes has left hemiplegia moves the right side Psych- no agitation, oriented to person; mild cognitive dysfunction is not able to give a straightforward history Skin: warm, dry, no cyanosis; 
Pulses: Brachial radial femoral pulses intact Capillary: Normal capillary refill Labs: 
 
Recent Labs  
  01/22/21 
1040 WBC 4.4 HGB 12.3  INR 1.0 APTT 30.6 Recent Labs  
  01/22/21 
1040   
K 3.9  CO2 31 * BUN 12  
CREA 1.04* CA 9.4 MG 1.6 LAC 0.9 ALB 3.5 ALT 26  
 
1 L nasal oxygen with oxygen saturation 97% Room air oxygen saturation 94% Daughter gives history of room air oxygen saturation of 88% when rescue squad arrived Recent Labs  
  01/22/21 
1040 TROIQ <0.05  Procalcitonin less than 0.05 Nasal influenza smear negative COVID-19 antigen positive Lab Results Component Value Date/Time Culture result: No growth after 21 hours 01/22/2021 11:45 AM  
No results found for: TSH, TSHEXT Imaging: CXR Results  (Last 48 hours) 01/22/21 1223  XR CHEST SNGL V Final result Impression:  Impression: The cardiomediastinal silhouette is appropriate for age, technique,  
and lung expansion. Pulmonary vasculature is not congested. The lungs are  
essentially clear. No effusion or pneumothorax is seen. I disagree with above there is infiltrate in the left mid and left lower lung and probable the medial right base Narrative:  1 new comparison March 10 [facility] [place] [facility] Encounter encounter on 01/22/21 XR CHEST SNGL V  
 Narrative 1 new comparison March 10 Impression Impression: The cardiomediastinal silhouette is appropriate for age, technique, 
and lung expansion. Pulmonary vasculature is not congested. The lungs are 
essentially clear. No effusion or pneumothorax is seen. · Discussion: Patient is COVID-19 antigen positive with hypoxia and changes on chest x-ray. Will give Decadron and Actemra and 1 dose ivermectin.   If there is progressive hypoxia will add Remdesivir · Discussion with nurse and daughter · Time of care 45 minutes Charles Christie MD

## 2021-01-23 NOTE — PROGRESS NOTES
Hospitalist Progress Note Daily Progress Note: 1/23/2021 Subjective: The patient is seen for follow  up.  
66-year-old female with history of hypertension, diabetes, hyperlipidemia was admitted to the hospital with a complaint of flulike symptoms were found to have Covid positive on screen. Patient is saturating well on1 L oxygen by nasal cannula. Difficult IV access. Medications reviewed Current Facility-Administered Medications Medication Dose Route Frequency  [START ON 1/24/2021] amLODIPine (NORVASC) tablet 5 mg  5 mg Oral DAILY  [START ON 1/24/2021] atenoloL (TENORMIN) tablet 50 mg  50 mg Oral DAILY  atorvastatin (LIPITOR) tablet 40 mg  40 mg Oral QHS  [START ON 1/24/2021] clopidogreL (PLAVIX) tablet 75 mg  75 mg Oral DAILY  [START ON 1/24/2021] furosemide (LASIX) tablet 40 mg  40 mg Oral DAILY  glipiZIDE (GLUCOTROL) tablet 10 mg  10 mg Oral BID  hydrALAZINE (APRESOLINE) tablet 25 mg  25 mg Oral BID  hydrALAZINE (APRESOLINE) tablet 50 mg  50 mg Oral BID  [START ON 1/24/2021] loratadine (CLARITIN) tablet 10 mg  10 mg Oral DAILY  [START ON 1/24/2021] losartan (COZAAR) tablet 50 mg  50 mg Oral DAILY  dexamethasone (PF) (DECADRON) 10 mg/mL injection 4 mg  4 mg IntraVENous Q6H  
 ivermectin (STROMECTOL) tablet 12 mg  12 mg Oral ONCE  
 tocilizumab 400 mg in 0.9% sodium chloride 100 mL infusion  400 mg IntraVENous DAILY  albuterol (PROVENTIL HFA, VENTOLIN HFA, PROAIR HFA) inhaler 2 Puff  2 Puff Inhalation Q6HWA RT  
 budesonide-formoteroL (SYMBICORT) 160-4.5 mcg/actuation HFA inhaler 2 Puff  2 Puff Inhalation BID RT  
 insulin glargine (LANTUS) injection 30 Units  30 Units SubCUTAneous QHS  sodium chloride (NS) flush 5-40 mL  5-40 mL IntraVENous Q8H  
 sodium chloride (NS) flush 5-40 mL  5-40 mL IntraVENous PRN  
 acetaminophen (TYLENOL) tablet 650 mg  650 mg Oral Q6H PRN  Or  
 acetaminophen (TYLENOL) suppository 650 mg  650 mg Rectal Q6H PRN  
 polyethylene glycol (MIRALAX) packet 17 g  17 g Oral DAILY PRN  promethazine (PHENERGAN) tablet 12.5 mg  12.5 mg Oral Q6H PRN Or  
 ondansetron (ZOFRAN) injection 4 mg  4 mg IntraVENous Q6H PRN  
 enoxaparin (LOVENOX) injection 40 mg  40 mg SubCUTAneous DAILY  famotidine (PF) (PEPCID) 20 mg in 0.9% sodium chloride 10 mL injection  20 mg IntraVENous BID  albuterol (PROVENTIL HFA, VENTOLIN HFA, PROAIR HFA) inhaler 1 Puff  1 Puff Inhalation Q6H RT  
 azithromycin (ZITHROMAX) 500 mg in 0.9% sodium chloride 250 mL (VIAL-MATE)  500 mg IntraVENous Q24H  
 glucose chewable tablet 16 g  4 Tab Oral PRN  
 dextrose (D50W) injection syrg 12.5-25 g  25-50 mL IntraVENous PRN  
 glucagon (GLUCAGEN) injection 1 mg  1 mg IntraMUSCular PRN  
 insulin lispro (HUMALOG) injection   SubCUTAneous AC&HS Review of Systems: A comprehensive review of systems was negative except for that written in the HPI. Objective:  
Physical Exam:  
 
Visit Vitals /79 Pulse 67 Temp 98.1 °F (36.7 °C) Resp 18 Ht 5' 4\" (1.626 m) Wt 127 kg (280 lb) SpO2 97% BMI 48.06 kg/m² O2 Flow Rate (L/min): 1 l/min O2 Device: Nasal cannula Temp (24hrs), Av.1 °F (36.7 °C), Min:98.1 °F (36.7 °C), Max:98.2 °F (36.8 °C) No intake/output data recorded. No intake/output data recorded. PHYSICAL EXAM: 
General: Alert awake oriented, morbidly obese Skin: Extremities and face reveal no rashes. HEENT: Sclerae anicteric. Extra-occular muscles are intact. No oral ulcers. No ENT discharge. The neck is supple. Cardiovascular: Regular rate and rhythm. No murmurs, gallops, or rubs. PMI nondisplaced. Carotids without bruits. Respiratory: Comfortable breathing with no accessory muscle use. Clear breath sounds with no wheezes, rales, or rhonchi. GI: Abdomen nondistended, soft, and nontender. Normal active bowel sounds. No enlargement of the liver or spleen. No masses palpable.   
Rectal: Deferred Musculoskeletal: No pitting edema of the lower legs. Extremities have good range of motion. No costovertebral tenderness. Neurological: Gross memory appears intact. Patient is alert and oriented. Power 5/5, Cranial nerves II-XII Psychiatric: Mood appears appropriate with judgement intact. Lymphatic: No cervical or supraclavicular adenopathy. Data Review:  
   
Recent Days: 
Recent Labs  
  01/22/21 
1040 WBC 4.4 HGB 12.3 HCT 37.5  Recent Labs  
  01/22/21 
1040   
K 3.9  CO2 31 * BUN 12  
CREA 1.04* CA 9.4 MG 1.6 ALB 3.5 TBILI 0.8 ALT 26 INR 1.0 No results for input(s): PH, PCO2, PO2, HCO3, FIO2 in the last 72 hours. XR CHEST SNGL V Final Result Impression: The cardiomediastinal silhouette is appropriate for age, technique,  
and lung expansion. Pulmonary vasculature is not congested. The lungs are  
essentially clear. No effusion or pneumothorax is seen. Assessment/  
 
Problem List: 
Hospital Problems  Date Reviewed: 10/6/2020 Codes Class Noted POA Acute respiratory failure due to COVID-19 [facility]) ICD-10-CM: U07.1, J96.00 
ICD-9-CM: 518.81, 079.89  1/22/2021 Unknown History of CVA (cerebrovascular accident) ICD-10-CM: Z86.73 
ICD-9-CM: V12.54  10/5/2020 Yes Essential hypertension ICD-10-CM: I10 
ICD-9-CM: 401.9  10/5/2020 Yes Morbid obesity ([facility] [address] ICD-10-CM: E66.01 
ICD-9-CM: 278.01  10/5/2020 Yes DM (diabetes mellitus), type 2 ([facility] [address] [facility] ICD-10-CM: E11.9 ICD-9-CM: 250.00  10/5/2020 Yes Plan: 
78-year-old female came to the hospital with a complaint of shortness of breath and was found to have Covid pneumonia 1. Acute respiratory failure due to Covid pneumonia: Continue patient on dexamethasone, Zithromax- switch to PO, patient was given 1 dose of ivermectin and also started on Actemra by ID and pulmonology.   ID and pulmonary evaluation of the patient is appreciated. 2.  Diabetes mellitus:Patient's blood sugars are elevated. Patient has not started on her home medications as ordered were signed and held for now. Release the orders and will start the patient on her home medications along with sliding scale coverage. Will adjust medications based on her clinical response. Hold off on Lantus for now. 3.  Morbid obesity: Supportive care 4. Hypertension: Patient's home medications will be resumed we 5. Culture positive: Patient has 2 out of 4 bottles positive for gram-positive cocci. Will add IV vancomycin and follow-up with infectious disease for further recommendations as well. Patient is afebrile. Care Plan discussed with: Patient/Family and Nurse Anastasia Topete MD

## 2021-01-24 NOTE — PROGRESS NOTES
Consult Pulmonary, Critical Care Name: Margy Jacinto MRN: [id number] : 1940 Hospital: [address] [place] [address] Date: 2021  Admission date: 2021 Hospital Day: 3 Subjective/Interval History:  
Feels better today no specific complaints Hospital Problems  Date Reviewed: 10/6/2020 Codes Class Noted POA Acute respiratory failure due to COVID-19 [facility]) ICD-10-CM: U07.1, J96.00 
ICD-9-CM: 518.81, 079.89  2021 Unknown History of CVA (cerebrovascular accident) ICD-10-CM: Z86.73 
ICD-9-CM: V12.54  10/5/2020 Yes Essential hypertension ICD-10-CM: I10 
ICD-9-CM: 401.9  10/5/2020 Yes Morbid obesity ([facility] [address] ICD-10-CM: E66.01 
ICD-9-CM: 278.01  10/5/2020 Yes DM (diabetes mellitus), type 2 ([facility] [address] ICD-10-CM: E11.9 ICD-9-CM: 250.00  10/5/2020 Yes IMPRESSION:  
1. Acute hypoxic respiratory failure remains on nasal oxygen 1 L 
2. COVID-19 pneumonitis 3. COVID-19 infection 4. Nausea vomiting probable Covid gastroenteritis 5. COPD 6. Left hemiplegia from previous strokes 7. History CHF Body mass index is 48.06 kg/m². 8.   
  
RECOMMENDATIONS/PLAN:  
1. Follow oxygen saturation and adjust supplemental oxygen as necessary currently at 1 L 
2. No IV access earlier Decadron changed to oral 
3. We will give 2 doses Actemra once IV established 4. We will give 1 dose of ivermectin 5. We will give Zosyn instead of Rocephin in case she did have aspiration from vomiting 6. We will hold remdesivir unless there is worsening hypoxia 7. We will give albuterol and Symbicort inhalers for underlying COPD 
8. [x] High complexity decision making was performed [x] See my orders for details Subjective/Initial History:  
 
I was asked by Maurilio Candelaria MD to see Margy Jacinto  a 78 y.o.  female in consultation for a chief complaint of bilateral pneumonia COVID-19 infection acute hypoxic respiratory failure Allergies Allergen Reactions  Sulfa (Sulfonamide Antibiotics) Angioedema  Contrast Dye [Iodine] Itching MAR reviewed and pertinent medications noted or modified as needed Current Facility-Administered Medications Medication  azithromycin (ZITHROMAX) tablet 500 mg  
 dexAMETHasone (DECADRON) tablet 6 mg  
 amLODIPine (NORVASC) tablet 5 mg  atenoloL (TENORMIN) tablet 50 mg  
 atorvastatin (LIPITOR) tablet 40 mg  
 clopidogreL (PLAVIX) tablet 75 mg  furosemide (LASIX) tablet 40 mg  
 glipiZIDE (GLUCOTROL) tablet 10 mg  
 hydrALAZINE (APRESOLINE) tablet 25 mg  
 hydrALAZINE (APRESOLINE) tablet 50 mg  loratadine (CLARITIN) tablet 10 mg  
 losartan (COZAAR) tablet 50 mg  
 tocilizumab 400 mg in 0.9% sodium chloride 100 mL infusion  albuterol (PROVENTIL HFA, VENTOLIN HFA, PROAIR HFA) inhaler 2 Puff  budesonide-formoteroL (SYMBICORT) 160-4.5 mcg/actuation HFA inhaler 2 Puff  VANCOMYCIN INFORMATION NOTE  vancomycin (VANCOCIN) 1,250 mg in 0.9% sodium chloride 250 mL (VIAL-MATE)  sodium chloride (NS) flush 5-40 mL  sodium chloride (NS) flush 5-40 mL  acetaminophen (TYLENOL) tablet 650 mg Or  acetaminophen (TYLENOL) suppository 650 mg  
 polyethylene glycol (MIRALAX) packet 17 g  promethazine (PHENERGAN) tablet 12.5 mg Or  
 ondansetron (ZOFRAN) injection 4 mg  enoxaparin (LOVENOX) injection 40 mg  
 famotidine (PF) (PEPCID) 20 mg in 0.9% sodium chloride 10 mL injection  albuterol (PROVENTIL HFA, VENTOLIN HFA, PROAIR HFA) inhaler 1 Puff  glucose chewable tablet 16 g  
 dextrose (D50W) injection syrg 12.5-25 g  
 glucagon (GLUCAGEN) injection 1 mg  
 insulin lispro (HUMALOG) injection Patient PCP: Debora Church MD 
[facility]:  has a past medical history of Arthritis, Congestive heart failure (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 [address] (10/5/2020), Essential hypertension (10/5/2020), History of CVA (cerebrovascular accident) (10/5/2020), Morbid obesity ([address] (10/5/2020), Pure hypercholesterolemia (10/5/2020), and Urinary incontinence (10/5/2020). She also has no past medical history of History of abuse in adulthood or History of abuse in childhood. PSH:   has no past surgical history on file. FHX: Family history is unknown by patient. SHX:  reports that she has quit smoking. She does not have any smokeless tobacco history on file. She reports that she does not use drugs. Systemic review: 
General she states her appetite has been poor recently does not notice any weight loss Eyes no double vision or momentary blindness ENT no facial pain over the sinuses Endocrinologic no polyuria polydipsia Musculoskeletal she has had weakness and muscle aches and pains with this illness for perhaps a week but again she is a poor historian Neurologic history of previous strokes she thought it was last July but in talking with the daughter it was  with left hemiplegia Gastrointestinal she did have nausea and vomiting yesterday several times no chronic nausea vomiting or diarrhea Genitourinary denies any pain or discomfort on urination or incontinence Cardiovascular has chest pain diaphoresis or ankle edema Respiratory states she was told she has COPD and is on a nebulizer and inhaler at home she takes the nebulizer 3 times a day but does not know what it is Objective:  
 
Vital Signs: Telemetry:    normal sinus rhythm Intake/Output:  
Visit Vitals /75 Pulse 78 Temp 97.3 °F (36.3 °C) Resp 18 Ht 5' 4\" (1.626 m) Wt 127 kg (280 lb) SpO2 95% BMI 48.06 kg/m² Temp (24hrs), Av.7 °F (36.5 °C), Min:97.3 °F (36.3 °C), Max:97.9 °F (36.6 °C) O2 Device: Nasal cannula O2 Flow Rate (L/min): 1 l/min Wt Readings from Last 4 Encounters:  
21 127 kg (280 lb) No intake or output data in the 24 hours ending 21 1525 Last shift:      No intake/output data recorded. Last 3 shifts: No intake/output data recorded. Physical Exam:  
General:  female; in no apparent distress; wearing nasal oxygen 1 L 
HEENT: NCAT, oral mucosa normal 
Eyes: anicteric; conjunctiva clear extraocular movements intact Neck: no nodes, obesity obscures determination of JVD no accessory muscle use Chest: no deformity,  
Cardiac: Regular rate and rhythm no murmur no ankle edema Lungs: Shallow breaths no wheezing no definite rales but very diminished breath sounds in the bases Abd: Obese soft positive bowel sounds no tenderness Ext: no edema; no joint swelling; No clubbing : clear urine Neuro: Awake alert oriented to person and location very mildly confused probably from previous strokes has left hemiplegia moves the right side Psych- no agitation, oriented to person; mild cognitive dysfunction is not able to give a straightforward history Skin: warm, dry, no cyanosis; 
Pulses: Brachial radial femoral pulses intact Capillary: Normal capillary refill Labs: 
 
Recent Labs  
  01/23/21 
1717 01/22/21 
1040 WBC 3.6 4.4 HGB 11.8 12.3  186 INR  --  1.0 APTT  --  30.6 Recent Labs  
  01/23/21 
1717 01/22/21 
1040  141  
K 4.7 3.9  102 CO2 30 31 * 186* BUN 30* 12 CREA 1.61* 1.04* CA 8.9 9.4 MG  --  1.6 LAC  --  0.9 ALB 3.2* 3.5 ALT 22 26  
 
1 L nasal oxygen with oxygen saturation 97% Room air oxygen saturation 94% Daughter gives history of room air oxygen saturation of 88% when rescue squad arrived Recent Labs  
  01/22/21 
1040 TROIQ <0.05  Procalcitonin less than 0.05 Nasal influenza smear negative COVID-19 antigen positive Lab Results Component Value Date/Time Culture result:  01/22/2021 11:45 AM  
  Two of two bottles have been flagged positive by instrument. Bottles have been sent to [facility] [place] laboratory to assess for possible growth.   
 Culture result:  01/22/2021 11:45 AM  
  Gram pos cocci in clusters Byron Hylton via Ms. Monique Valero on 1.23.21 at 15:25 by tlw No results found for: TSH, TSHEXT, TSHEXT Imaging: CXR Results  (Last 48 hours) 01/22/21 1223  XR CHEST SNGL V Final result Impression:  Impression: The cardiomediastinal silhouette is appropriate for age, technique,  
and lung expansion. Pulmonary vasculature is not congested. The lungs are  
essentially clear. No effusion or pneumothorax is seen. I disagree with above there is infiltrate in the left mid and left lower lung and probable the medial right base Narrative:  1 new comparison March 10 Results from [facility] Encounter encounter on 01/22/21 XR CHEST SNGL V  
 Narrative 1 new comparison March 10 Impression Impression: The cardiomediastinal silhouette is appropriate for age, technique, 
and lung expansion. Pulmonary vasculature is not congested. The lungs are 
essentially clear. No effusion or pneumothorax is seen. · Discussion: Patient is COVID-19 antigen positive with hypoxia and changes on chest x-ray. Will give Decadron and Actemra and 1 dose ivermectin. If there is progressive hypoxia will add Remdesivir · Discussion with nurse and daughter · Ada Mariscal MD

## 2021-01-24 NOTE — PROGRESS NOTES
Infectious Disease Progress Note Subjective:  
Pt seen and examined at bedside. States she feels better. Denies new complaints. No acute events since last seen Objective:  
Physical Exam:  
 
Visit Vitals /75 Pulse 78 Temp 97.3 °F (36.3 °C) Resp 18 Ht 5' 4\" (1.626 m) Wt 280 lb (127 kg) SpO2 95% BMI 48.06 kg/m² O2 Flow Rate (L/min): 1 l/min O2 Device: Nasal cannula Temp (24hrs), Av.7 °F (36.5 °C), Min:97.3 °F (36.3 °C), Max:97.9 °F (36.6 °C) No intake/output data recorded. No intake/output data recorded. General: NAD, alert, AAO x x 4 HEENT: NAILA, Moist mucosa Lungs: Decreased exp wheeze, no rhonchi Heart: S1S2+, RRR, no murmur Abdo: Soft, NT, ND, +BS Exts: No edema, + pulses b/l  
Skin: No wounds, No rashes or lesions Data Review:  
   
Recent Days: 
Recent Labs  
  21 
1717 21 
1040 WBC 3.6 4.4 HGB 11.8 12.3 HCT 36.0 37.5  186 Recent Labs  
  21 
1717 21 
1040 BUN 30* 12 CREA 1.61* 1.04* Microbiology Blood Cx : GPC in clusters Diagnostics CXR Results  (Last 48 hours) None Assessment/Plan 1. COVID-19 pneumonitis: Moderate disease Maintaining O2 sats on 1L, denies productive cough Continue on Steroid therapy. S/p Ivermectin 12 mg on  Monitor for improvement in respiratory status 2. H/o COPD, Improved BS b/l on exam  
  
3. Acute hypoxia on presentation: Due to # 1, improved  
  
4. GPC in clusters bacteremia: Likely skin contaminant No need for directed antimicrobial therapy, Vanc discontinued  
  
5. DM: Closely monitor BS while on steroid therapy Don Reza MD 
 
2021

## 2021-01-24 NOTE — PROGRESS NOTES
Hospitalist Progress Note Daily Progress Note: 1/24/2021 Subjective: The patient is seen for follow  up.  
78-year-old female with history of hypertension, diabetes, hyperlipidemia was admitted to the hospital with a complaint of flulike symptoms were found to have Covid positive on screen. Patient is saturating well on1 L oxygen by nasal cannula. Difficult IV access. We were able to get IJ iv today. She is slightly confused Medications reviewed Current Facility-Administered Medications Medication Dose Route Frequency  azithromycin (ZITHROMAX) tablet 500 mg  500 mg Oral DAILY  dexAMETHasone (DECADRON) tablet 6 mg  6 mg Oral Q12H  
 amLODIPine (NORVASC) tablet 5 mg  5 mg Oral DAILY  atenoloL (TENORMIN) tablet 50 mg  50 mg Oral DAILY  atorvastatin (LIPITOR) tablet 40 mg  40 mg Oral QHS  clopidogreL (PLAVIX) tablet 75 mg  75 mg Oral DAILY  furosemide (LASIX) tablet 40 mg  40 mg Oral DAILY  glipiZIDE (GLUCOTROL) tablet 10 mg  10 mg Oral BID  hydrALAZINE (APRESOLINE) tablet 25 mg  25 mg Oral BID  hydrALAZINE (APRESOLINE) tablet 50 mg  50 mg Oral BID  loratadine (CLARITIN) tablet 10 mg  10 mg Oral DAILY  losartan (COZAAR) tablet 50 mg  50 mg Oral DAILY  tocilizumab 400 mg in 0.9% sodium chloride 100 mL infusion  400 mg IntraVENous DAILY  albuterol (PROVENTIL HFA, VENTOLIN HFA, PROAIR HFA) inhaler 2 Puff  2 Puff Inhalation Q6HWA RT  
 budesonide-formoteroL (SYMBICORT) 160-4.5 mcg/actuation HFA inhaler 2 Puff  2 Puff Inhalation BID RT  
 VANCOMYCIN INFORMATION NOTE   Other Rx Dosing/Monitoring  vancomycin (VANCOCIN) 1,250 mg in 0.9% sodium chloride 250 mL (VIAL-MATE)  1,250 mg IntraVENous Q24H  
 sodium chloride (NS) flush 5-40 mL  5-40 mL IntraVENous Q8H  
 sodium chloride (NS) flush 5-40 mL  5-40 mL IntraVENous PRN  
 acetaminophen (TYLENOL) tablet 650 mg  650 mg Oral Q6H PRN  Or  
 acetaminophen (TYLENOL) suppository 650 mg  650 mg Rectal Q6H PRN  polyethylene glycol (MIRALAX) packet 17 g  17 g Oral DAILY PRN  promethazine (PHENERGAN) tablet 12.5 mg  12.5 mg Oral Q6H PRN Or  
 ondansetron (ZOFRAN) injection 4 mg  4 mg IntraVENous Q6H PRN  
 enoxaparin (LOVENOX) injection 40 mg  40 mg SubCUTAneous DAILY  famotidine (PF) (PEPCID) 20 mg in 0.9% sodium chloride 10 mL injection  20 mg IntraVENous BID  albuterol (PROVENTIL HFA, VENTOLIN HFA, PROAIR HFA) inhaler 1 Puff  1 Puff Inhalation Q6H RT  
 glucose chewable tablet 16 g  4 Tab Oral PRN  
 dextrose (D50W) injection syrg 12.5-25 g  25-50 mL IntraVENous PRN  
 glucagon (GLUCAGEN) injection 1 mg  1 mg IntraMUSCular PRN  
 insulin lispro (HUMALOG) injection   SubCUTAneous AC&HS Review of Systems: A comprehensive review of systems was negative except for that written in the HPI. Objective:  
Physical Exam:  
 
Visit Vitals /75 Pulse 78 Temp 97.3 °F (36.3 °C) Resp 18 Ht 5' 4\" (1.626 m) Wt 127 kg (280 lb) SpO2 95% BMI 48.06 kg/m² O2 Flow Rate (L/min): 1 l/min O2 Device: Nasal cannula Temp (24hrs), Av.7 °F (36.5 °C), Min:97.3 °F (36.3 °C), Max:97.9 °F (36.6 °C) No intake/output data recorded. No intake/output data recorded. PHYSICAL EXAM: 
General: Alert awake oriented, morbidly obese Skin: Extremities and face reveal no rashes. HEENT: Sclerae anicteric. Extra-occular muscles are intact. No oral ulcers. No ENT discharge. The neck is supple. Cardiovascular: Regular rate and rhythm. No murmurs, gallops, or rubs. PMI nondisplaced. Carotids without bruits. Respiratory: Comfortable breathing with no accessory muscle use. Clear breath sounds with no wheezes, rales, or rhonchi. GI: Abdomen nondistended, soft, and nontender. Normal active bowel sounds. No enlargement of the liver or spleen. No masses palpable. Rectal: Deferred Musculoskeletal: No pitting edema of the lower legs. Extremities have good range of motion.  No costovertebral tenderness. Neurological: Gross memory appears intact. Patient is alert and oriented. Power 5/5, Cranial nerves II-XII. Starts talking about family and other things and has to be reoriented Psychiatric: Mood appears appropriate with judgement intact. Lymphatic: No cervical or supraclavicular adenopathy. Data Review:  
   
Recent Days: 
Recent Labs  
  01/23/21 
1717 01/22/21 
1040 WBC 3.6 4.4 HGB 11.8 12.3 HCT 36.0 37.5  186 Recent Labs  
  01/23/21 
1717 01/22/21 
1040  141  
K 4.7 3.9  102 CO2 30 31 * 186* BUN 30* 12 CREA 1.61* 1.04* CA 8.9 9.4 MG  --  1.6 ALB 3.2* 3.5 TBILI 0.5 0.8 ALT 22 26 INR  --  1.0 No results for input(s): PH, PCO2, PO2, HCO3, FIO2 in the last 72 hours. XR CHEST SNGL V Final Result Impression: The cardiomediastinal silhouette is appropriate for age, technique,  
and lung expansion. Pulmonary vasculature is not congested. The lungs are  
essentially clear. No effusion or pneumothorax is seen. Assessment/  
 
Problem List: 
Hospital Problems  Date Reviewed: 10/6/2020 Codes Class Noted POA Acute respiratory failure due to COVID-19 [facility]) ICD-10-CM: U07.1, J96.00 
ICD-9-CM: 518.81, 079.89  1/22/2021 Unknown History of CVA (cerebrovascular accident) ICD-10-CM: Z86.73 
ICD-9-CM: V12.54  10/5/2020 Yes Essential hypertension ICD-10-CM: I10 
ICD-9-CM: 401.9  10/5/2020 Yes Morbid obesity ([facility] [address] ICD-10-CM: E66.01 
ICD-9-CM: 278.01  10/5/2020 Yes DM (diabetes mellitus), type 2 ([facility] [address] ICD-10-CM: E11.9 ICD-9-CM: 250.00  10/5/2020 Yes Plan: 
78-year-old female came to the hospital with a complaint of shortness of breath and was found to have Covid pneumonia 1.   Acute respiratory failure due to Covid pneumonia: Continue patient on dexamethasone, Zithromax- switch to PO, patient was given 1 dose of ivermectin and also started on Actemra by ID and pulmonology. ID and pulmonary evaluation of the patient is appreciated. 2.  Diabetes mellitus:Patient's blood sugars are elevated. Patient has not started on her home medications as ordered were signed and held for now. Release the orders and will start the patient on her home medications along with sliding scale coverage. Will adjust medications based on her clinical response. Hold off on Lantus for now. 3.  Morbid obesity: Supportive care 4. Hypertension: Patient's home medications will be resumed we 5. Culture positive: Patient has 2 out of 4 bottles positive for gram-positive cocci. Patient discussed with ID, no need for IV abx as this is contaminant. Care Plan discussed with: Patient/Family and Nurse Lisa Nolasco MD

## 2021-01-24 NOTE — PROGRESS NOTES
Spoke with anesthesia in regards to coming to attempt to place IV. Cat Pierce he will come to floor as soon as he has time. Anesthesia unable to obtain IV access. Hospitalist paged.

## 2021-01-24 NOTE — PROGRESS NOTES
Lost Iv at approx 1100, [place], [place] notified and attempts to restart made by her however unsuccessful, Dr Delmy George notified and attempts made by him using US guided machine but was also unsuccessful, attempt also made by Yefri Cotter in ED and [facility] [address] [facility] with no success, per Jacky Burnette he will pass on to his colleague replacing him at 200. Night nurse aware of status.

## 2021-01-25 NOTE — PROGRESS NOTES
Infectious Disease Progress Note Subjective:  
 Assessed pt at bedside. Doing well, denies new complaints. No acute events since last seen, denies new complaints Objective:  
Physical Exam:  
 
Visit Vitals BP (!) 152/81 (BP 1 Location: Left arm, BP Patient Position: At rest) Pulse 72 Temp 98.3 °F (36.8 °C) Resp 22 Ht 5' 4\" (1.626 m) Wt 280 lb (127 kg) SpO2 94% BMI 48.06 kg/m² O2 Flow Rate (L/min): 1 l/min O2 Device: Room air Temp (24hrs), Av.1 °F (36.7 °C), Min:97.9 °F (36.6 °C), Max:98.3 °F (36.8 °C) No intake/output data recorded.  1901 -  0700 In: 240 [P.O.:240] Out: - General: NAD, alert, AAO x x 4 HEENT: NAILA, Moist mucosa Lungs: Decreased exp wheeze, no rhonchi Heart: S1S2+, RRR, no murmur Abdo: Soft, NT, ND, +BS Exts: No edema, + pulses b/l  
Skin: No wounds, No rashes or lesions Data Review:  
   
Recent Days: 
Recent Labs  
  21 
1142 21 
1717 WBC 4.9 3.6 HGB 11.6 11.8 HCT 35.7 36.0  172 Recent Labs  
  21 
1142 21 
1717 BUN 41* 30* CREA 1.50* 1.61* Microbiology Blood Cx : GPC in clusters Diagnostics CXR Results  (Last 48 hours) None Assessment/Plan 1. COVID-19 pneumonitis: Moderate disease, Off supplemental O2, maintaining O2 sats on RA Remains afebrile. Continue on Decadron, recommend steroid taper upon d/c S/p Ivermectin and tocilizumab On day # 4/5 of Azithromycin. Routine labs in the morning 2. H/o COPD, no acute exacerbation Clear lungs on exam, off supplemental O2 
  
3. GPC in clusters bacteremia: Likely skin contaminant No need for directed antimicrobial therapy, Vanc discontinued  
  
4. DM: Closely monitor BS while on steroid therapy Dispo: Tentative d/c in AM if continued clinical improvement Kassidy Senior MD 
 
2021

## 2021-01-25 NOTE — PROGRESS NOTES
Consult Pulmonary, Critical Care Name: Karmen Randolph MRN: [id number] : 1940 Hospital: [facility] Date: 2021  Admission date: 2021 Hospital Day: 4 Subjective/Interval History:  
Feels better today no specific complaints Hospital Problems  Date Reviewed: 10/6/2020 Codes Class Noted POA Acute respiratory failure due to COVID-19 [facility]) ICD-10-CM: U07.1, J96.00 
ICD-9-CM: 518.81, 079.89  2021 Unknown History of CVA (cerebrovascular accident) ICD-10-CM: Z86.73 
ICD-9-CM: V12.54  10/5/2020 Yes Essential hypertension ICD-10-CM: I10 
ICD-9-CM: 401.9  10/5/2020 Yes Morbid obesity ([facility] ICD-10-CM: E66.01 
ICD-9-CM: 278.01  10/5/2020 Yes DM (diabetes mellitus), type 2 ([facility] [address] ICD-10-CM: E11.9 ICD-9-CM: 250.00  10/5/2020 Yes IMPRESSION:  
1. Acute hypoxic respiratory failure oxygen off with saturation 91% 2. COVID-19 pneumonitis 3. COVID-19 infection 4. Nausea vomiting probable Covid gastroenteritis 5. COPD 6. Left hemiplegia from previous strokes 7. History CHF Body mass index is 48.06 kg/m². 8.   
  
RECOMMENDATIONS/PLAN:  
1. Follow oxygen saturation and adjust supplemental oxygen as necessary currently on room air 2. Remains on oral Decadron 3. Has received 1 dose of Actemra 4. Received 1 dose of ivermectin 5. Zosyn discontinued 6. We will hold remdesivir unless there is worsening hypoxia 7. We will give albuterol and Symbicort inhalers for underlying COPD 
8. [x] High complexity decision making was performed [x] See my orders for details Subjective/Initial History:  
 
I was asked by Will Montalvo MD to see Karmen Randolph  a 78 y.o.  female in consultation for a chief complaint of bilateral pneumonia COVID-19 infection acute hypoxic respiratory failure Allergies Allergen Reactions  Sulfa (Sulfonamide Antibiotics) Angioedema  Contrast Dye [Iodine] Itching MAR reviewed and pertinent medications noted or modified as needed Current Facility-Administered Medications Medication  azithromycin (ZITHROMAX) tablet 500 mg  
 dexAMETHasone (DECADRON) tablet 6 mg  tocilizumab 400 mg in 0.9% sodium chloride 100 mL infusion  amLODIPine (NORVASC) tablet 5 mg  atenoloL (TENORMIN) tablet 50 mg  
 atorvastatin (LIPITOR) tablet 40 mg  
 clopidogreL (PLAVIX) tablet 75 mg  furosemide (LASIX) tablet 40 mg  
 glipiZIDE (GLUCOTROL) tablet 10 mg  
 hydrALAZINE (APRESOLINE) tablet 25 mg  
 hydrALAZINE (APRESOLINE) tablet 50 mg  loratadine (CLARITIN) tablet 10 mg  
 losartan (COZAAR) tablet 50 mg  
 albuterol (PROVENTIL HFA, VENTOLIN HFA, PROAIR HFA) inhaler 2 Puff  budesonide-formoteroL (SYMBICORT) 160-4.5 mcg/actuation HFA inhaler 2 Puff  sodium chloride (NS) flush 5-40 mL  sodium chloride (NS) flush 5-40 mL  acetaminophen (TYLENOL) tablet 650 mg Or  acetaminophen (TYLENOL) suppository 650 mg  
 polyethylene glycol (MIRALAX) packet 17 g  promethazine (PHENERGAN) tablet 12.5 mg Or  
 ondansetron (ZOFRAN) injection 4 mg  enoxaparin (LOVENOX) injection 40 mg  
 famotidine (PF) (PEPCID) 20 mg in 0.9% sodium chloride 10 mL injection  glucose chewable tablet 16 g  
 dextrose (D50W) injection syrg 12.5-25 g  
 glucagon (GLUCAGEN) injection 1 mg  
 insulin lispro (HUMALOG) injection Patient PCP: Angeline Rivers MD 
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 She also has no past medical history of History of abuse in adulthood or History of abuse in childhood.  
PSH:   has no past surgical history on file. FHX: Family history is unknown by patient. SHX:  reports that she has quit smoking. She does not have any smokeless tobacco history on file. She reports that she does not use drugs. Systemic review: 
General she states her appetite has been poor recently does not notice any weight loss Eyes no double vision or momentary blindness ENT no facial pain over the sinuses Endocrinologic no polyuria polydipsia Musculoskeletal she has had weakness and muscle aches and pains with this illness for perhaps a week but again she is a poor historian Neurologic history of previous strokes she thought it was last July but in talking with the daughter it was  with left hemiplegia Gastrointestinal she did have nausea and vomiting yesterday several times no chronic nausea vomiting or diarrhea Genitourinary denies any pain or discomfort on urination or incontinence Cardiovascular has chest pain diaphoresis or ankle edema Respiratory states she was told she has COPD and is on a nebulizer and inhaler at home she takes the nebulizer 3 times a day but does not know what it is Objective:  
 
Vital Signs: Telemetry:    normal sinus rhythm Intake/Output:  
Visit Vitals BP (!) 152/81 (BP 1 Location: Left arm, BP Patient Position: At rest) Pulse 72 Temp 98.3 °F (36.8 °C) Resp 22 Ht 5' 4\" (1.626 m) Wt 127 kg (280 lb) SpO2 95% BMI 48.06 kg/m² Temp (24hrs), Av.1 °F (36.7 °C), Min:97.9 °F (36.6 °C), Max:98.3 °F (36.8 °C) O2 Device: Room air O2 Flow Rate (L/min): 1 l/min Wt Readings from Last 4 Encounters:  
21 127 kg (280 lb) Intake/Output Summary (Last 24 hours) at 2021 1133 Last data filed at 2021 7457 Gross per 24 hour Intake 240 ml Output  Net 240 ml Last shift:      No intake/output data recorded. Last 3 shifts:  1901 -  0700 In: 240 [P.O.:240] Out: - Physical Exam:  
General:  female; oxygen has come off she is in no distress with room air HEENT: NCAT, oral mucosa normal 
Eyes: anicteric; conjunctiva clear extraocular movements intact Neck: no nodes, obesity obscures determination of JVD no accessory muscle use Chest: no deformity,  
Cardiac: Regular rate and rhythm no murmur no ankle edema Lungs: Shallow breaths no wheezing no definite rales but very diminished breath sounds in the bases Abd: Obese soft positive bowel sounds no tenderness Ext: no edema; no joint swelling; No clubbing : clear urine Neuro: Awake alert oriented to person and location very mildly confused probably from previous strokes has left hemiplegia moves the right side Psych- no agitation, oriented to person; mild cognitive dysfunction is not able to give a straightforward history Skin: warm, dry, no cyanosis; 
Pulses: Brachial radial femoral pulses intact Capillary: Normal capillary refill Labs: 
 
Recent Labs  
  01/23/21 
1717 WBC 3.6 HGB 11.8  Recent Labs  
  01/23/21 
1717   
K 4.7  CO2 30  
* BUN 30* CREA 1.61* CA 8.9 ALB 3.2* ALT 22 /25 room air oxygen saturation 91% 1 L nasal oxygen with oxygen saturation 97% Room air oxygen saturation 94% Daughter gives history of room air oxygen saturation of 88% when rescue squad arrived  Procalcitonin less than 0.05 Nasal influenza smear negative COVID-19 antigen positive Lab Results Component Value Date/Time Culture result: (A) 01/22/2021 11:45 AM  
  Staphylococcus species, coagulase negative MULTIPLE COLONY TYPES/STRAINS growing in 2 of 4 bottles drawn Culture result:  01/22/2021 11:45 AM  
  Gram pos cocci in clusters 
growing in 2 of 4 bottles drawn Dr. Joaquin Parrish via Ms. Kenyon Russo on 1.23.21 at 15:25 by [email or web address] No results found for: TSH, TSHEXT, TSHEXT Imaging: CXR Results  (Last 48 hours) 01/22/21 1223  XR CHEST SNGL V Final result Impression:  Impression:  The cardiomediastinal silhouette is appropriate for age, technique,  
and lung expansion. Pulmonary vasculature is not congested. The lungs are  
essentially clear. No effusion or pneumothorax is seen. I disagree with above there is infiltrate in the left mid and left lower lung and probable the medial right base Narrative:  1 new comparison March 10 Results from [facility] Encounter encounter on 01/22/21 XR CHEST SNGL V  
 Narrative 1 new comparison March 10 Impression Impression: The cardiomediastinal silhouette is appropriate for age, technique, 
and lung expansion. Pulmonary vasculature is not congested. The lungs are 
essentially clear. No effusion or pneumothorax is seen. · Discussion: Patient is COVID-19 antigen positive with hypoxia and changes on chest x-ray. Will give Decadron and Actemra and 1 dose ivermectin. If there is progressive hypoxia will add Remdesivir ·  
· Grayson Hicks MD

## 2021-01-25 NOTE — ROUTINE PROCESS
Bedside shift report given to May Murguia RN  (oncoming nurse) by Simba Rainey RN (offgoing nurse). Report to include SBAR, plan of care, discharge planning, and patient's questions and concerns.

## 2021-01-26 NOTE — PROGRESS NOTES
YAIR Plan: 
 
-d/c home w/family 
-PCP follow up 
-[facility] via [facility]. Accepted for [facility] services via [facility]. SOC x 24-48 hours. DELFIN Cramer 
 Outbound call to [facility] ([facility] @ [phone]. Identified self, role, and nature of the call. Informed d/c recommendation is home w/HH. Verbal consent given for [facility] referrals to be sent out w/no preference. Referrals sent out via [facility]. Awaiting acceptance. Medicare pt has received, reviewed, and signed 2nd IM letter informing them of their right to appeal the discharge. Signed copy has been placed on pt bedside chart. DELFIN Cramer 
 
 
 
 
10:39AM Outbound call to [facility] ([facility]) @  [phone]. Emily Pastrana of the call re: POA documentation. DELFIN Cramer 
[id number]

## 2021-01-26 NOTE — PROGRESS NOTES
Physician Progress Note PATIENTRosan [place] 
CSN #:                  [id number] :                       1940 ADMIT DATE:       2021 10:39 AM 
DISCH DATE: 
RESPONDING 
PROVIDER #:        Clint Gonzalez MD 
 
 
 
 
QUERY TEXT: 
 
Patient admitted with COVID 19. Per NURSING SKIN ASSESSMENT, noted to also have decubitus ulcer. If possible, please document in progress notes and discharge summary the stage of the decubitus ulcer: The medical record reflects the following: 
Risk Factors: COVID 19, ARF, OBESITY, HEMIPARESIS, WHEEL CHAIR BOUND, COPD, DM II Clinical Indicators:  NURSES SKIN ASSESSMENT: Stage 2 noted on sacrum. ALB 3.2 Treatment: CXR, SKIN ASSESSMENT, WOUND CARE CONSULT, FREQ REPOSITIONING, LAB MONITORING Thank you, DEJUAN AgustinN, RN, CDI Specialist 
[phone] or [email or web address] 
Options provided: 
-- Stage 2 Decubitus Pressure Ulcer of SACRUM present on admission -- Stage 3 Decubitus Pressure Ulcer of SACRUM present on admission -- Stage 4 Decubitus Pressure Ulcer of SACRUM present on admission -- Stage 1 Decubitus Pressure Ulcer of SACRUM present on admission 
-- Other - I will add my own diagnosis -- Disagree - Not applicable / Not valid -- Disagree - Clinically unable to determine / Unknown 
-- Refer to Clinical Documentation Reviewer PROVIDER RESPONSE TEXT: 
 
This patient has a Stage 2 decubitus pressure ulcer of the SACRUM that was present on admission. Query created by: Sada Orellana on 2021 2:44 PM 
 
 
QUERY TEXT: 
 
Pt admitted with COVID 19. Pt noted to have >0.3MG/DL INCREASE IN CR WITHIN 48HRS. If possible, please document in the progress notes and discharge summary if you are evaluating and/or treating any of the following: The medical record reflects the following: 
Risk Factors: COVID 19, COVID PNEUMONIA, COPD, RESP FAILURE, OBESITY, HTN, DM II Clinical Indicators:  CR: 1.04,  CR: 1.61 Treatment: ID CONSULT, NS 1L BOLUS, IV ANTIBIOTICS, LAB MONITORING Thank you, TWIN Aldana, RN, CDI Specialist 
[phone] or [email or web address] 
Options provided: 
-- Acute kidney failure 
-- Acute kidney failure with acute tubular necrosis -- Acute kidney injury -- Other - I will add my own diagnosis -- Disagree - Not applicable / Not valid -- Disagree - Clinically unable to determine / Unknown 
-- Refer to Clinical Documentation Reviewer PROVIDER RESPONSE TEXT: 
 
This patient has an Acute kidney injury. Query created by: Liv Valenzuela on 1/25/2021 2:48 PM 
 
 
QUERY TEXT: 
 
Pt admitted with COVID 19. Pt noted to have FEVER, COVID PNEUMONIA, PROCAL 0.05, CRP 4.87, Acute Organ Failure. If possible, please document in the progress notes and discharge summary if you are evaluating and /or treating any of the following: The medical record reflects the following: 
Risk Factors: COVID 19, PNEUMONIA, ACUTE RESP FAILURE, OBESITY, DM II, COPD Clinical Indicators: TEMP: 99.7, ED MD:  Patients presents to the ED with chief complaint of Fever, PROCAL 0.05, CRP 4.87, CR 1.04 to 1.61 Treatment: CXR, ID CONSULT, BLOOD CULTURES, NS 1L BOLUS, IV DECADRON, IV ZITHROMAX, LAB MONITORING Thank you, TWIN Aldana, RN, CDI Specialist 
[phone] or [email or web address] 
Options provided: 
-- Sepsis, present on admission 
-- Sepsis, present on admission now resolved -- Sepsis, not present on admission 
-- No Sepsis, pneumonia only 
-- Sepsis was ruled out 
-- Other - I will add my own diagnosis -- Disagree - Not applicable / Not valid -- Disagree - Clinically unable to determine / Unknown 
-- Refer to Clinical Documentation Reviewer PROVIDER RESPONSE TEXT: 
 
This patient has sepsis that was not present on admission.  
 
Query created by: Liv Valenzuela on 1/25/2021 3:00 PM 
 
 
Electronically signed by:  Jerry Hilliard MD 1/26/2021 4:41 PM

## 2021-01-26 NOTE — PROGRESS NOTES
Hospitalist Progress Note Daily Progress Note: 1/26/2021 Subjective: The patient is seen for follow  up.  
70-year-old female with history of hypertension, diabetes, hyperlipidemia was admitted to the hospital with a complaint of flulike symptoms were found to have Covid positive on screen. She was taken off O2 today AM. Difficult IV access. We were able to get IJ iv . She denies any complaints Medications reviewed Current Facility-Administered Medications Medication Dose Route Frequency  [START ON 1/27/2021] dexAMETHasone (DECADRON) tablet 4 mg  4 mg Oral DAILY  azithromycin (ZITHROMAX) tablet 500 mg  500 mg Oral DAILY  amLODIPine (NORVASC) tablet 5 mg  5 mg Oral DAILY  atenoloL (TENORMIN) tablet 50 mg  50 mg Oral DAILY  atorvastatin (LIPITOR) tablet 40 mg  40 mg Oral QHS  clopidogreL (PLAVIX) tablet 75 mg  75 mg Oral DAILY  furosemide (LASIX) tablet 40 mg  40 mg Oral DAILY  glipiZIDE (GLUCOTROL) tablet 10 mg  10 mg Oral BID  hydrALAZINE (APRESOLINE) tablet 25 mg  25 mg Oral BID  hydrALAZINE (APRESOLINE) tablet 50 mg  50 mg Oral BID  loratadine (CLARITIN) tablet 10 mg  10 mg Oral DAILY  losartan (COZAAR) tablet 50 mg  50 mg Oral DAILY  albuterol (PROVENTIL HFA, VENTOLIN HFA, PROAIR HFA) inhaler 2 Puff  2 Puff Inhalation Q6HWA RT  
 budesonide-formoteroL (SYMBICORT) 160-4.5 mcg/actuation HFA inhaler 2 Puff  2 Puff Inhalation BID RT  
 sodium chloride (NS) flush 5-40 mL  5-40 mL IntraVENous Q8H  
 sodium chloride (NS) flush 5-40 mL  5-40 mL IntraVENous PRN  
 acetaminophen (TYLENOL) tablet 650 mg  650 mg Oral Q6H PRN Or  
 acetaminophen (TYLENOL) suppository 650 mg  650 mg Rectal Q6H PRN  polyethylene glycol (MIRALAX) packet 17 g  17 g Oral DAILY PRN  promethazine (PHENERGAN) tablet 12.5 mg  12.5 mg Oral Q6H PRN  Or  
 ondansetron (ZOFRAN) injection 4 mg  4 mg IntraVENous Q6H PRN  
 enoxaparin (LOVENOX) injection 40 mg  40 mg SubCUTAneous DAILY  famotidine (PF) (PEPCID) 20 mg in 0.9% sodium chloride 10 mL injection  20 mg IntraVENous BID  
 glucose chewable tablet 16 g  4 Tab Oral PRN  
 dextrose (D50W) injection syrg 12.5-25 g  25-50 mL IntraVENous PRN  
 glucagon (GLUCAGEN) injection 1 mg  1 mg IntraMUSCular PRN  
 insulin lispro (HUMALOG) injection   SubCUTAneous AC&HS Review of Systems: A comprehensive review of systems was negative except for that written in the HPI. Objective:  
Physical Exam:  
 
Visit Vitals BP (!) 169/98 (BP Patient Position: At rest;Head of bed elevated (Comment degrees)) Comment (BP Patient Position): 40 Pulse 79 Temp 99.2 °F (37.3 °C) Resp 20 Ht 5' 4\" (1.626 m) Wt 127 kg (280 lb) SpO2 95% BMI 48.06 kg/m² O2 Flow Rate (L/min): 1 l/min O2 Device: Room air Temp (24hrs), Av.1 °F (37.3 °C), Min:99 °F (37.2 °C), Max:99.2 °F (37.3 °C) No intake/output data recorded.  1901 -  0700 In: 240 [P.O.:240] Out: - PHYSICAL EXAM: 
General: Alert awake oriented, morbidly obese Skin: Extremities and face reveal no rashes. HEENT: Sclerae anicteric. Extra-occular muscles are intact. No oral ulcers. No ENT discharge. The neck is supple. Cardiovascular: Regular rate and rhythm. No murmurs, gallops, or rubs. PMI nondisplaced. Carotids without bruits. Respiratory: Comfortable breathing with no accessory muscle use. Clear breath sounds with no wheezes, rales, or rhonchi. GI: Abdomen nondistended, soft, and nontender. Normal active bowel sounds. No enlargement of the liver or spleen. No masses palpable. Rectal: Deferred Musculoskeletal: No pitting edema of the lower legs. Extremities have good range of motion. No costovertebral tenderness. Neurological: Gross memory appears intact. Patient is alert and oriented. Power 5/5, Cranial nerves II-XII. Starts talking about family and other things and has to be reoriented Psychiatric: Mood appears appropriate with judgement intact. Lymphatic: No cervical or supraclavicular adenopathy. Data Review:  
   
Recent Days: 
Recent Labs  
  01/26/21 
1155 01/25/21 
1142 01/23/21 
1717 WBC 4.4 4.9 3.6 HGB 12.1 11.6 11.8 HCT 36.1 35.7 36.0  176 172 Recent Labs  
  01/26/21 
0726 01/25/21 
1142 01/23/21 
1717  139 137  
K 4.7 4.4 4.7  104 100 CO2 25 26 30 * 234* 324* BUN 38* 41* 30* CREA 1.44* 1.50* 1.61* CA 9.9 9.2 8.9 PHOS 2.8 2.5*  --   
ALB 3.5 3.2* 3.2* TBILI  --   --  0.5 ALT  --   --  22 No results for input(s): PH, PCO2, PO2, HCO3, FIO2 in the last 72 hours. XR CHEST SNGL V Final Result Impression: The cardiomediastinal silhouette is appropriate for age, technique,  
and lung expansion. Pulmonary vasculature is not congested. The lungs are  
essentially clear. No effusion or pneumothorax is seen. Assessment/  
 
Problem List: 
Hospital Problems  Date Reviewed: 10/6/2020 Codes Class Noted POA Acute respiratory failure due to COVID-19 [facility]) ICD-10-CM: U07.1, J96.00 
ICD-9-CM: 518.81, 079.89  1/22/2021 Unknown History of CVA (cerebrovascular accident) ICD-10-CM: Z86.73 
ICD-9-CM: V12.54  10/5/2020 Yes Essential hypertension ICD-10-CM: I10 
ICD-9-CM: 401.9  10/5/2020 Yes Morbid obesity ([facility] ICD-10-CM: E66.01 
ICD-9-CM: 278.01  10/5/2020 Yes DM (diabetes mellitus), type 2 ([facility] ICD-10-CM: E11.9 ICD-9-CM: 250.00  10/5/2020 Yes Plan: 
68-year-old female came to the hospital with a complaint of shortness of breath and was found to have Covid pneumonia 1. Acute respiratory failure due to Covid pneumonia: Continue patient on dexamethasone, Zithromax- switch to PO, patient was given 1 dose of ivermectin and also started on Actemra by ID and pulmonology. ID and pulmonary evaluation of the patient is appreciated. 2.  Diabetes mellitus:Patient's blood sugars are elevated.   SSC will be given along with home meds. 3.  Morbid obesity: Supportive care 4. Hypertension: Patient's home medications will be resumed we 5. Culture positive: Patient has 2 out of 4 bottles positive for gram-positive cocci. Patient discussed with ID, no need for IV abx as this is contaminant. Care Plan discussed with: Patient/Family and Nurse Maria C Fernandes MD

## 2021-01-26 NOTE — PROGRESS NOTES
Consult Pulmonary, Critical Care Name: Anca Sierra MRN: [id number] : 1940 Hospital: [address] [place] [address] Date: 2021  Admission date: 2021 Hospital Day: 5 Subjective/Interval History:  
Feels better today no specific complaints Hospital Problems  Date Reviewed: 10/6/2020 Codes Class Noted POA Acute respiratory failure due to COVID-19 [facility]) ICD-10-CM: U07.1, J96.00 
ICD-9-CM: 518.81, 079.89  2021 Unknown History of CVA (cerebrovascular accident) ICD-10-CM: Z86.73 
ICD-9-CM: V12.54  10/5/2020 Yes Essential hypertension ICD-10-CM: I10 
ICD-9-CM: 401.9  10/5/2020 Yes Morbid obesity ([facility] ICD-10-CM: E66.01 
ICD-9-CM: 278.01  10/5/2020 Yes DM (diabetes mellitus), type 2 ([facility] [address] ICD-10-CM: E11.9 ICD-9-CM: 250.00  10/5/2020 Yes IMPRESSION:  
1. Acute hypoxic respiratory failure oxygen off with saturation 93% 2. Coagulase-negative staph in 4 of 4 blood cultures WBC count normal no significant fever currently on azithromycin continue to follow CBC and temperature curve we will repeat blood cultures 3. COVID-19 pneumonitis 4. COVID-19 infection 5. Nausea vomiting probable Covid gastroenteritis resolved 6. COPD 7. Left hemiplegia from previous strokes 8. History CHF Body mass index is 48.06 kg/m². 9.   
  
RECOMMENDATIONS/PLAN:  
1. Remain on room air 2. Decrease Decadron to 4 mg daily 3. Has received 1 dose of Actemra 4. Received 1 dose of ivermectin 5. Zosyn discontinued 6. We will hold remdesivir unless there is worsening hypoxia 7. We will give albuterol and Symbicort inhalers for underlying COPD 
8. [x] High complexity decision making was performed [x] See my orders for details Subjective/Initial History:  
 
I was asked by Pritesh Gil MD to see Anca Sierra  a 78 y.o.    female in consultation for a chief complaint of bilateral pneumonia COVID-19 infection acute hypoxic respiratory failure Allergies Allergen Reactions  Sulfa (Sulfonamide Antibiotics) Angioedema  Contrast Dye [Iodine] Itching MAR reviewed and pertinent medications noted or modified as needed Current Facility-Administered Medications Medication  azithromycin (ZITHROMAX) tablet 500 mg  
 dexAMETHasone (DECADRON) tablet 6 mg  
 amLODIPine (NORVASC) tablet 5 mg  atenoloL (TENORMIN) tablet 50 mg  
 atorvastatin (LIPITOR) tablet 40 mg  
 clopidogreL (PLAVIX) tablet 75 mg  furosemide (LASIX) tablet 40 mg  
 glipiZIDE (GLUCOTROL) tablet 10 mg  
 hydrALAZINE (APRESOLINE) tablet 25 mg  
 hydrALAZINE (APRESOLINE) tablet 50 mg  loratadine (CLARITIN) tablet 10 mg  
 losartan (COZAAR) tablet 50 mg  
 albuterol (PROVENTIL HFA, VENTOLIN HFA, PROAIR HFA) inhaler 2 Puff  budesonide-formoteroL (SYMBICORT) 160-4.5 mcg/actuation HFA inhaler 2 Puff  sodium chloride (NS) flush 5-40 mL  sodium chloride (NS) flush 5-40 mL  acetaminophen (TYLENOL) tablet 650 mg Or  acetaminophen (TYLENOL) suppository 650 mg  
 polyethylene glycol (MIRALAX) packet 17 g  promethazine (PHENERGAN) tablet 12.5 mg Or  
 ondansetron (ZOFRAN) injection 4 mg  enoxaparin (LOVENOX) injection 40 mg  
 famotidine (PF) (PEPCID) 20 mg in 0.9% sodium chloride 10 mL injection  glucose chewable tablet 16 g  
 dextrose (D50W) injection syrg 12.5-25 g  
 glucagon (GLUCAGEN) injection 1 mg  
 insulin lispro (HUMALOG) injection Patient PCP: Carlito Gavin MD 
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  She also has no past medical history of History of abuse in adulthood or History of abuse in childhood. PSH:   has no past surgical history on file. FHX: Family history is unknown by patient. SHX:  reports that she has quit smoking. She does not have any smokeless tobacco history on file. She reports that she does not use drugs. Systemic review: 
General she states her appetite has been poor recently does not notice any weight loss Eyes no double vision or momentary blindness ENT no facial pain over the sinuses Endocrinologic no polyuria polydipsia Musculoskeletal she has had weakness and muscle aches and pains with this illness for perhaps a week but again she is a poor historian Neurologic history of previous strokes she thought it was last July but in talking with the daughter it was 2019 with left hemiplegia Gastrointestinal she did have nausea and vomiting yesterday several times no chronic nausea vomiting or diarrhea Genitourinary denies any pain or discomfort on urination or incontinence Cardiovascular has chest pain diaphoresis or ankle edema Respiratory states she was told she has COPD and is on a nebulizer and inhaler at home she takes the nebulizer 3 times a day but does not know what it is Objective:  
 
Vital Signs: Telemetry:    normal sinus rhythm Intake/Output:  
Visit Vitals BP (!) 169/98 (BP Patient Position: At rest;Head of bed elevated (Comment degrees)) Comment (BP Patient Position): 40 Pulse 79 Temp 99.2 °F (37.3 °C) Resp 20 Ht 5' 4\" (1.626 m) Wt 127 kg (280 lb) SpO2 96% BMI 48.06 kg/m² Temp (24hrs), Av.1 °F (37.3 °C), Min:99 °F (37.2 °C), Max:99.2 °F (37.3 °C) O2 Device: Room air O2 Flow Rate (L/min): 1 l/min Wt Readings from Last 4 Encounters:  
21 127 kg (280 lb) No intake or output data in the 24 hours ending 21 1028 Last shift:      No intake/output data recorded. Last 3 shifts:  190 -  0700 In: 240 [P.O.:240] Out: - Physical Exam:  
General:  female;   in no distress with room air HEENT: NCAT, oral mucosa normal 
Eyes: anicteric; conjunctiva clear extraocular movements intact Neck: no nodes, obesity obscures determination of JVD no accessory muscle use Chest: no deformity,  
Cardiac: Regular rate and rhythm no murmur no ankle edema Lungs: Shallow breaths no wheezing no definite rales but very diminished breath sounds in the bases Abd: Obese soft positive bowel sounds no tenderness Ext: no edema; no joint swelling; No clubbing : clear urine Neuro: Awake alert oriented to person and location very mildly confused probably from previous strokes has left hemiplegia moves the right side Psych- no agitation, oriented to person; mild cognitive dysfunction is not able to give a straightforward history Skin: warm, dry, no cyanosis; 
Pulses: Brachial radial femoral pulses intact Capillary: Normal capillary refill Labs: 
 
Recent Labs  
  01/25/21 
1142 01/23/21 
1717 WBC 4.9 3.6 HGB 11.6 11.8  172 Recent Labs  
  01/26/21 
0726 01/25/21 
1142 01/23/21 
1717  139 137  
K 4.7 4.4 4.7  104 100 CO2 25 26 30 * 234* 324* BUN 38* 41* 30* CREA 1.44* 1.50* 1.61* CA 9.9 9.2 8.9 PHOS 2.8 2.5*  --   
ALB 3.5 3.2* 3.2* ALT  --   --  22 1/26 room air oxygen saturation 93% 1 L nasal oxygen with oxygen saturation 97% Room air oxygen saturation 94% Daughter gives history of room air oxygen saturation of 88% when rescue squad arrived CRP 2.87, 4.87  Procalcitonin less than 0.05 Nasal influenza smear negative COVID-19 antigen positive Lab Results Component Value Date/Time Culture result: (A) 01/22/2021 11:45 AM  
  Staphylococcus species, coagulase negative MULTIPLE COLONY TYPES/STRAINS growing in all 4 bottles drawn Culture result:  01/22/2021 11:45 AM  
  Gram pos cocci in clusters 
growing in 2 of 4 bottles drawn Dr. Magdaleno Siemens via [facility] Felisha Rodrigo on 1.23.21 at 15:25 by tlw Imaging: CXR Results  (Last 48 hours) 01/22/21 1223  XR CHEST SNGL V Final result Impression:  Impression: The cardiomediastinal silhouette is appropriate for age, technique,  
and lung expansion. Pulmonary vasculature is not congested. The lungs are  
essentially clear. No effusion or pneumothorax is seen. I disagree with above there is infiltrate in the left mid and left lower lung and probable the medial right base Narrative:  1 new comparison March 10 Results from [facility] Encounter encounter on 01/22/21 XR CHEST SNGL V  
 Narrative 1 new comparison March 10 Impression Impression: The cardiomediastinal silhouette is appropriate for age, technique, 
and lung expansion. Pulmonary vasculature is not congested. The lungs are 
essentially clear. No effusion or pneumothorax is seen. · Discussion: Patient is COVID-19 antigen positive with hypoxia and changes on chest x-ray. Will give Decadron and Actemra and 1 dose ivermectin. If there is progressive hypoxia will add Remdesivir ·  
· Susan Servin MD

## 2021-01-26 NOTE — PROGRESS NOTES
Hospitalist Progress Note Daily Progress Note: 1/26/2021 Subjective: The patient is seen for follow  up. Patient seen and evaluated at bedside, of note patient was weaned off of oxygen, patient currently has no active complaints, discussed with RN as well as case management Medications reviewed Current Facility-Administered Medications Medication Dose Route Frequency  [START ON 1/27/2021] dexAMETHasone (DECADRON) tablet 4 mg  4 mg Oral DAILY  azithromycin (ZITHROMAX) tablet 500 mg  500 mg Oral DAILY  amLODIPine (NORVASC) tablet 5 mg  5 mg Oral DAILY  atenoloL (TENORMIN) tablet 50 mg  50 mg Oral DAILY  atorvastatin (LIPITOR) tablet 40 mg  40 mg Oral QHS  clopidogreL (PLAVIX) tablet 75 mg  75 mg Oral DAILY  furosemide (LASIX) tablet 40 mg  40 mg Oral DAILY  glipiZIDE (GLUCOTROL) tablet 10 mg  10 mg Oral BID  hydrALAZINE (APRESOLINE) tablet 25 mg  25 mg Oral BID  hydrALAZINE (APRESOLINE) tablet 50 mg  50 mg Oral BID  loratadine (CLARITIN) tablet 10 mg  10 mg Oral DAILY  losartan (COZAAR) tablet 50 mg  50 mg Oral DAILY  albuterol (PROVENTIL HFA, VENTOLIN HFA, PROAIR HFA) inhaler 2 Puff  2 Puff Inhalation Q6HWA RT  
 budesonide-formoteroL (SYMBICORT) 160-4.5 mcg/actuation HFA inhaler 2 Puff  2 Puff Inhalation BID RT  
 sodium chloride (NS) flush 5-40 mL  5-40 mL IntraVENous Q8H  
 sodium chloride (NS) flush 5-40 mL  5-40 mL IntraVENous PRN  
 acetaminophen (TYLENOL) tablet 650 mg  650 mg Oral Q6H PRN Or  
 acetaminophen (TYLENOL) suppository 650 mg  650 mg Rectal Q6H PRN  polyethylene glycol (MIRALAX) packet 17 g  17 g Oral DAILY PRN  promethazine (PHENERGAN) tablet 12.5 mg  12.5 mg Oral Q6H PRN Or  
 ondansetron (ZOFRAN) injection 4 mg  4 mg IntraVENous Q6H PRN  
 enoxaparin (LOVENOX) injection 40 mg  40 mg SubCUTAneous DAILY  famotidine (PF) (PEPCID) 20 mg in 0.9% sodium chloride 10 mL injection  20 mg IntraVENous BID  glucose chewable tablet 16 g  4 Tab Oral PRN  
 dextrose (D50W) injection syrg 12.5-25 g  25-50 mL IntraVENous PRN  
 glucagon (GLUCAGEN) injection 1 mg  1 mg IntraMUSCular PRN  
 insulin lispro (HUMALOG) injection   SubCUTAneous AC&HS Review of Systems: A comprehensive review of systems was negative except for that written in the HPI. Objective:  
Physical Exam:  
 
Visit Vitals BP (!) 169/98 (BP Patient Position: At rest;Head of bed elevated (Comment degrees)) Comment (BP Patient Position): 40 Pulse 79 Temp 99.2 °F (37.3 °C) Resp 20 Ht 5' 4\" (1.626 m) Wt 127 kg (280 lb) SpO2 95% BMI 48.06 kg/m² O2 Flow Rate (L/min): 1 l/min O2 Device: Room air Temp (24hrs), Av.1 °F (37.3 °C), Min:99 °F (37.2 °C), Max:99.2 °F (37.3 °C) No intake/output data recorded.  1901 -  0700 In: 240 [P.O.:240] Out: - PHYSICAL EXAM: 
General: Alert awake oriented, morbidly obese Skin: Extremities and face reveal no rashes. HEENT: Sclerae anicteric. Extra-occular muscles are intact. No oral ulcers. No ENT discharge. The neck is supple. Cardiovascular: Regular rate and rhythm. No murmurs, gallops, or rubs. PMI nondisplaced. Carotids without bruits. Respiratory: Comfortable breathing with no accessory muscle use. Clear breath sounds with no wheezes, rales, or rhonchi. GI: Abdomen nondistended, soft, and nontender. Normal active bowel sounds. No enlargement of the liver or spleen. No masses palpable. Rectal: Deferred Musculoskeletal: No pitting edema of the lower legs. Extremities have good range of motion. No costovertebral tenderness. Neurological: Gross memory appears intact. Patient is alert and oriented. Power 5/5, Cranial nerves II-XII. Starts talking about family and other things and has to be reoriented Psychiatric: Mood appears appropriate with judgement intact. Lymphatic: No cervical or supraclavicular adenopathy.  
 
Data Review:  
   
Recent Days: 
Recent Labs 01/26/21 
1155 01/25/21 
1142 01/23/21 
1717 WBC 4.4 4.9 3.6 HGB 12.1 11.6 11.8 HCT 36.1 35.7 36.0  176 172 Recent Labs  
  01/26/21 
0726 01/25/21 
1142 01/23/21 
1717  139 137  
K 4.7 4.4 4.7  104 100 CO2 25 26 30 * 234* 324* BUN 38* 41* 30* CREA 1.44* 1.50* 1.61* CA 9.9 9.2 8.9 PHOS 2.8 2.5*  --   
ALB 3.5 3.2* 3.2* TBILI  --   --  0.5 ALT  --   --  22 No results for input(s): PH, PCO2, PO2, HCO3, FIO2 in the last 72 hours. XR CHEST SNGL V Final Result Impression: The cardiomediastinal silhouette is appropriate for age, technique,  
and lung expansion. Pulmonary vasculature is not congested. The lungs are  
essentially clear. No effusion or pneumothorax is seen. Assessment/  
 
Problem List: 
Hospital Problems  Date Reviewed: 10/6/2020 Codes Class Noted POA Acute respiratory failure due to COVID-19 [facility]) ICD-10-CM: U07.1, J96.00 
ICD-9-CM: 518.81, 079.89  1/22/2021 Unknown History of CVA (cerebrovascular accident) ICD-10-CM: Z86.73 
ICD-9-CM: V12.54  10/5/2020 Yes Essential hypertension ICD-10-CM: I10 
ICD-9-CM: 401.9  10/5/2020 Yes Morbid obesity ([facility] [address] ICD-10-CM: E66.01 
ICD-9-CM: 278.01  10/5/2020 Yes DM (diabetes mellitus), type 2 ([facility] [address] ICD-10-CM: E11.9 ICD-9-CM: 250.00  10/5/2020 Yes Plan: 
49-year-old female came to the hospital with a complaint of shortness of breath and was found to have Covid pneumonia 1. Acute respiratory failure due to Covid pneumonia: Continue patient on dexamethasone, Zithromax- switch to PO, patient was given 1 dose of ivermectin and also started on Actemra by ID and pulmonology. ID and pulmonary evaluation of the patient is appreciated. 2.  Diabetes mellitus:Patient's blood sugars are elevated. SSC will be given along with home meds. 3.  Morbid obesity: Supportive care 4.   Hypertension: Patient's home medications will be resumed we 5. Culture positive: Patient has 2 out of 4 bottles positive for gram-positive cocci. Patient discussed with ID, no need for IV abx as this is contaminant. Dispositinon - likely today after PT/OT evaluation likely home with home health today Care Plan discussed with: Patient/Family and Nurse Teresa Raymond MD

## 2021-01-26 NOTE — PROGRESS NOTES
PHYSICAL THERAPY EVALUATION Patient: Donal Sparrow (18 y.o. female) Date: 1/26/2021 Primary Diagnosis: Acute respiratory failure due to COVID-19 ([facility]) [U07.1, J96.00] Precautions: droplet plus, falls ASSESSMENT Pt is 77 y/o female came to [facility] with SOB and cough and adm 1/26/2021 for COVID 19 positive, acute hypoxic respiratory failure, Covid pneumonia. Pt has hx of arthritis, CHF, DM, CVA, morbid obesity, urinary incontinence.  
  
Pt received seated EOB agreeable for PT/OT eval/tx. Per pt report and chart review, pt lives with family (daughters assist [facility]) in one story home with ramp to enter and requires assist for all self care (able to complete simple grooming and self feed with set up and universal cuff) and total A for functional transfers/mobility at home with use of haresh lift into WC from hospital bed. Per pt report, pt walked a little bit with Rw however it has \"been a while. \" 
  
Pt currently presents most likely close to baseline for functional transfers/mobility currently requiring heavy max A sup->sit, max Ax2 sit->sup, max Ax2 scooting bed level and max Ax2 attempt sit<->stand unable to clear bottom from bed. Pt would benefit from skilled PT services while at [facility] in order to increase safety and independence with functional transfers/mobility. Recommend discharge to home with HHPT when medically appropriate.   
  
Other factors to consider for discharge: close to baseline, good family support at home PLAN : 
Recommendations and Planned Interventions: bed mobility training, transfer training, therapeutic exercises, patient and family training/education and therapeutic activities Frequency/Duration: Patient will be followed by physical therapy:  2 times a week to address goals. Recommendation for discharge: (in order for the patient to meet his/her long term goals) HHPT with family care This discharge recommendation: 
Has been made in collaboration with the attending provider and/or case management IF patient discharges home will need the following DME: none SUBJECTIVE:  
Patient stated I'm ready to go home, can you call my daughter.  OBJECTIVE DATA SUMMARY:  
HISTORY:   
Past Medical History:  
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  
 History of CVA (cerebrovascular accident) 10/5/2020  Morbid obesity ([facility] [address] 10/5/2020  Pure hypercholesterolemia 10/5/2020  Urinary incontinence 10/5/2020 No past surgical history on file. Home Situation Home Environment: Private residence Wheelchair Ramp: Yes One/Two Story Residence: One story Living Alone: No 
Support Systems: Family member(s), Home care staff EXAMINATION/PRESENTATION/DECISION MAKING:  
Critical Behavior: 
Neurologic State: Alert Orientation Level: Oriented to person(oriented to type of place) Cognition: Decreased attention/concentration Hearing: 
  
Skin:  Intact where visible Edema: none noted Range Of Motion: 
AROM: Grossly decreased, non-functional(left shoulder significant shoulder flex limitations) PROM: Grossly decreased, non-functional(left shoulder significant shoulder flex limitations) Strength:   
Strength: Grossly decreased, non-functional(grossly 2-/5) Tone & Sensation:  
  
  
  
  
  
  
  
  
  
   
Coordination: 
  
Vision:  
  
Functional Mobility: 
Bed Mobility: 
Rolling: Maximum assistance Supine to Sit: Maximum assistance Sit to Supine: Maximum assistance;Assist x2 Scooting: Total assistance;Assist x2 Transfers: 
  
  
     
  
     
  
  
Balance:  
Sitting: Impaired Sitting - Static: Good (unsupported) Sitting - Dynamic: Fair (occasional) Ambulation/Gait Training: 
  not completed this session Therapeutic Exercises: Not completed this session Functional Measure: 
[employer] AM-PAC 6 Clicks Basic Mobility Inpatient Short Form How much difficulty does the patient currently have. .. Unable A Lot A Little None 1. Turning over in bed (including adjusting bedclothes, sheets and blankets)? [] 1   [x] 2   [] 3   [] 4  
2. Sitting down on and standing up from a chair with arms ( e.g., wheelchair, bedside commode, etc.)   [] 1   [x] 2   [] 3   [] 4  
3. Moving from lying on back to sitting on the side of the bed? [] 1   [x] 2   [] 3   [] 4 How much help from another person does the patient currently need. .. Total A Lot A Little None 4. Moving to and from a bed to a chair (including a wheelchair)? [x] 1   [] 2   [] 3   [] 4  
5. Need to walk in hospital room? [x] 1   [] 2   [] 3   [] 4  
6. Climbing 3-5 steps with a railing? [x] 1   [] 2   [] 3   [] 4  
© , [employer] of [employer], under license to [employer]. All rights reserved Score:  Initial:  Most Recent: X (Date: 2021 ) Interpretation of Tool:  Represents activities that are increasingly more difficult (i.e. Bed mobility, Transfers, Gait). Score 24 23 22-20 19-15 14-10 9-7 6 Modifier CH CI CJ CK CL CM CN Physical Therapy Evaluation Charge Determination History Examination Presentation Decision-Making HIGH Complexity :3+ comorbidities / personal factors will impact the outcome/ POC  HIGH Complexity : 4+ Standardized tests and measures addressing body structure, function, activity limitation and / or participation in recreation  LOW Complexity : Stable, uncomplicated  Other outcome measures [facility] 6  high Based on the above components, the patient evaluation is determined to be of the following complexity level: LOW Pain Ratin/10 Activity Tolerance:  
Fair and requires rest breaks After treatment patient left in no apparent distress:  
Supine in bed, Call bell within reach, Bed / chair alarm activated and Side rails x 3, CM and MD updated. GOALS: 
 
Problem: Mobility Impaired (Adult and Pediatric) Goal: *Acute Goals and Plan of Care (Insert Text) Description: Pt will be I with LE HEP in 7 days. Pt will perform bed mobility with mod Ax1 in 7 days. Pt will perform transfers with mod Ax1 in 7 days. Outcome: Not Met COMMUNICATION/EDUCATION:  
The patients plan of care was discussed with: Occupational therapist, Registered nurse, Physician and Case management. Fall prevention education was provided and the patient/caregiver indicated understanding., Patient/family have participated as able in goal setting and plan of care. and Patient/family agree to work toward stated goals and plan of care. PT/OT sessions occurred together for increased safety of pt and clinician. Thank you for this referral. 
Celina Martinez, PT, DPT Time Calculation: 22 mins

## 2021-01-26 NOTE — PROGRESS NOTES
Problem: Self Care Deficits Care Plan (Adult) Goal: *Acute Goals and Plan of Care (Insert Text) Description: Pt will be mod A sup<->sit in prep for EOB ADL's Pt will be SBA  sit EOB 10 minutes in prep for EOB ADL's Pt will be SBA  grooming EOB level Pt will be max A  sit<-> prep for toilet transfer Pt will be SBA will UE HEP in prep for self care tasks Outcome: Not Met OCCUPATIONAL THERAPY EVALUATION Patient: Malik Torres (59 y.o. female) Date: 1/26/2021 Primary Diagnosis: Acute respiratory failure due to COVID-19 ([facility]) [U07.1, J96.00] Precautions:   
 
ASSESSMENT Pt is 77 y/o female came to [facility] with SOb and cough and adm 1/26/2021 for COVID 19 positive, acute hypoxic respiratory failure, Covid pneumonia. Pt has hx of arthritis, CHF, DM, CVA, morbid obesity, urinary incontinence. Pt received seated EOB with PT present and agreeable for OT/PT eval/tx. Per pt report and chart review, pt lives with family (daughters assist and [facility]) in one story home with ramp to enter and requires assist for all self care (able to complete simple grooming and self feed with set up and universal cuff) and total A for functional transfers/mobility at home with use of haresh lift into WC from hospital bed. Per pt report, pt walked a little bit with Rw however it has \"been a while. \" 
 
Pt currently presents most likely close to baseline for self care (SBA simple grooming EOB, total A toleting simulated and LB dressing simulated) and functional transfers/mobility (heavy max A sup->sit, max Ax2 sit->sup, max Ax2 scooting bed level and max Ax2 attempt sit<->stand unable to clear bottom from bed. Pt would benefit from skilled OT services while at [facility] in order to increase safety and independence with self care and functional transfers/mobility. Recommend discharge to home with [facility] when medically appropriate.    
 
Other factors to consider for discharge: close to baseline, good family support at home PLAN : 
Recommendations and Planned Interventions: self care training, functional mobility training, therapeutic exercise, balance training, therapeutic activities, endurance activities, patient education, and home safety training Frequency/Duration: Patient will be followed by occupational therapy 2 times a week to address goals. Recommendation for discharge: (in order for the patient to meet his/her long term goals) Antonio Xie This discharge recommendation: 
Has been made in collaboration with the attending provider and/or case management IF patient discharges home will need the following DME: none SUBJECTIVE:  
Patient stated Eve Young you call my daughter.  OBJECTIVE DATA SUMMARY:  
HISTORY:  
Past Medical History:  
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 Expanded or extensive additional review of patient history:  
 
Home Situation Home Environment: Private residence Wheelchair Ramp: Yes One/Two Story Residence: One story Living Alone: No 
Support Systems: Family member(s), Home care staff PLOF: Pt requires assist for ADLS/IADLS and total A with mobility prior to admission. EXAMINATION OF PERFORMANCE DEFICITS: 
Cognitive/Behavioral Status: 
Neurologic State: Alert Orientation Level: Oriented to person(oriented to type of place) Cognition: Decreased attention/concentration Range of Motion: 
AROM: Grossly decreased, non-functional(left shoulder significant shoulder flex limitations) PROM: Grossly decreased, non-functional(left shoulder significant shoulder flex limitations) Strength: 
Strength: Grossly decreased, non-functional(grossly 2-/5) Balance: 
Sitting: Impaired Sitting - Static: Good (unsupported) Sitting - Dynamic: Fair (occasional) Functional Mobility and Transfers for ADLs: 
Bed Mobility: 
Rolling: Maximum assistance Supine to Sit: Maximum assistance Sit to Supine: Maximum assistance;Assist x2 Scooting: Total assistance;Assist x2 Transfers: 
Assistive Device : Gait Belt;Walker, rollator ADL Assessment: 
  
Oral Facial Hygiene/Grooming: Stand-by assistance Lower Body Dressing: Total assistance(simulated) Toileting: Total assistance(simulated) ADL Intervention and task modifications: 
  
Grooming Grooming Assistance: Stand-by assistance Position Performed: Seated edge of bed Lower Body Dressing Assistance Socks: Total assistance (dependent) Toileting Toileting Assistance: Total assistance(dependent)(simulated) [facility] \"6 Clicks\" Daily Activity Inpatient Short Form How much help from another person does the patient currently need. .. Total; A Lot A Little None 1. Putting on and taking off regular lower body clothing? [x]  1 []  2 []  3 []  4  
2. Bathing (including washing, rinsing, drying)? []  1 [x]  2 []  3 []  4  
3. Toileting, which includes using toilet, bedpan or urinal? [x] 1 []  2 []  3 []  4  
4. Putting on and taking off regular upper body clothing? []  1 [x]  2 []  3 []  4  
5. Taking care of personal grooming such as brushing teeth? []  1 [x]  2 []  3 []  4  
6. Eating meals? []  1 []  2 [x]  3 []  4  
© 2007, Trustees of [employer], under license to [employer]. All rights reserved Score: 11/24 Interpretation of Tool:  Represents clinically-significant functional categories (i.e. Activities of daily living). Percentage of Impairment CH 
 
0% 
 CI 
 
1-19% CJ 
 
20-39% CK 
 
40-59% CL 
 
60-79% CM 
 
80-99% CN  
 
100% AMPA Score 6-24 24 23 20-22 15-19 10-14 7-9 6  
  
 
Occupational Therapy Evaluation Charge Determination History Examination Decision-Making LOW Complexity : Brief history review  MEDIUM Complexity : 3-5 performance deficits relating to physical, cognitive , or psychosocial skils that result in activity limitations and / or participation restrictions MEDIUM Complexity : Patient may present with comorbidities that affect occupational performnce. Miniml to moderate modification of tasks or assistance (eg, physical or verbal ) with assesment(s) is necessary to enable patient to complete evaluation Based on the above components, the patient evaluation is determined to be of the following complexity level: LOW Pain Rating: 
No pain reported Activity Tolerance:  
Fair and requires rest breaks Please refer to the flowsheet for vital signs taken during this treatment. After treatment patient left in no apparent distress:   
Supine in bed, Call bell within reach, and Side rails x 3 
 
COMMUNICATION/EDUCATION:  
The patients plan of care was discussed with: Physical therapist.  
PT/OT sessions occurred together for increased safety of pt and clinician. Home safety education was provided and the patient/caregiver indicated understanding. and Patient/family have participated as able in goal setting and plan of care. This patients plan of care is appropriate for delegation to [facility]. Thank you for this referral. 
Heidy Iniguez Time Calculation: 16 mins

## 2021-01-26 NOTE — WOUND CARE
IP WOUND CONSULT Elliot Easton MEDICAL RECORD NUMBER:  [id number] AGE: 78 y.o. GENDER: female  : 1940 TODAY'S DATE:  2021 GENERAL  
 
[] Follow-up [x] New Consult Elliot Easton is a 78 y.o. female referred by:  
[x] Physician 
[] Nursing 
[] Other: PAST MEDICAL HISTORY Past Medical History:  
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  
 History of CVA (cerebrovascular accident) 10/5/2020  Morbid obesity ([facility] [address] 10/5/2020  Pure hypercholesterolemia 10/5/2020  Urinary incontinence 10/5/2020 PAST SURGICAL HISTORY No past surgical history on file. FAMILY HISTORY Family History Family history unknown: Yes ALLERGIES Allergies Allergen Reactions  Sulfa (Sulfonamide Antibiotics) Angioedema  Contrast Dye [Iodine] Itching MEDICATIONS No current facility-administered medications on file prior to encounter. Current Outpatient Medications on File Prior to Encounter Medication Sig Dispense Refill  loratadine (CLARITIN) 10 mg tablet TAKE 1 TABLET BY MOUTH DAILY. 30 Tab 5  
 atorvastatin (LIPITOR) 40 mg tablet TAKE ONE TABLET BY MOUTH AT BEDTIME. 90 Tab 1  clopidogreL (PLAVIX) 75 mg tab TAKE ONE TABLET BY MOUTH DAILY. 90 Tab 1  
 atenoloL (TENORMIN) 50 mg tablet TAKE 1 TABLET BY MOUTH TWICE DAILY 180 Tab 1  
 amLODIPine (NORVASC) 10 mg tablet TAKE ONE TABLET BY MOUTH DAILY. 90 Tab 1  
 omeprazole (PRILOSEC) 40 mg capsule TAKE 1 CAPSULE BY MOUTH ONCE DAILY 30 MINUTES BEFORE EATING. 90 Cap 1  
 losartan (COZAAR) 50 mg tablet Take 1 Tab by mouth daily.  hydrALAZINE (APRESOLINE) 25 mg tablet Take 1 Tab by mouth two (2) times a day.  furosemide (LASIX) 40 mg tablet Take 1 Tab by mouth daily.     
 glipiZIDE (GLUCOTROL) 10 mg tablet Take 10 mg by mouth two (2) times a day.    
 hydrALAZINE (APRESOLINE) 50 mg tablet Take 1 Tab by mouth two (2) times a day. Dose increased from 25 to 50 mg twice a day  Indications: high blood pressure 180 Tab 1 [unfilled] Visit Vitals BP (!) 169/98 (BP Patient Position: At rest;Head of bed elevated (Comment degrees)) Comment (BP Patient Position): 40 Pulse 79 Temp 99.2 °F (37.3 °C) Resp 20 Ht 5' 4\" (1.626 m) Wt 127 kg (280 lb) SpO2 96% BMI 48.06 kg/m² ASSESSMENT Wound Identification & Type: Stage 2 PI x 2 to sacum Dressing change: Yes, Medihoney and Mepilex Sacrum Verbal consent for picture: Yes Contributing Factors: anticoagulation therapy, diabetes, poor glucose control, chronic pressure, decreased mobility, shear force, incontinence of stool, incontinence of urine and obesity Wound Sacrum (Active) Wound Image   01/26/21 1307 Wound Etiology Pressure Stage 2 01/26/21 1307 Dressing Status Reinforced dressing 01/26/21 1307 Cleansed Cleansed with saline 01/26/21 1307 Dressing/Treatment Foam;Honey gel/honey paste 01/26/21 1307 Dressing Change Due 01/27/21 01/26/21 1307 Wound Assessment Dry;Pink/red 01/26/21 1307 Drainage Amount None 01/26/21 1307 Wound Odor None 01/26/21 1307 Angela-Wound/Incision Assessment Dry/flaky; Other (Comment) 01/26/21 1307 Edges Flat/open edges 01/26/21 1307 Wound Thickness Description Partial thickness 01/26/21 1307 Number of days: 4 PLAN Skin Care & Pressure Relief Recommendations Speciality bed Total Care Bariatric bed w/air Minimize layers of linen Turn/reposition approximately every 2 hours Pillow wedges Manage incontinence Promote continence; Skin Protective lotion/cream to buttocks and sacrum daily and as needed with incontinence care Offloading boots Blood Glucose:  266 on 1/26/21 Albumin: 3.5 on 1/26/21 Physician/Provider notified:  
Recommendations:   Total Care Bariatric bed w/air for pressure reduction; heel boots for offloading heels to prevent skin injury; Medihoney and Mepilex daily for sacral wounds; and zinc paste TID for buttocks for skin barrier against  incontinence. Will continue to follow. Teaching completed with:  
[] Patient          
[] Family member      
[] Caregiver         
[] Nursing 
[] Other Patient/Caregiver Teaching: 
Level of patient/caregiver understanding able to:  
[] Indicates understanding       [] Needs reinforcement 
[] Unsuccessful      [] Verbal Understanding 
[] Demonstrated understanding       [] No evidence of learning 
[] Refused teaching         [] N/A Electronically signed by Hira Masters RN on 1/26/2021 at 1:22 PM

## 2021-01-26 NOTE — WOUND CARE
Called [employer] and requested delivery of a Total Care Bariatric bed w/air. The Bed will deliver today. Confirmation [id number]. Per Tee Brown with [employer], currently out of TC Bariatric beds with air in warehouse. Will check again in the morning. [facility] will delivery a P500 Low Air Loss bed instead of TC Bariatric bed.

## 2021-01-26 NOTE — DISCHARGE SUMMARY
Hospitalist Discharge Summary Patient ID: Wanda Carrington 
[id number] 
70 y.o. 
1940 1/22/2021 PCP on record: Kiara Diaz MD 
 
Admit date: 1/22/2021 Discharge date and time: 1/26/2021 DISCHARGE DIAGNOSIS: 
 
Acute respiratory failure with hypoxia/COVID PNA 
 
CONSULTATIONS: 
IP CONSULT TO HOSPITALIST 
IP CONSULT TO INFECTIOUS DISEASES 
IP CONSULT TO PULMONOLOGY Excerpted HPI from H&P of Taiwo Dela Cruz MD: Wanda Carrington is a 78 y.o. female who has past medical history of hypertension, diabetes, hyperlipidemia came to ER with a complaint of fever, generalized fatigue and flulike symptoms. Patient was also complaining of shortness of breath and cough. She called her primary care physician who advised her to come to the ER. On evaluation in ER patient was found to have Covid positive on screening. She was also found to be hypoxicOn room air requiring 5 L oxygen by nasal cannula. Hospitalist service was requested to admit the patient for further work-up and management. 
 
______________________________________________________________________ DISCHARGE SUMMARY/HOSPITAL COURSE:  for full details see H&P, daily progress notes, labs, consult notes. Patient was subsequently admitted to [facility] with a diagnosis of acute respiratory failure with hypoxia secondary to COVID-19 pneumonia, patient was evaluated by pulmonology as well as infectious disease, patient received steroids as well as antibiotics, following which patient's clinical status improved, patient was weaned off of oxygen, following which patient was evaluated by physical therapy as well as Occupational Therapy, subsequent to which patient was planned for discharge with close outpatient follow-up with primary care physician as well as pulmonology, of note patient to be discharged home with home health with resumption of physical therapy as well as Occupational Therapy as well as skilled nursing 
_______________________________________________________________________ 
Patient seen and examined by me on discharge day. 
Pertinent Findings: 
Gen:    Not in distress 
Chest: Decreased air entry bilaterally in bilateral lower lung zones 
CVS:   Regular rhythm. S1/s2 no m/r/g  No edema 
Abd:  Soft, not distended, not tender 
Neuro:  Alert, Oriented x 4 
_______________________________________________________________________ 
DISCHARGE MEDICATIONS:  
Current Discharge Medication List  
  
START taking these medications  
 Details  
budesonide-formoteroL (SYMBICORT) 160-4.5 mcg/actuation HFAA Take 2 Puffs by inhalation every twelve (12) hours every twelve (12) hours. 
Qty: 3 Inhaler, Refills: 0  
  
dexAMETHasone (DECADRON) 2 mg tablet Take 2 tablets daily for 4 days, Take 1 tablet daily for 4 days 
Qty: 12 Tab, Refills: 0  
  
  
CONTINUE these medications which have CHANGED  
 Details  
!! amLODIPine (NORVASC) 5 mg tablet Take 1 Tab by mouth daily. 
Qty: 30 Tab, Refills: 0  
  
atenoloL (TENORMIN) 50 mg tablet Take 1 Tab by mouth daily. 
Qty: 30 Tab, Refills: 0  
  
atorvastatin (LIPITOR) 40 mg tablet Take 1 Tab by mouth nightly. 
Qty: 30 Tab, Refills: 0  
  
clopidogreL (PLAVIX) 75 mg tab Take 1 Tab by mouth daily. 
Qty: 30 Tab, Refills: 0  
  
 !! - Potential duplicate medications found. Please discuss with provider.  
  
CONTINUE these medications which have NOT CHANGED  
 Details  
loratadine (CLARITIN) 10 mg tablet TAKE 1 TABLET BY MOUTH DAILY. 
Qty: 30 Tab, Refills: 5  
  
!! amLODIPine (NORVASC) 10 mg tablet TAKE ONE TABLET BY MOUTH DAILY. 
Qty: 90 Tab, Refills: 1  
  
omeprazole (PRILOSEC) 40 mg capsule TAKE 1 CAPSULE BY MOUTH ONCE DAILY 30 MINUTES BEFORE EATING. 
Qty: 90 Cap, Refills: 1  
  
losartan (COZAAR) 50 mg tablet Take 1 Tab by mouth daily.  
  
furosemide (LASIX) 40 mg tablet Take 1 Tab by mouth daily.  
  
glipiZIDE (GLUCOTROL) 10 mg tablet Take 10 mg by mouth two (2) times a day.  
   hydrALAZINE (APRESOLINE) 50 mg tablet Take 1 Tab by mouth two (2) times a day. Dose increased from 25 to 50 mg twice a day  Indications: high blood pressure Qty: 180 Tab, Refills: 1  
  
 !! - Potential duplicate medications found. Please discuss with provider. Patient Follow Up Instructions: Activity: PT/OT per Home Health Diet: Cardiac Diet Wound Care: None needed Follow-up with PCP/Pulmonology in 1 week. Follow-up tests/labs As per above physicians Follow-up Information Follow up With Specialties Details Why Contact Info Wang Watson MD Internal Medicine In 1 week  [address] [facility] [phone] Beny Calvert MD Pulmonary Disease In 1 week  [address] [place] [address] [phone] 
[phone] 
  
  
 
________________________________________________________________ Risk of deterioration: Low 
 
Condition at Discharge:  Stable 
__________________________________________________________________ Disposition Home with family and home health services 
 
____________________________________________________________________ Code Status: Full Code 
___________________________________________________________________ Total time in minutes spent coordinating this discharge (includes going over instructions, follow-up, prescriptions, and preparing report for sign off to her PCP) :  50 minutes Signed:  
Ashley Kelley MD

## 2021-01-28 PROBLEM — U07.1 COVID-19: Status: ACTIVE | Noted: 2021-01-01

## 2021-01-28 NOTE — ROUTINE PROCESS
TRANSFER - OUT REPORT: 
 
Verbal report given to naima on Ninoska Pratt  being transferred to  for routine progression of care Report consisted of patients Situation, Background, Assessment and  
Recommendations(SBAR). Information from the following report(s) SBAR was reviewed with the receiving nurse. Lines:    
 
Opportunity for questions and clarification was provided. Patient transported with: 
 [employer]

## 2021-01-28 NOTE — CONSULTS
Consult Pulmonary, Critical Care Name: Devon Bernstein MRN: [id number] : 1940 Hospital: [facility] Date: 2021  Admission date: 2021 Hospital Day: 1 Subjective/Interval History:  
Seen in the emergency room. Patient was just in the hospital last week with COVID-19 positive hypoxia pneumonitis. She was treated with Decadron Actemra and 1 dose ivermectin. She was weaned to room air with decreasing CRP at the time of discharge. She did well for the first 2 or 3 days but about 2 days ago started noticing worsening shortness of breath again. She had low-grade fever last night 99 4 came to the emergency room was found to have worsening hypoxia and is now on nonrebreather. Chest x-ray shows marked worsening diffuse infiltrates she states she has had some cough mostly nonproductive had nausea vomiting diarrhea states her appetite is unchanged and still has a sense of taste of interest her CRP at the time of discharge 0.87 and today 1.35 her blood cultures from  showed 4 of 4 with coagulase-negative staph aureus. She was an extremely difficult stick and it was felt that this was probably 1 stick in for blood culture bottles and represented skin contaminant. Blood culture from  at the time of discharge was no growth Hospital Problems  Date Reviewed: 10/6/2020 Codes Class Noted POA COVID-19 ICD-10-CM: U07.1 ICD-9-CM: 079.89  2021 Unknown IMPRESSION:  
1. Hypoxic respiratory failure 2. Diffuse pulmonary infiltrates pulmonary edema versus community-acquired pneumonia/COVID-19 pneumonitis 3. Recent COVID-19 infection 4. History CHF 5. History diabetes 6. Chronic kidney disease 7. Body mass index is 36.05 kg/m². 8.   
  
RECOMMENDATIONS/PLAN:  
1. Agree with your Decadron but will increase to every 6 hours dosing as well as adding Remdesivir 2. We will give 2 doses Lasix and follow renal function and urine output 3. Will place back on inhaled albuterol and Symbicort 4. [x] High complexity decision making was performed [x] See my orders for details Subjective/Initial History:  
 
I was asked by Tommy Davis MD to see Steph Murphy  a 78 y.o.  female in consultation for a chief complaint of hypoxic respiratory failure Covid pneumonitis versus pulmonary edema Allergies Allergen Reactions  Sulfa (Sulfonamide Antibiotics) Angioedema  Contrast Dye [Iodine] Itching MAR reviewed and pertinent medications noted or modified as needed Current Facility-Administered Medications Medication  sodium chloride (NS) flush 5-40 mL  sodium chloride (NS) flush 5-40 mL  enoxaparin (LOVENOX) injection 40 mg  
 acetaminophen (TYLENOL) tablet 650 mg Or  acetaminophen (TYLENOL) suppository 650 mg  cholecalciferol (VITAMIN D3) (1000 Units /25 mcg) tablet 2,000 Units  remdesivir 200 mg in 0.9% sodium chloride 250 mL IVPB Followed by  
Silvano Bean ON 1/29/2021] remdesivir 100 mg in 0.9% sodium chloride 250 mL IVPB  furosemide (LASIX) injection 60 mg  
 dexamethasone (DECADRON) 10 mg/mL injection 4 mg Current Outpatient Medications Medication Sig  
 amLODIPine (NORVASC) 5 mg tablet Take 1 Tab by mouth daily.  atenoloL (TENORMIN) 50 mg tablet Take 1 Tab by mouth daily.  atorvastatin (LIPITOR) 40 mg tablet Take 1 Tab by mouth nightly.  clopidogreL (PLAVIX) 75 mg tab Take 1 Tab by mouth daily.  budesonide-formoteroL (SYMBICORT) 160-4.5 mcg/actuation HFAA Take 2 Puffs by inhalation every twelve (12) hours every twelve (12) hours.  dexAMETHasone (DECADRON) 2 mg tablet Take 2 tablets daily for 4 days, Take 1 tablet daily for 4 days  insulin lispro (HUMALOG) 100 unit/mL injection INITIATE CORRECTIVE INSULIN PROTOCOL (TRUE): 
RX TRUE Normal Sensitivity (Average weight) AC (before meals), Q6H, and Q4H CORRECTIONAL SCALE only For Blood Sugar (mg/dl) of :            
140-199=2 units 200-249=3 units 250-299=5 units 300-349=7 units 350-399 = 10 units 400 or greater = Call MD 
Give in addition to basal medications. Do Not Hold for NPO BEDTIME CORRECTIONAL sliding scale when scheduled: 
200-249=2 units 250-299=3 units 300-349=4 units 350- 399 = 8 units 400 or greater = Call MD 
Give in addition to basal medications. Do Not Hold for NPO Fast Acting - Administer Immediately - or within 15 minutes of start of meal, if mealtime coverage.  loratadine (CLARITIN) 10 mg tablet TAKE 1 TABLET BY MOUTH DAILY.  omeprazole (PRILOSEC) 40 mg capsule TAKE 1 CAPSULE BY MOUTH ONCE DAILY 30 MINUTES BEFORE EATING.  losartan (COZAAR) 50 mg tablet Take 1 Tab by mouth daily.  furosemide (LASIX) 40 mg tablet Take 1 Tab by mouth daily.  glipiZIDE (GLUCOTROL) 10 mg tablet Take 10 mg by mouth two (2) times a day.  hydrALAZINE (APRESOLINE) 50 mg tablet Take 1 Tab by mouth two (2) times a day. Dose increased from 25 to 50 mg twice a day  Indications: high blood pressure Patient PCP: Marie Shields MD 
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 She also has no past medical history of History of abuse in adulthood or History of abuse in childhood. PSH:   has a past surgical history that includes hx hysterectomy. FHX: Family history is unknown by patient. SHX:  reports that she has quit smoking. She has never used smokeless tobacco. She reports that she does not drink alcohol or use drugs.  
  
Systemic review: 
General states her weight has been same for the last week just noticed fever yesterday with worsening shortness of breath Eyes no double vision or momentary blindness ENT no facial pain over the sinuses or drainage Endocrinologic she has diabetes denies polyuria polydipsia Musculoskeletal some muscle aches and pains unchanged from her baseline no swollen tender joints Neurologic no seizures or syncope Gastrointestinal no nausea or vomiting states her appetite has been unchanged and no change in her sense of taste Genitourinary no pain or discomfort on urination no bleeding Vascular denies ankle edema chest pain diaphoresis or nocturia Respiratory as mentioned worsening shortness of breath minimal cough no sputum production low-grade fever Objective:  
 
Vital Signs: Telemetry:    normal sinus rhythm Intake/Output:  
Visit Vitals /80 Pulse 72 Temp 98.3 °F (36.8 °C) Resp (!) 185 Ht 5' 4\" (1.626 m) Wt 95.3 kg (210 lb) SpO2 96% BMI 36.05 kg/m² Temp (24hrs), Av.8 °F (37.1 °C), Min:98.3 °F (36.8 °C), Max:99.3 °F (37.4 °C) O2 Device: Hi flow nasal cannula O2 Flow Rate (L/min): 15 l/min Wt Readings from Last 4 Encounters:  
21 95.3 kg (210 lb)  
21 127 kg (280 lb) No intake or output data in the 24 hours ending 21 1145 Last shift:      No intake/output data recorded. Last 3 shifts: No intake/output data recorded. Physical Exam:  
General:  female; moderate distress with tachypnea no accessory muscle use HEENT: NCAT, oral mucosa clear Eyes: anicteric; conjunctiva clear extraocular movements intact Neck: no nodes, obesity obscures determination of JVD Chest: no deformity,  
Cardiac: Regular rate and rhythm no definite murmur Lungs: Rapid shallow respirations clear anteriorly and laterally posterior rales are present Abd: Obese soft positive bowel sounds no tenderness Ext: no edema; no joint swelling; No clubbing : clear urine Neuro: Awake alert seems anxious speech is relatively clear is hemiplegic on the left side moves the right side normally Psych- no agitation, oriented to person;  
Skin: warm, dry, no cyanosis; 
Pulses: Brachial and radial pulses are intact hard to find her femoral due to her obesity Capillary: Capillary refill Labs: 
 
Recent Labs  
  01/28/21 
0615 01/26/21 
1155 WBC 11.7* 4.4 HGB 12.7 12.1  223 Recent Labs  
  01/28/21 
0615 01/26/21 
3903  139  
K 3.7 4.7  104 CO2 29 25 * 267* BUN 46* 38* CREA 1.47* 1.44* CA 9.5 9.9 PHOS  --  2.8 LAC 1.1  --   
ALB 3.5 3.5 ALT 22  --   
 
Nonrebreather mask with oxygen saturation 95%  CRP 1.35, 2.87, 4.87 
1/25  
1/25 ferritin 235 
1/20 444 blood cultures with coagulase-negative staph aureus 1/26 blood culture no growth Recent Labs  
  01/28/21 
5399 TROIQ <0.05 No results found for: BNPP, BNP Lab Results Component Value Date/Time Culture result: No growth 1 day 01/26/2021 11:55 AM  
 Culture result: (A) 01/22/2021 11:45 AM  
  Staphylococcus species, coagulase negative MULTIPLE COLONY TYPES/STRAINS growing in all 4 bottles drawn Culture result:  01/22/2021 11:45 AM  
  Gram pos cocci in clusters 
growing in 2 of 4 bottles drawn Dr. Magdaleno Siemens via Ms. Felisha Wallace on 1.23.21 at 15:25 by tlw Imaging: CXR Results  (Last 48 hours) 01/28/21 0610  XR CHEST PORT Final result Narrative:  1 view comparison 22nd Moderate patchy interstitial infiltrates, midportion of right greater than left  
lung with hypoinflation overall. No effusion or pneumothorax. Normal heart and  
Mediastinum Agree with above diffuse patchy infiltrates marked worsening from previous exam  
  
  
 
Results from [facility] [place] Encounter encounter on 01/28/21 XR CHEST PORT Narrative 1 view comparison 22nd Moderate patchy interstitial infiltrates, midportion of right greater than left 
lung with hypoinflation overall. No effusion or pneumothorax. Normal heart and 
mediastinum Results from Hospital Encounter encounter on 01/22/21 XR CHEST SNGL V  
 Narrative 1 new comparison March 10 Impression Impression: The cardiomediastinal silhouette is appropriate for age, technique, 
and lung expansion. Pulmonary vasculature is not congested. The lungs are 
essentially clear. No effusion or pneumothorax is seen. Results from [facility] Encounter encounter on 01/28/21 CT CHEST WO CONT Narrative Noncontrast study shows pronounced patchy variable density groundglass 
opacification and lesser consolidation throughout much of each lung, relatively 
sparing the right lower lobe. Central airways are open. No mediastinal or hilar 
adenopathy. Normal caliber aorta. No pleural pericardial effusion. No 
significant upper abdominal findings Impression Pronounced patchy diffuse bilateral pneumonitis · Discussion: Marked worsening hypoxia and pulmonary infiltrates. CRP is down which certainly goes against worsening Covid pneumonitis. Will give Lasix as mentioned above and follow oxygenation electrolytes and renal function. Agree with empiric treatment for Covid to include Decadron and remdesivir have spoken with infectious disease and will add vancomycin until repeat blood cultures are finalized · Time of care 50 minutes Jenae Skelton MD

## 2021-01-28 NOTE — PROGRESS NOTES
Consult for Vancomycin Dosing by Pharmacy by Dr. Abdoulaye Marquez Consult provided for this 78y.o. year old female , for indication of Bloodstream infection. Day of Therapy: 01 
Goal of Level(s): 15-20mcg/dL Other Current Antibiotics: Remdesivir Significant Cultures:  
    Date                             Culture                                       Organism 01/25                                Blood x4                              Staph, Coag Neg Results Procedure Component Value Units Date/Time CULTURE, BLOOD [[id number]] Collected: 01/28/21 0630 Order Status: Completed Specimen: Blood Updated: 01/28/21 1220 Special Requests: No Special Requests Culture result: No growth after 4 hours CULTURE, BLOOD, LINE [[id number]] Collected: 01/26/21 1155 Order Status: Completed Specimen: Blood Updated: 01/28/21 1220 Special Requests: No Special Requests Culture result: No growth 2 days CULTURE, BLOOD, PAIRED [[id number]]  (Abnormal) Collected: 01/22/21 1145 Order Status: Completed Specimen: Blood Updated: 01/25/21 1615 Special Requests: No Special Requests Culture result:    
  Staphylococcus species, coagulase negative MULTIPLE COLONY TYPES/STRAINS growing in all 4 bottles drawn Gram pos cocci in clusters 
growing in 2 of 4 bottles drawn Dr. Delmy George via Ms. Deborah Cisneros on 1.23.21 at 15:25 by tlw INFLUENZA A & B AG (RAPID TEST) [[id number]] Collected: 01/22/21 1123 Order Status: Completed Specimen: Nasopharyngeal from Nasal washing Updated: 01/22/21 1201 Influenza A Antigen Negative Influenza B Antigen Negative COVID-19 RAPID TEST [[id number]]  (Abnormal) Collected: 01/22/21 1115 Order Status: Completed Specimen: Nasopharyngeal Updated: 01/22/21 1205 Specimen source Nasopharyngeal     
  COVID-19 rapid test DETECTED   Comment: Rapid [employer] ID Now   The specimen is POSITIVE for SARS-CoV-2, the novel coronavirus associated with COVID-19. This test has been authorized by the FDA under an Emergency Use Authorization (EUA) for use by authorized laboratories. Fact sheet for Healthcare Providers: ConventionUpdate.co.nz Fact sheet for Patients: ConventionUpdate.co.nz   Methodology: Isothermal Nucleic Acid Amplification Results verified, phoned to and read back by Nicki Romo R.N.  
3301 01/22/21 Previous Regimen: pt was treated for Covid 19 New Regimen: Pt will receive one dose of Vanc 1g IV tonight. Random has been scheduled for tomorrow AM. Pharmacy to follow daily and will make changes to dose and/or frequency based on clinical status. _________________________________ Pharmacist Fredrica Phalen, PHARMD  
  
 
 
Submitted by: Fredrica Phalen, FRANCESD

## 2021-01-28 NOTE — H&P
History & Physical 
 
Primary Care Provider: Marla Howard MD 
Source of Information: Patient/family History of Presenting Illness:  
Magdalena Del Cid is a 78 y.o. female with a history of congestive heart failure, diabetes, CVA and hypertension who was diagnosed with COVID-19 last week. Patient was admitted from 1/22-1/26 With initial oxygen requirements of 5 L although it looks like she was weaned to room air by discharge 2 days ago. She was seen by ID during that hospitalization and was given a dose of ivermectin and Actemra. She was treated with Decadron but did not receive remdesivir because, as per ID note she only had minimal oxygen requirements This morning she called EMS for a temperature of 99.4. Her shortness of breath has been getting worse. She was found to have saturations in the low 80s on arrival of EMS. In the ED her temperature was 99.3, O2 saturations 87%, heart rate 75, respirations 20 Chest x-ray shows moderate patchy interstitial infiltrates bilateral.  These appear worse from 6 days ago Labs showed a WBC of 11.7, creatinine 1.47 which is her baseline renal function Patient is currently on 15 L high flow nasal oxygen with saturations of only 90% Review of Systems: A comprehensive review of systems was negative except for that written in the History of Present Illness. Past Medical History:  
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  
 History of CVA (cerebrovascular accident) 10/5/2020  Morbid obesity ([address] 10/5/2020  Pure hypercholesterolemia 10/5/2020  Urinary incontinence 10/5/2020 Past Surgical History:  
Procedure Laterality Date  HX HYSTERECTOMY Prior to Admission medications Medication Sig Start Date End Date Taking?  Authorizing Provider  
amLODIPine (NORVASC) 5 mg tablet Take 1 Tab by mouth daily. 1/27/21   Azam Valencia MD  
atenoloL (TENORMIN) 50 mg tablet Take 1 Tab by mouth daily. 1/27/21   Azam Valencia MD  
atorvastatin (LIPITOR) 40 mg tablet Take 1 Tab by mouth nightly. 1/26/21   Azam Valencia MD  
clopidogreL (PLAVIX) 75 mg tab Take 1 Tab by mouth daily. 1/27/21   Azam Valencia MD  
budesonide-formoteroL [facility]) 160-4.5 mcg/actuation HFAA Take 2 Puffs by inhalation every twelve (12) hours every twelve (12) hours. 1/26/21   Azam Valencia MD  
dexAMETHasone (DECADRON) 2 mg tablet Take 2 tablets daily for 4 days, Take 1 tablet daily for 4 days 1/26/21   Azam Valencia MD  
insulin lispro (HUMALOG) 100 unit/mL injection INITIATE CORRECTIVE INSULIN PROTOCOL (TRUE): 
RX TRUE Normal Sensitivity (Average weight) AC (before meals), Q6H, and Q4H CORRECTIONAL SCALE only For Blood Sugar (mg/dl) of :            
140-199=2 units 200-249=3 units 250-299=5 units 300-349=7 units 350-399 = 10 units 400 or greater = Call MD 
Give in addition to basal medications. Do Not Hold for NPO BEDTIME CORRECTIONAL sliding scale when scheduled: 
200-249=2 units 250-299=3 units 300-349=4 units 350- 399 = 8 units 400 or greater = Call MD 
Give in addition to basal medications. Do Not Hold for NPO Fast Acting - Administer Immediately - or within 15 minutes of start of meal, if mealtime coverage. 1/26/21   Azam Valencia MD  
lancets misc Use as directed 1/26/21   Azam Valencia MD  
Blood-Glucose Meter monitoring kit Use as directed 1/26/21   Azam Valencia MD  
glucose blood VI test strips (blood glucose test) strip Use as directed 1/26/21   Azam Valencia MD  
loratadine (CLARITIN) 10 mg tablet TAKE 1 TABLET BY MOUTH DAILY. 11/2/20   Lamar Pickard MD  
amLODIPine (NORVASC) 10 mg tablet TAKE ONE TABLET BY MOUTH DAILY. 10/11/20   Marla Howard MD  
omeprazole (PRILOSEC) 40 mg capsule TAKE 1 CAPSULE BY MOUTH ONCE DAILY 30 MINUTES BEFORE EATING. 10/11/20   Marla Howard MD  
losartan (COZAAR) 50 mg tablet Take 1 Tab by mouth daily. 8/25/17   Provider, Historical  
furosemide (LASIX) 40 mg tablet Take 1 Tab by mouth daily. 8/25/17   Provider, Historical  
glipiZIDE (GLUCOTROL) 10 mg tablet Take 10 mg by mouth two (2) times a day. Provider, Historical  
hydrALAZINE (APRESOLINE) 50 mg tablet Take 1 Tab by mouth two (2) times a day. Dose increased from 25 to 50 mg twice a day  Indications: high blood pressure 10/6/20   Marla Howard MD  
 
 
Allergies Allergen Reactions  Sulfa (Sulfonamide Antibiotics) Angioedema  Contrast Dye [Iodine] Itching Family History Family history unknown: Yes  
  
 
Social History Socioeconomic History  Marital status:  Spouse name: Not on file  Number of children: Not on file  Years of education: Not on file  Highest education level: Not on file Tobacco Use  Smoking status: Former Smoker  Smokeless tobacco: Never Used  Tobacco comment: QUIT 50 YEARS AGO Substance and Sexual Activity  Alcohol use: Never Frequency: Never  Drug use: Never CODE STATUS: 
DNR x Full Other Objective:  
 
Physical Exam:  
 
Visit Vitals /65 Pulse 75 Temp 99.3 °F (37.4 °C) Resp 20 Ht 5' 4\" (1.626 m) Wt 95.3 kg (210 lb) SpO2 93% BMI 36.05 kg/m² O2 Flow Rate (L/min): 15 l/min O2 Device: Hi flow nasal cannula General:  Alert, cooperative, no distress, appears stated age. Morbidly obese Head:  Normocephalic, without obvious abnormality, atraumatic. Eyes:  Conjunctivae/corneas clear. PERRL, EOMs intact. Nose: Nares normal. Septum midline. Mucosa normal. No drainage or sinus tenderness.   
Throat: Lips, mucosa, and tongue normal. Teeth and gums normal.  
Neck: Supple, symmetrical, trachea midline, no adenopathy, thyroid: no enlargement/tenderness/nodules, no carotid bruit and no JVD. Back:   Symmetric, no curvature. ROM normal. No CVA tenderness. Lungs:    Crackles bilateral  
Chest wall:  No tenderness or deformity. Heart:  Regular rate and rhythm, S1, S2 normal, no murmur, click, rub or gallop. Abdomen:   Soft, non-tender. Bowel sounds normal. No masses,  No organomegaly. Extremities: Extremities normal, atraumatic, no cyanosis or edema. Pulses: 2+ and symmetric all extremities. Skin: Skin color, texture, turgor normal. No rashes or lesions Neurologic: CNII-XII intact. No motor or sensory deficits. 24 Hour Results: 
 
Recent Results (from the past 24 hour(s)) CBC WITH AUTOMATED DIFF Collection Time: 01/28/21  6:15 AM  
Result Value Ref Range WBC 11.7 (H) 3.6 - 11.0 K/uL  
 RBC 4.80 3.80 - 5.20 M/uL  
 HGB 12.7 11.5 - 16.0 g/dL HCT 38.2 35.0 - 47.0 % MCV 79.6 (L) 80.0 - 99.0 FL  
 MCH 26.5 26.0 - 34.0 PG  
 MCHC 33.2 30.0 - 36.5 g/dL  
 RDW 14.9 (H) 11.5 - 14.5 % PLATELET 255 406 - 808 K/uL MPV 10.0 8.9 - 12.9 FL  
 NEUTROPHILS 86 (H) 32 - 75 % LYMPHOCYTES 11 (L) 12 - 49 % MONOCYTES 2 (L) 5 - 13 % EOSINOPHILS 0 0 - 7 % BASOPHILS 0 0 - 1 % IMMATURE GRANULOCYTES 1 (H) 0.0 - 0.5 % ABS. NEUTROPHILS 10.2 (H) 1.8 - 8.0 K/UL  
 ABS. LYMPHOCYTES 1.3 0.8 - 3.5 K/UL  
 ABS. MONOCYTES 0.2 0.0 - 1.0 K/UL  
 ABS. EOSINOPHILS 0.0 0.0 - 0.4 K/UL  
 ABS. BASOPHILS 0.0 0.0 - 0.1 K/UL  
 ABS. IMM. GRANS. 0.1 (H) 0.00 - 0.04 K/UL  
 DF AUTOMATED METABOLIC PANEL, COMPREHENSIVE Collection Time: 01/28/21  6:15 AM  
Result Value Ref Range Sodium 142 136 - 145 mmol/L Potassium 3.7 3.5 - 5.1 mmol/L Chloride 105 97 - 108 mmol/L  
 CO2 29 21 - 32 mmol/L Anion gap 8 5 - 15 mmol/L Glucose 130 (H) 65 - 100 mg/dL BUN 46 (H) 6 - 20 mg/dL Creatinine 1.47 (H) 0.55 - 1.02 mg/dL BUN/Creatinine ratio 31 (H) 12 - 20  GFR est AA 41 (L) >60 ml/min/1.73m2 GFR est non-AA 34 (L) >60 ml/min/1.73m2 Calcium 9.5 8.5 - 10.1 mg/dL Bilirubin, total 1.3 (H) 0.2 - 1.0 mg/dL AST (SGOT) 32 15 - 37 U/L  
 ALT (SGPT) 22 12 - 78 U/L Alk. phosphatase 68 45 - 117 U/L Protein, total 7.2 6.4 - 8.2 g/dL Albumin 3.5 3.5 - 5.0 g/dL Globulin 3.7 2.0 - 4.0 g/dL A-G Ratio 0.9 (L) 1.1 - 2.2    
TROPONIN I Collection Time: 01/28/21  6:15 AM  
Result Value Ref Range Troponin-I, Qt. <0.05 <0.05 ng/mL D DIMER Collection Time: 01/28/21  6:15 AM  
Result Value Ref Range D DIMER 3.22 (H) <0.50 ug/ml(FEU) LACTIC ACID Collection Time: 01/28/21  6:15 AM  
Result Value Ref Range Lactic acid 1.1 0.4 - 2.0 mmol/L Imaging: XR CHEST PORT Final Result Assessment:  
COVID-19 with pneumonitis, recently hospitalized for 5 days with improvement in hypoxia by day of discharge 2 days ago. Now with significantly worsened hypoxia Acute respiratory failure with hypoxia Diabetes mellitus type 2 COPD without exacerbation Chronic kidney disease stage III Chronic heart failure, details unknown History of CVA, wheelchair-bound Hypertension Plan:  
Admit to medical telemetry Check   procalcitonin, LDH, ferritin, CRP Obtain CT of chest 
We will treat with IV Decadron, azithromycin and remdesivir Pulmonology consultation, discussed with Dr. Erin Saucedo Patient requests DNR/DNI CODE STATUS Home medications were reviewed Signed By: Jane Pabon MD   
 January 28, 2021

## 2021-01-28 NOTE — ED TRIAGE NOTES
Covid positive. Called ems for elevated temp 99.4. sob for 2 weeks. Sats in low 80's increased to 90 with nasal cannula

## 2021-01-28 NOTE — CONSULTS
Infectious Disease Consult Note Reason for Consult: COVID-19 Date of Consultation: January 28, 2021 Date of Admission: 1/28/2021 Referring Physician: Dr Glass Memory HPI: Very pleasant 57-year-old black female recently admitted with COVID-19 pneumonitis. She did well, only required 2 L of supplemental oxygen which was weaned off to room air prior to discharge from the hospital.  She reports ongoing weakness and worsening dyspnea upon discharge home prompting her presentation to the ED today. Her medical history is significant for CHF, COPD CVA, bedbound CKD, obesity and DM. He is afebrile, medically stable and maintaining O2 sats on high flow. She received ivermectin, Actemra and Decadron during her last admission, was not treated with remdesivir. Staph epidermidis was isolated from [facility] on 01.26, deemed a contaminant, repeat BCx on 01/26 was negative. Today's labs show leukocytosis 11.7, Cr 1.47. CRP 1.35 and procal <0.05. CT chest shows b/l patchy infiltrates. Review of Systems: 
 
 Gen: Negative for chills, fevers, weight loss, weight gain CV:  Negative for chest pain, dyspnea on exertion, leg edema Lungs:  shortness of breath, cough Abdomen: Negative for abdominal pain, nausea, vomiting, diarrhea Genitourinary: Negative for genital pain or genital discharge Neuro: Negative for headache, extremity weakness Skin: Negative for rash, sores/open wounds Musculoskeletal: Negative for joint pain, joint swelling, joint erythema Psych: Negative for manic behavior Past Medical History: 
Past Medical History:  
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  
 History of CVA (cerebrovascular accident) 10/5/2020  Morbid obesity ([address] 10/5/2020  Pure hypercholesterolemia 10/5/2020  Urinary incontinence 10/5/2020 Past surgical history Past Surgical History:  
Procedure Laterality Date  HX HYSTERECTOMY Social History Social History Tobacco Use  Smoking status: Former Smoker  Smokeless tobacco: Never Used  Tobacco comment: QUIT 50 YEARS AGO Substance Use Topics  Alcohol use: Never Frequency: Never  Drug use: Never Family history Family History Family history unknown: Yes Allergies: Allergies Allergen Reactions  Sulfa (Sulfonamide Antibiotics) Angioedema  Contrast Dye [Iodine] Itching Medications: No current facility-administered medications on file prior to encounter. Current Outpatient Medications on File Prior to Encounter Medication Sig Dispense Refill  amLODIPine (NORVASC) 5 mg tablet Take 1 Tab by mouth daily. 30 Tab 0  
 atenoloL (TENORMIN) 50 mg tablet Take 1 Tab by mouth daily. 30 Tab 0  
 atorvastatin (LIPITOR) 40 mg tablet Take 1 Tab by mouth nightly. 30 Tab 0  clopidogreL (PLAVIX) 75 mg tab Take 1 Tab by mouth daily. 30 Tab 0  
 budesonide-formoteroL (SYMBICORT) 160-4.5 mcg/actuation HFAA Take 2 Puffs by inhalation every twelve (12) hours every twelve (12) hours. 3 Inhaler 0  
 dexAMETHasone (DECADRON) 2 mg tablet Take 2 tablets daily for 4 days, Take 1 tablet daily for 4 days 12 Tab 0  
 insulin lispro (HUMALOG) 100 unit/mL injection INITIATE CORRECTIVE INSULIN PROTOCOL (TRUE): 
RX TRUE Normal Sensitivity (Average weight) AC (before meals), Q6H, and Q4H CORRECTIONAL SCALE only For Blood Sugar (mg/dl) of :            
140-199=2 units 200-249=3 units 250-299=5 units 300-349=7 units 350-399 = 10 units 400 or greater = Call MD 
Give in addition to basal medications. Do Not Hold for NPO BEDTIME CORRECTIONAL sliding scale when scheduled: 
200-249=2 units 250-299=3 units 300-349=4 units 350- 399 = 8 units 400 or greater = Call MD 
Give in addition to basal medications.  
Do Not Hold for NPO Fast Acting - Rigoberto Higuera Immediately - or within 15 minutes of start of meal, if mealtime coverage. 1 Vial 0  
 loratadine (CLARITIN) 10 mg tablet TAKE 1 TABLET BY MOUTH DAILY. 30 Tab 5  
 omeprazole (PRILOSEC) 40 mg capsule TAKE 1 CAPSULE BY MOUTH ONCE DAILY 30 MINUTES BEFORE EATING. 90 Cap 1  
 losartan (COZAAR) 50 mg tablet Take 1 Tab by mouth daily.  furosemide (LASIX) 40 mg tablet Take 1 Tab by mouth daily.  glipiZIDE (GLUCOTROL) 10 mg tablet Take 10 mg by mouth two (2) times a day.  hydrALAZINE (APRESOLINE) 50 mg tablet Take 1 Tab by mouth two (2) times a day. Dose increased from 25 to 50 mg twice a day  Indications: high blood pressure 180 Tab 1 Physical Exam: 
 
Vitals:  
Patient Vitals for the past 24 hrs: 
 Temp Pulse Resp BP SpO2  
01/28/21 1241 98.4 °F (36.9 °C) 76 22 (!) 146/78 (!) 88 % 01/28/21 1120  72 (!) 185 120/80 96 % 01/28/21 1045  70 18 134/70 95 % 01/28/21 0945 98.3 °F (36.8 °C) 72 18 139/72 99 % 01/28/21 0620     93 % 01/28/21 0545 99.3 °F (37.4 °C) 75 20 132/65 (!) 87 % ·  
· GEN: NAD, AAO x 4, generalized weakness · HEENT: NCAT, PERRLA 
· HEART: S1, S2+, RRR, No murmur · Lungs: CTA B/l, no wheeze/rhonchi · Abdomen: soft, ND, NT, +BS · Genitourinary: no genital discharge, no piper · Extremities: trace edema 
· Skin: no chronic wounds or ulcers Labs:  
Recent Labs  
  01/28/21 
0615 01/26/21 
1155 WBC 11.7* 4.4 HGB 12.7 12.1 HCT 38.2 36.1  223 Recent Labs  
  01/28/21 
0615 01/26/21 
3865 BUN 46* 38* CREA 1.47* 1.44* Lab Results Component Value Date/Time C-Reactive protein 1.35 (H) 01/28/2021 09:55 AM  
 
 
Microbiology Data: - Blood Cx 01/22: Staph epi  
- Blood Cx 01/26: Neg  
- Blood Cx 01/28: Pending Imaging:  
CT chest01/28: Pronounced patchy diffuse bilateral pneumonitis Assessment / Plan: 1. Acute hypoxia due to COVID-19 pneumonitis, r/o CHF exacerbatio/CAP     Maintaining O2 sats on high flow O2, denies cough Received Ivermectin, decadron and Actemra during last admission Afebrile, moderate leukocytosis on routine labs, likely from steroid therapy Agree w Remdesivir and Decadron. Add Ceftriaxone empiric CAP coverage Routine labs and CXR in the morning 2. Staph epidermidis bacteremia: isolated from [facility] during last admission Repeat BC on 01/26 is neg. Continue on Vanc pending repeat BCx 3. H/o COPD: Not on home O2 
 
4. Suspected CHF exacerbation: BNP of 609, lasix ordered 5. H/o CVA w resulting ambulatory difficulty, wheel chair bound at home Dada Anderson MD  
 
1/28/2021

## 2021-01-28 NOTE — PROGRESS NOTES
Two person admission skin assessment performed by Samira Soriano RN and myself. IV noted in left AC. Sacral wound noted upon admission, covered with mepilex. All other skin clean, dry, and intact.

## 2021-01-28 NOTE — PROGRESS NOTES
Problem: Diabetes Self-Management Goal: *Disease process and treatment process Description: Define diabetes and identify own type of diabetes; list 3 options for treating diabetes. Outcome: Progressing Towards Goal 
Goal: *Incorporating nutritional management into lifestyle Description: Describe effect of type, amount and timing of food on blood glucose; list 3 methods for planning meals. Outcome: Progressing Towards Goal 
Goal: *Incorporating physical activity into lifestyle Description: State effect of exercise on blood glucose levels. Outcome: Progressing Towards Goal 
Goal: *Developing strategies to promote health/change behavior Description: Define the ABC's of diabetes; identify appropriate screenings, schedule and personal plan for screenings. Outcome: Progressing Towards Goal 
Goal: *Using medications safely Description: State effect of diabetes medications on diabetes; name diabetes medication taking, action and side effects. Outcome: Progressing Towards Goal 
Goal: *Monitoring blood glucose, interpreting and using results Description: Identify recommended blood glucose targets  and personal targets. Outcome: Progressing Towards Goal 
Goal: *Prevention, detection, treatment of acute complications Description: List symptoms of hyper- and hypoglycemia; describe how to treat low blood sugar and actions for lowering  high blood glucose level. Outcome: Progressing Towards Goal 
Goal: *Prevention, detection and treatment of chronic complications Description: Define the natural course of diabetes and describe the relationship of blood glucose levels to long term complications of diabetes. Outcome: Progressing Towards Goal 
Goal: *Developing strategies to address psychosocial issues Description: Describe feelings about living with diabetes; identify support needed and support network Outcome: Progressing Towards Goal 
Goal: *Insulin pump training Outcome: Progressing Towards Goal 
Goal: *Sick day guidelines Outcome: Progressing Towards Goal 
Goal: *Patient Specific Goal (EDIT GOAL, INSERT TEXT) Outcome: Progressing Towards Goal 
  
Problem: Patient Education: Go to Patient Education Activity Goal: Patient/Family Education Outcome: Progressing Towards Goal 
  
Problem: Falls - Risk of 
Goal: *Absence of Falls Description: Document Leafy Ventura Fall Risk and appropriate interventions in the flowsheet. Outcome: Progressing Towards Goal 
Note: Fall Risk Interventions: 
  
 
  
 
  
 
Elimination Interventions: Bed/chair exit alarm, Call light in reach, Toileting schedule/hourly rounds, Patient to call for help with toileting needs Problem: Patient Education: Go to Patient Education Activity Goal: Patient/Family Education Outcome: Progressing Towards Goal 
  
Problem: Pressure Injury - Risk of 
Goal: *Prevention of pressure injury Description: Document Sundeep Scale and appropriate interventions in the flowsheet. Outcome: Progressing Towards Goal 
Note: Pressure Injury Interventions: 
Sensory Interventions: Assess need for specialty bed, Keep linens dry and wrinkle-free, Maintain/enhance activity level, Minimize linen layers, Turn and reposition approx. every two hours (pillows and wedges if needed) Moisture Interventions: Absorbent underpads, Apply protective barrier, creams and emollients, Minimize layers, Maintain skin hydration (lotion/cream) Activity Interventions: PT/OT evaluation, Increase time out of bed, Assess need for specialty bed Mobility Interventions: HOB 30 degrees or less, Turn and reposition approx. every two hours(pillow and wedges), PT/OT evaluation Nutrition Interventions: Document food/fluid/supplement intake, Offer support with meals,snacks and hydration Friction and Shear Interventions: Apply protective barrier, creams and emollients, Minimize layers, HOB 30 degrees or less Problem: Patient Education: Go to Patient Education Activity Goal: Patient/Family Education Outcome: Progressing Towards Goal

## 2021-01-28 NOTE — ED PROVIDER NOTES
EMERGENCY DEPARTMENT HISTORY AND PHYSICAL EXAM 
 
 
Date: 1/28/2021 Patient Name: Ivana Guerrero History of Presenting Illness Chief Complaint Patient presents with  Respiratory Distress History Provided By: Patient HPI: Ivana Guerrero, 78 y.o. female with a past medical history significant diabetes, hypertension, hyperlipidemia, stroke and CHF presents to the ED with chief complaint of Respiratory Distress Nicholas Ricketts 49-year-old female history of congestive heart failure diabetes hypertension. She was diagnosed with Covid last week. She presents to the ER with increasing difficulty breathing lots of coughing and congestion. Low-grade fevers. No nausea vomiting. Is convinced her daughter is waiting for her outside the room. No falls or trauma. She was placed on oxygen which improved her oxygen from 80s to the low 90s. She states that she feels better. There are no other complaints, changes, or physical findings at this time. PCP: Albaro Borjas MD 
 
Current Facility-Administered Medications Medication Dose Route Frequency Provider Last Rate Last Admin  dexamethasone (PF) (DECADRON) 10 mg/mL injection 10 mg  10 mg IntraVENous ONCE Yuni Edwards MD      
 cefTRIAXone (ROCEPHIN) 1 g in sterile water (preservative free) 10 mL IV syringe  1 g IntraVENous NOW Yuni Edwards MD      
 azithromycin (ZITHROMAX) 500 mg in 0.9% sodium chloride 250 mL (VIAL-MATE)  500 mg IntraVENous NOW Yuni Edwards MD      
 
Current Outpatient Medications Medication Sig Dispense Refill  amLODIPine (NORVASC) 5 mg tablet Take 1 Tab by mouth daily. 30 Tab 0  
 atenoloL (TENORMIN) 50 mg tablet Take 1 Tab by mouth daily. 30 Tab 0  
 atorvastatin (LIPITOR) 40 mg tablet Take 1 Tab by mouth nightly. 30 Tab 0  clopidogreL (PLAVIX) 75 mg tab Take 1 Tab by mouth daily.  30 Tab 0  
 budesonide-formoteroL (SYMBICORT) 160-4.5 mcg/actuation HFAA Take 2 Puffs by inhalation every twelve (12) hours every twelve (12) hours. 3 Inhaler 0  
 dexAMETHasone (DECADRON) 2 mg tablet Take 2 tablets daily for 4 days, Take 1 tablet daily for 4 days 12 Tab 0  
 insulin lispro (HUMALOG) 100 unit/mL injection INITIATE CORRECTIVE INSULIN PROTOCOL (TRUE): 
RX TRUE Normal Sensitivity (Average weight) AC (before meals), Q6H, and Q4H CORRECTIONAL SCALE only For Blood Sugar (mg/dl) of :            
140-199=2 units 200-249=3 units 250-299=5 units 300-349=7 units 350-399 = 10 units 400 or greater = Call MD 
Give in addition to basal medications. Do Not Hold for NPO BEDTIME CORRECTIONAL sliding scale when scheduled: 
200-249=2 units 250-299=3 units 300-349=4 units 350- 399 = 8 units 400 or greater = Call MD 
Give in addition to basal medications. Do Not Hold for NPO Fast Acting - Administer Immediately - or within 15 minutes of start of meal, if mealtime coverage. 1 Vial 0  
 lancets misc Use as directed 1 Each 0  Blood-Glucose Meter monitoring kit Use as directed 1 Kit 0  
 glucose blood VI test strips (blood glucose test) strip Use as directed 100 Strip 0  
 loratadine (CLARITIN) 10 mg tablet TAKE 1 TABLET BY MOUTH DAILY. 30 Tab 5  
 amLODIPine (NORVASC) 10 mg tablet TAKE ONE TABLET BY MOUTH DAILY. 90 Tab 1  
 omeprazole (PRILOSEC) 40 mg capsule TAKE 1 CAPSULE BY MOUTH ONCE DAILY 30 MINUTES BEFORE EATING. 90 Cap 1  
 losartan (COZAAR) 50 mg tablet Take 1 Tab by mouth daily.  furosemide (LASIX) 40 mg tablet Take 1 Tab by mouth daily.  glipiZIDE (GLUCOTROL) 10 mg tablet Take 10 mg by mouth two (2) times a day.  hydrALAZINE (APRESOLINE) 50 mg tablet Take 1 Tab by mouth two (2) times a day. Dose increased from 25 to 50 mg twice a day  Indications: high blood pressure 180 Tab 1 Past History Past Medical History: 
Past Medical History:  
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  
 History of CVA (cerebrovascular accident) 10/5/2020  Morbid obesity ([facility] [address] 10/5/2020  Pure hypercholesterolemia 10/5/2020  Urinary incontinence 10/5/2020 Past Surgical History: 
Past Surgical History:  
Procedure Laterality Date  HX HYSTERECTOMY Family History: 
Family History Family history unknown: Yes  
 
 
Social History: 
Social History Tobacco Use  Smoking status: Former Smoker  Smokeless tobacco: Never Used  Tobacco comment: QUIT 50 YEARS AGO Substance Use Topics  Alcohol use: Never Frequency: Never  Drug use: Never Allergies: Allergies Allergen Reactions  Sulfa (Sulfonamide Antibiotics) Angioedema  Contrast Dye [Iodine] Itching Review of Systems Review of Systems Constitutional: Positive for chills, fatigue and fever. Negative for diaphoresis. HENT: Positive for congestion and sore throat. Negative for ear pain and nosebleeds. Eyes: Negative. Negative for pain, discharge and redness. Respiratory: Positive for cough and shortness of breath. Negative for chest tightness and wheezing. Cardiovascular: Negative. Negative for chest pain, palpitations and leg swelling. Gastrointestinal: Negative. Negative for abdominal pain, constipation, diarrhea, nausea and vomiting. Endocrine: Negative. Negative for cold intolerance. Genitourinary: Negative. Negative for difficulty urinating, dysuria and hematuria. Musculoskeletal: Negative. Negative for arthralgias, joint swelling and neck pain. Skin: Negative. Negative for color change, pallor, rash and wound. Allergic/Immunologic: Negative. Neurological: Negative. Negative for dizziness, syncope, weakness, light-headedness and headaches. Hematological: Negative. Does not bruise/bleed easily. Psychiatric/Behavioral: Negative.   Negative for behavioral problems, confusion and suicidal ideas. All other systems reviewed and are negative. Physical Exam  
Physical Exam 
Vitals signs and nursing note reviewed. Exam conducted with a chaperone present. Constitutional:   
   General: She is in acute distress. Appearance: Normal appearance. She is normal weight. She is ill-appearing. HENT:  
   Head: Normocephalic and atraumatic. Nose: Nose normal.  
   Mouth/Throat:  
   Mouth: Mucous membranes are moist.  
   Pharynx: Oropharynx is clear. Eyes:  
   Extraocular Movements: Extraocular movements intact. Conjunctiva/sclera: Conjunctivae normal.  
   Pupils: Pupils are equal, round, and reactive to light. Neck: Musculoskeletal: Normal range of motion and neck supple. Cardiovascular:  
   Rate and Rhythm: Normal rate and regular rhythm. Pulses: Normal pulses. Heart sounds: Normal heart sounds. Pulmonary:  
   Effort: Respiratory distress present. Breath sounds: Normal breath sounds. Abdominal:  
   General: Abdomen is flat. Bowel sounds are normal. There is no distension. Palpations: Abdomen is soft. Tenderness: There is no abdominal tenderness. There is no guarding. Musculoskeletal: Normal range of motion. General: No swelling, tenderness, deformity or signs of injury. Right lower leg: No edema. Left lower leg: No edema. Skin: 
   General: Skin is warm and dry. Capillary Refill: Capillary refill takes less than 2 seconds. Findings: No lesion or rash. Neurological:  
   General: No focal deficit present. Mental Status: She is alert and oriented to person, place, and time. Mental status is at baseline. Cranial Nerves: No cranial nerve deficit. Psychiatric:     
   Mood and Affect: Mood normal.     
   Behavior: Behavior normal.     
   Thought Content: Thought content normal.     
   Judgment: Judgment normal.  
 
 
 
Diagnostic Study Results Labs - Recent Results (from the past 12 hour(s)) CBC WITH AUTOMATED DIFF Collection Time: 01/28/21  6:15 AM  
Result Value Ref Range WBC 11.7 (H) 3.6 - 11.0 K/uL  
 RBC 4.80 3.80 - 5.20 M/uL  
 HGB 12.7 11.5 - 16.0 g/dL HCT 38.2 35.0 - 47.0 % MCV 79.6 (L) 80.0 - 99.0 FL  
 MCH 26.5 26.0 - 34.0 PG  
 MCHC 33.2 30.0 - 36.5 g/dL  
 RDW 14.9 (H) 11.5 - 14.5 % PLATELET 857 651 - 373 K/uL MPV 10.0 8.9 - 12.9 FL  
 NEUTROPHILS 86 (H) 32 - 75 % LYMPHOCYTES 11 (L) 12 - 49 % MONOCYTES 2 (L) 5 - 13 % EOSINOPHILS 0 0 - 7 % BASOPHILS 0 0 - 1 % IMMATURE GRANULOCYTES 1 (H) 0.0 - 0.5 % ABS. NEUTROPHILS 10.2 (H) 1.8 - 8.0 K/UL  
 ABS. LYMPHOCYTES 1.3 0.8 - 3.5 K/UL  
 ABS. MONOCYTES 0.2 0.0 - 1.0 K/UL  
 ABS. EOSINOPHILS 0.0 0.0 - 0.4 K/UL  
 ABS. BASOPHILS 0.0 0.0 - 0.1 K/UL  
 ABS. IMM. GRANS. 0.1 (H) 0.00 - 0.04 K/UL  
 DF AUTOMATED METABOLIC PANEL, COMPREHENSIVE Collection Time: 01/28/21  6:15 AM  
Result Value Ref Range Sodium 142 136 - 145 mmol/L Potassium 3.7 3.5 - 5.1 mmol/L Chloride 105 97 - 108 mmol/L  
 CO2 29 21 - 32 mmol/L Anion gap 8 5 - 15 mmol/L Glucose 130 (H) 65 - 100 mg/dL BUN 46 (H) 6 - 20 mg/dL Creatinine 1.47 (H) 0.55 - 1.02 mg/dL BUN/Creatinine ratio 31 (H) 12 - 20 GFR est AA 41 (L) >60 ml/min/1.73m2 GFR est non-AA 34 (L) >60 ml/min/1.73m2 Calcium 9.5 8.5 - 10.1 mg/dL Bilirubin, total 1.3 (H) 0.2 - 1.0 mg/dL AST (SGOT) 32 15 - 37 U/L  
 ALT (SGPT) 22 12 - 78 U/L Alk. phosphatase 68 45 - 117 U/L Protein, total 7.2 6.4 - 8.2 g/dL Albumin 3.5 3.5 - 5.0 g/dL Globulin 3.7 2.0 - 4.0 g/dL A-G Ratio 0.9 (L) 1.1 - 2.2 BNP Collection Time: 01/28/21  6:15 AM  
Result Value Ref Range NT pro- (H) <450 pg/mL TROPONIN I Collection Time: 01/28/21  6:15 AM  
Result Value Ref Range Troponin-I, Qt. <0.05 <0.05 ng/mL D DIMER Collection Time: 01/28/21  6:15 AM  
Result Value Ref Range D DIMER 3.22 (H) <0.50 ug/ml(FEU) LACTIC ACID  Collection Time: 01/28/21  6:15 AM  
Result Value Ref Range Lactic acid 1.1 0.4 - 2.0 mmol/L Radiologic Studies -  
XR CHEST PORT Final Result CT Results  (Last 48 hours) None CXR Results  (Last 48 hours) 01/28/21 0610  XR CHEST PORT Final result Narrative:  1 view comparison 22nd Moderate patchy interstitial infiltrates, midportion of right greater than left  
lung with hypoinflation overall. No effusion or pneumothorax. Normal heart and  
mediastinum Medical Decision Making and ED Course I am the first provider for this patient. I reviewed the vital signs, available nursing notes, past medical history, past surgical history, family history and social history. Vital Signs-Reviewed the patient's vital signs. Patient Vitals for the past 12 hrs: 
 Temp Pulse Resp BP SpO2  
01/28/21 0620     93 % 01/28/21 0545 99.3 °F (37.4 °C) 75 20 132/65 (!) 87 % EKG interpretation:  
EKG at 6:32 AM.  Normal sinus rhythm rate of 70. No ST changes. No T wave inversions. Normal intervals. Reason rule out ACS. Interpreted by ER physician. Records Reviewed: Previous Hospital chart. EMS run report ED Course:  
Initial assessment performed. The patients presenting problems have been discussed, and they are in agreement with the care plan formulated and outlined with them. I have encouraged them to ask questions as they arise throughout their visit. Orders Placed This Encounter  CULTURE, BLOOD Standing Status:   Standing Number of Occurrences:   1  XR CHEST PORT Standing Status:   Standing Number of Occurrences:   1 Order Specific Question:   Reason for Exam  
  Answer:   sob  CBC WITH AUTOMATED DIFF Standing Status:   Standing Number of Occurrences:   1  METABOLIC PANEL, COMPREHENSIVE Standing Status:   Standing Number of Occurrences:   1  BNP Standing Status:   Standing   Number of Occurrences:   1  TROPONIN I Standing Status:   Standing Number of Occurrences:   1  D DIMER Standing Status:   Standing Number of Occurrences:   1  
 LACTIC ACID Standing Status:   Standing Number of Occurrences:   1  
 NOTIFY PROVIDER: SPECIFY Notify provider on pt's arrival to floor ONE TIME STAT Standing Status:   Standing Number of Occurrences:   1 Order Specific Question:   Please describe the test or procedure you would like to order. Answer:   Notify provider on pt's arrival to floor  Droplet Plus Standing Status:   Standing Number of Occurrences:   1  RT--OXYGEN HIGH FLOW Patient is on a high flow bottle with 15 liters of flow Standing Status:   Standing Number of Occurrences:   1 Order Specific Question:   Liters per minute: Answer:   15 Order Specific Question:   Indications for O2 therapy Answer:   HYPOXIA Order Specific Question:   Oxygen Heated? Answer:   No  
 EKG 12 LEAD INITIAL Standing Status:   Standing Number of Occurrences:   1 Order Specific Question:   Reason for Exam: Answer:   Patrick Bennett  dexamethasone (PF) (DECADRON) 10 mg/mL injection 10 mg  
 cefTRIAXone (ROCEPHIN) 1 g in sterile water (preservative free) 10 mL IV syringe Order Specific Question:   Antibiotic Indications Answer:   Pneumonia (HAP)  azithromycin (ZITHROMAX) 500 mg in 0.9% sodium chloride 250 mL (VIAL-MATE) Order Specific Question:   Antibiotic Indications Answer:   Pneumonia (CAP)  IP CONSULT TO HOSPITALIST Standing Status:   Standing Number of Occurrences:   1 Order Specific Question:   Reason for Consult: Answer:   TO ADMIT Order Specific Question:   Did you call or speak to the consulting provider? Answer: Yes CONSULTANTS: 
Consults 8:01 AM jonn admit Provider Notes (Medical Decision Making):  
47-year-old with a history of congestive heart failure diabetes hypertension known Covid positive diagnosed 1 week ago presents with increasing cough congestion shortness of breath and in respiratory distress. Sats are low in the 80s. Responding nicely to nasal cannula oxygen and says she feels better. Plan for admission Procedures CRITICAL CARE NOTE : 
7:32 AM 
Amount of Critical Care Time: 45 minutes IMPENDING DETERIORATION -Airway, Respiratory and Cardiovascular ASSOCIATED RISK FACTORS - Shock and Hypoxia MANAGEMENT- Bedside Assessment and Supervision of Care INTERPRETATION -  Xrays, Blood Gases and Blood Pressure INTERVENTIONS - hemodynamic mngmt CASE REVIEW - Hospitalist 
TREATMENT RESPONSE -Improved PERFORMED BY - Self NOTES   : 
I have spent critical care time involved in lab review, consultations with specialist, family decision- making, bedside attention and documentation. This time excludes time spent in any separate billed procedures. During this entire length of time I was immediately available to the patient . Allyn Ramsay MD 
 
 
 
 
 
Disposition Emergency Department Disposition:  Admitted Diagnosis Clinical Impression: 1. COVID-19 virus infection 2. Hypoxia 3. Pneumonia due to infectious organism, unspecified laterality, unspecified part of lung 4. JOSE LUIS (acute kidney injury) ([facility] [address] 5. Elevated d-dimer Attestations: 
 
Allyn Ramsay MD 
 
Please note that this dictation was completed with [employer], the computer voice recognition software. Quite often unanticipated grammatical, syntax, homophones, and other interpretive errors are inadvertently transcribed by the computer software. Please disregard these errors. Please excuse any errors that have escaped final proofreading. Thank you.

## 2021-01-29 NOTE — PROGRESS NOTES
Pt hs pulled off bipap twice. SpO2 drops into the 60s-70s. Refuses to wear bipap and stated \"I would rather die than wear that mask again\". Pt. Placed on hi-flow nasal cannula at 60L/min 100% FiO2. SpO2 in low 80s. Administering PRN Toradol for pain/anxiety. Will continue to monitor. Dr. Ana Gill aware.

## 2021-01-29 NOTE — PROGRESS NOTES
Responded to machine alarms in room; hi-flow oxygen was pulled off, SpO2 monitor showed 42%. Patient confused/sleepy. Stated \"I didn't know it was off\". Replaced hi-flow nasal cannula and remained with pt until stable. Increased oxygen flow to 65L/min. Current SpO2 89% & pt. Resting quietly. Will continue to monitor.

## 2021-01-29 NOTE — PROGRESS NOTES
Hospitalist Progress Note Daily Progress Note: 1/29/2021 Subjective: The patient is seen for follow up. Patient is now on standard high flow oxygen with an FiO2 of 100%. Oxygen saturations are 94% on this Ferritin is 333, , CRP 1.35, procalcitonin negative Problem List: 
Problem List as of 1/29/2021 Date Reviewed: 10/6/2020 Codes Class Noted - Resolved COVID-19 ICD-10-CM: U07.1 ICD-9-CM: 079.89  1/28/2021 - Present Acute respiratory failure due to COVID-19 [facility]) ICD-10-CM: U07.1, J96.00 
ICD-9-CM: 518.81, 079.89  1/22/2021 - Present Vitamin D deficiency ICD-10-CM: E55.9 ICD-9-CM: 268.9  10/6/2020 - Present History of CVA (cerebrovascular accident) ICD-10-CM: Z86.73 
ICD-9-CM: V12.54  10/5/2020 - Present Essential hypertension ICD-10-CM: I10 
ICD-9-CM: 401.9  10/5/2020 - Present Dependence on wheelchair ICD-10-CM: Z99.3 ICD-9-CM: V46.3  10/5/2020 - Present Morbid obesity ([facility] ICD-10-CM: E66.01 
ICD-9-CM: 278.01  10/5/2020 - Present Pure hypercholesterolemia ICD-10-CM: E78.00 ICD-9-CM: 272.0  10/5/2020 - Present Urinary incontinence ICD-10-CM: R32 
ICD-9-CM: 788.30  10/5/2020 - Present DM (diabetes mellitus), type 2 ([facility] ICD-10-CM: E11.9 ICD-9-CM: 250.00  10/5/2020 - Present Medications reviewed Current Facility-Administered Medications Medication Dose Route Frequency  sodium chloride (NS) flush 5-40 mL  5-40 mL IntraVENous Q8H  
 sodium chloride (NS) flush 5-40 mL  5-40 mL IntraVENous PRN  
 enoxaparin (LOVENOX) injection 40 mg  40 mg SubCUTAneous Q12H  
 acetaminophen (TYLENOL) tablet 650 mg  650 mg Oral Q6H PRN Or  
 acetaminophen (TYLENOL) suppository 650 mg  650 mg Rectal Q6H PRN  cholecalciferol (VITAMIN D3) (1000 Units /25 mcg) tablet 2,000 Units  2,000 Units Oral DAILY  remdesivir 100 mg in 0.9% sodium chloride 250 mL IVPB  100 mg IntraVENous Q24H  albuterol (PROVENTIL HFA, VENTOLIN HFA, PROAIR HFA) inhaler 2 Puff  2 Puff Inhalation Q6H RT  
 budesonide-formoteroL (SYMBICORT) 160-4.5 mcg/actuation HFA inhaler 2 Puff  2 Puff Inhalation BID RT  
 VANCOMYCIN INFORMATION NOTE 1 Each  1 Each Other Rx Dosing/Monitoring  dexamethasone (DECADRON) 4 mg/mL injection 4 mg  4 mg IntraVENous Q6H  
 cefTRIAXone (ROCEPHIN) 1 g in sterile water (preservative free) 10 mL IV syringe  1 g IntraVENous Q24H Review of Systems: A comprehensive review of systems was negative except for that written in the HPI. Objective:  
Physical Exam:  
 
Visit Vitals BP (!) 157/67 (BP 1 Location: Right lower arm, BP Patient Position: Sitting) Pulse 84 Temp 98.5 °F (36.9 °C) Resp 24 Ht 5' 4\" (1.626 m) Wt 95.3 kg (210 lb) SpO2 94% BMI 36.05 kg/m² O2 Flow Rate (L/min): 60 l/min O2 Device: Heated, Hi flow nasal cannula Temp (24hrs), Av.3 °F (36.8 °C), Min:97.7 °F (36.5 °C), Max:98.5 °F (36.9 °C) No intake/output data recorded.  1901 -  0700 In: -  
Out: 1150 [[id number]] General:   Awake and alert. High flow nasal cannula in place Lungs:    Crackles bilateral  
Chest wall:  No tenderness or deformity. Heart:  Regular rate and rhythm, S1, S2 normal, no murmur, click, rub or gallop. Abdomen:   Soft, non-tender. Bowel sounds normal. No masses,  No organomegaly. Extremities: Extremities normal, atraumatic, no cyanosis or edema. Pulses: 2+ and symmetric all extremities. Skin: Skin color, texture, turgor normal. No rashes or lesions Neurologic: CNII-XII intact. No gross focal deficits Data Review:  
   
Recent Days: 
Recent Labs  
  21 
0615 21 
1155 WBC 11.7* 4.4 HGB 12.7 12.1 HCT 38.2 36.1  223 Recent Labs  
  21 
9580   
K 3.7  CO2 29 * BUN 46* CREA 1.47* CA 9.5 ALB 3.5 TBILI 1.3* ALT 22 No results for input(s): PH, PCO2, PO2, HCO3, FIO2 in the last 72 hours. 24 Hour Results: 
Recent Results (from the past 24 hour(s)) FERRITIN Collection Time: 01/28/21 11:30 AM  
Result Value Ref Range Ferritin 333 (H) 8 - 252 ng/mL LD Collection Time: 01/28/21 11:30 AM  
Result Value Ref Range  (H) 81 - 246 U/L  
GLUCOSE, POC Collection Time: 01/28/21 12:37 PM  
Result Value Ref Range Glucose (POC) 251 (H) 65 - 100 mg/dL Performed by Latha Torres GLUCOSE, POC Collection Time: 01/28/21  2:55 PM  
Result Value Ref Range Glucose (POC) 232 (H) 65 - 100 mg/dL Performed by Latha Torres GLUCOSE, POC Collection Time: 01/28/21  7:44 PM  
Result Value Ref Range Glucose (POC) 294 (H) 65 - 100 mg/dL Performed by [address] [place] [address] Final Result CT CHEST WO CONT Final Result Pronounced patchy diffuse bilateral pneumonitis XR CHEST PORT Final Result Assessment: 
COVID-19 with pneumonitis, recently hospitalized for 5 days with improvement in hypoxia by day of discharge 2 days ago. Now with significantly worsened hypoxia 
  
Acute respiratory failure with hypoxia 
  
Diabetes mellitus type 2 
  
COPD without exacerbation 
  
Chronic kidney disease stage III 
  
Chronic heart failure, details unknown 
  
History of CVA, wheelchair-bound 
  
Hypertension Plan: 
Continue IV remdesivir, Decadron, IV antibiotics Discussed with pulmonologist 
 
I updated her family yesterday CODE STATUS is DNR Total time spent with patient: 30 minutes.  
 
Tona Kussmaul, MD

## 2021-01-29 NOTE — PROGRESS NOTES
Infectious Disease Progress Note Subjective:  
Pt seen and examined at bedside. Remains on high flow O2, denies new complaints. No acute events since last seen Objective:  
Physical Exam:  
 
Visit Vitals BP (!) 155/84 (BP 1 Location: Right lower arm, BP Patient Position: Supine) Pulse 79 Temp 97.4 °F (36.3 °C) Resp 28 Ht 5' 4\" (1.626 m) Wt 210 lb (95.3 kg) SpO2 94% BMI 36.05 kg/m² O2 Flow Rate (L/min): 75 l/min O2 Device: Heated, Hi flow nasal cannula Temp (24hrs), Av.1 °F (36.7 °C), Min:97.4 °F (36.3 °C), Max:98.5 °F (36.9 °C) No intake/output data recorded.  1901 -  0700 In: -  
Out: 1150 [[facility]:[id number]] General: NAD, alert, AAO x 4, very weak HEENT: NAILA, Moist mucosa Lungs: Decreased at the bases no wheeze/rhonchi Heart: S1S2+, RRR, no murmur Abdo: Soft, NT, ND, +BS Exts: No edema, + pulses b/l  
Skin: No wounds, No rashes or lesions Data Review:  
   
Recent Days: 
Recent Labs  
  21 
0615 WBC 11.7* HGB 12.7 HCT 38.2  Recent Labs  
  21 
0133 BUN 46* CREA 1.47* Lab Results Component Value Date/Time C-Reactive protein 1.35 (H) 2021 09:55 AM  
  
Microbiology - Blood Cx : Neg  
 
Diagnostics CXR Results  (Last 48 hours) 21 0856  XR CHEST PORT Final result Narrative:  1 view comparison yesterday Little change in patchy mixed infiltrates throughout each lung, lower volumes  
overall. No effusion. Normal heart and mediastinum 21 0610  XR CHEST PORT Final result Narrative:  1 view comparison  Moderate patchy interstitial infiltrates, midportion of right greater than left  
lung with hypoinflation overall. No effusion or pneumothorax. Normal heart and  
mediastinum Assessment/Plan 1. Acute hypoxia due to COVID-19 pneumonitis, suspected CHF and CAP     Remains on high flow O2, denies productive cough Patchy infiltrate on CXR, no effusion On day # 2 of decadron and Remdesivir. S/p Azithromycin during last admit Routine labs in the morning. Pt is a difficult stick, may need mid line placement for infusions and blood draw  
  
2. Staph epidermidis bacteremia: isolated from [facility] during last admission Repeat BC on 01/26 and 01/28 are neg If blood CX from 01/28 remains neg x 72 hours rec stopping Vanc  
  
3. Suspected CHF exacerbation: No pulmonary edema on CXR or peripherally BNP of 609, remains on IV lasix  
  
4. H/o CVA w resulting ambulatory difficulty, wheel chair bound at home  
  
Nery Osborn MD 
 
1/29/2021

## 2021-01-29 NOTE — CONSULTS
Consult Pulmonary, Critical Care Name: Devon Bernstein MRN: [id number] : 1940 Hospital: [address] [place] [address] Date: 2021  Admission date: 2021 Hospital Day: 2 Subjective/Interval History:  
Seen in the emergency room. Patient was just in the hospital last week with COVID-19 positive hypoxia pneumonitis. She was treated with Decadron Actemra and 1 dose ivermectin. She was weaned to room air with decreasing CRP at the time of discharge. She did well for the first 2 or 3 days but about 2 days ago started noticing worsening shortness of breath again. She had low-grade fever last night 99 4 came to the emergency room was found to have worsening hypoxia and is now on nonrebreather. Chest x-ray shows marked worsening diffuse infiltrates she states she has had some cough mostly nonproductive had nausea vomiting diarrhea states her appetite is unchanged and still has a sense of taste of interest her CRP at the time of discharge 0.87 and today 1.35 her blood cultures from  showed 4 of 4 with coagulase-negative staph aureus. She was an extremely difficult stick and it was felt that this was probably 1 stick in for blood culture bottles and represented skin contaminant. Blood culture from  at the time of discharge was no growth  progressively worsening hypoxia had to place on 100% nasal high flow. She now is running oxygen saturation of 88%. High flow rate increased to 70 L and saturation only 90% Hospital Problems  Date Reviewed: 10/6/2020 Codes Class Noted POA COVID-19 ICD-10-CM: U07.1 ICD-9-CM: 079.89  2021 Unknown IMPRESSION:  
1. Hypoxic respiratory failure 2. Diffuse pulmonary infiltrates pulmonary edema versus community-acquired pneumonia/COVID-19 pneumonitis 3. Recent COVID-19 infection 4. History CHF 5. History diabetes 6. Chronic kidney disease Body mass index is 36.05 kg/m². 7.   
  
RECOMMENDATIONS/PLAN:  
1. Continue Decadron and remdesivir 2. We will repeat Lasix 3. Will place back on inhaled albuterol and Symbicort 4. [x] High complexity decision making was performed [x] See my orders for details Subjective/Initial History:  
 
I was asked by Purnima Brady MD to see Keeley Tanner  a 78 y.o.  female in consultation for a chief complaint of hypoxic respiratory failure Covid pneumonitis versus pulmonary edema Allergies Allergen Reactions  Sulfa (Sulfonamide Antibiotics) Angioedema  Contrast Dye [Iodine] Itching MAR reviewed and pertinent medications noted or modified as needed Current Facility-Administered Medications Medication  insulin lispro (HUMALOG) injection  glucose chewable tablet 16 g  
 glucagon (GLUCAGEN) injection 1 mg  dextrose (D50W) injection syrg 12.5-25 g  furosemide (LASIX) injection 60 mg  
 ketorolac (TORADOL) tablet 10 mg  
 sodium chloride (NS) flush 5-40 mL  sodium chloride (NS) flush 5-40 mL  enoxaparin (LOVENOX) injection 40 mg  
 acetaminophen (TYLENOL) tablet 650 mg Or  acetaminophen (TYLENOL) suppository 650 mg  cholecalciferol (VITAMIN D3) (1000 Units /25 mcg) tablet 2,000 Units  remdesivir 100 mg in 0.9% sodium chloride 250 mL IVPB  albuterol (PROVENTIL HFA, VENTOLIN HFA, PROAIR HFA) inhaler 2 Puff  budesonide-formoteroL (SYMBICORT) 160-4.5 mcg/actuation HFA inhaler 2 Puff  VANCOMYCIN INFORMATION NOTE 1 Each  
 dexamethasone (DECADRON) 4 mg/mL injection 4 mg Patient PCP: Karla Torres MD 
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  She also has no past medical history of History of abuse in adulthood or History of abuse in childhood. PSH:   has a past surgical history that includes hx hysterectomy. FHX: Family history is unknown by patient. SHX:  reports that she has quit smoking. She has never used smokeless tobacco. She reports that she does not drink alcohol or use drugs. Systemic review: 
General states her weight has been same for the last week just noticed fever yesterday with worsening shortness of breath Eyes no double vision or momentary blindness ENT no facial pain over the sinuses or drainage Endocrinologic she has diabetes denies polyuria polydipsia Musculoskeletal some muscle aches and pains unchanged from her baseline no swollen tender joints Neurologic no seizures or syncope Gastrointestinal no nausea or vomiting states her appetite has been unchanged and no change in her sense of taste Genitourinary no pain or discomfort on urination no bleeding Vascular denies ankle edema chest pain diaphoresis or nocturia Respiratory as mentioned worsening shortness of breath minimal cough no sputum production low-grade fever Objective:  
 
Vital Signs: Telemetry:    normal sinus rhythm Intake/Output:  
Visit Vitals BP (!) 155/84 (BP 1 Location: Right lower arm, BP Patient Position: Supine) Pulse 79 Temp 97.4 °F (36.3 °C) Resp 28 Ht 5' 4\" (1.626 m) Wt 95.3 kg (210 lb) SpO2 94% BMI 36.05 kg/m² Temp (24hrs), Av.1 °F (36.7 °C), Min:97.4 °F (36.3 °C), Max:98.5 °F (36.9 °C) O2 Device: Heated, Hi flow nasal cannula O2 Flow Rate (L/min): 75 l/min Wt Readings from Last 4 Encounters:  
21 95.3 kg (210 lb)  
21 127 kg (280 lb) Intake/Output Summary (Last 24 hours) at 2021 1502 Last data filed at 2021 0220 Gross per 24 hour Intake  Output 1150 ml Net -1150 ml Last shift:      No intake/output data recorded. Last 3 shifts:  1901 -  0700 In: -  
Out: 1150 [[id number]] Physical Exam:  
General:  female; moderate distress with tachypnea no accessory muscle use HEENT: NCAT, oral mucosa clear Eyes: anicteric; conjunctiva clear extraocular movements intact Neck: no nodes, obesity obscures determination of JVD Chest: no deformity,  
Cardiac: Regular rate and rhythm no definite murmur Lungs: Rapid shallow respirations clear anteriorly and laterally posterior rales are present Abd: Obese soft positive bowel sounds no tenderness Ext: no edema; no joint swelling; No clubbing : clear urine Neuro: Awake alert seems anxious speech is relatively clear is hemiplegic on the left side moves the right side normally Psych- no agitation, oriented to person;  
Skin: warm, dry, no cyanosis; 
Pulses: Brachial and radial pulses are intact hard to find her femoral due to her obesity Capillary: Capillary refill Labs: 
 
Recent Labs  
  01/28/21 
8538 WBC 11.7* HGB 12.7  Recent Labs  
  01/28/21 
3702   
K 3.7  CO2 29 * BUN 46* CREA 1.47* CA 9.5 LAC 1.1 ALB 3.5 ALT 22 Nasal high flow 100% FiO2 with flow rate 70 L/min with oxygen saturation 98%  CRP 1.35, 2.87, 4.87 
  280 
 ferritin 333, 235 
1/20 4of4 blood cultures with coagulase-negative staph aureus 1/26 blood culture no growth Recent Labs  
  01/28/21 
0279 TROIQ <0.05 Lab Results Component Value Date/Time Culture result: No growth after 23 hours 01/28/2021 06:30 AM  
 Culture result: No growth 3 days 01/26/2021 11:55 AM  
 Culture result: (A) 01/22/2021 11:45 AM  
  Staphylococcus species, coagulase negative MULTIPLE COLONY TYPES/STRAINS growing in all 4 bottles drawn Culture result:  01/22/2021 11:45 AM  
  Gram pos cocci in clusters 
growing in 2 of 4 bottles drawn Dr. Chana Luo via Ms. Nilam Velazquez on 1.23.21 at 15:25 by tlw Imaging: CXR Results  (Last 48 hours)  01/28/21 0610  XR CHEST PORT Final result Narrative:  1 view comparison 22nd Moderate patchy interstitial infiltrates, midportion of right greater than left  
lung with hypoinflation overall. No effusion or pneumothorax. Normal heart and  
Mediastinum Agree with above diffuse patchy infiltrates marked worsening from previous exam  
  
  
 
Results from [facility] Encounter encounter on 01/28/21 XR CHEST PORT Narrative 1 view comparison yesterday Little change in patchy mixed infiltrates throughout each lung, lower volumes 
overall. No effusion. Normal heart and mediastinum XR CHEST PORT Narrative 1 view comparison 22nd Moderate patchy interstitial infiltrates, midportion of right greater than left 
lung with hypoinflation overall. No effusion or pneumothorax. Normal heart and 
mediastinum Results from [facility] Encounter encounter on 01/22/21 XR CHEST SNGL V  
 Narrative 1 new comparison March 10 Impression Impression: The cardiomediastinal silhouette is appropriate for age, technique, 
and lung expansion. Pulmonary vasculature is not congested. The lungs are 
essentially clear. No effusion or pneumothorax is seen. Results from [facility] Encounter encounter on 01/28/21 CT CHEST WO CONT Narrative Noncontrast study shows pronounced patchy variable density groundglass 
opacification and lesser consolidation throughout much of each lung, relatively 
sparing the right lower lobe. Central airways are open. No mediastinal or hilar 
adenopathy. Normal caliber aorta. No pleural pericardial effusion. No 
significant upper abdominal findings Impression Pronounced patchy diffuse bilateral pneumonitis · Discussion: Marked worsening hypoxia and pulmonary infiltrates. CRP is down which certainly goes against worsening Covid pneumonitis. Will give Lasix as mentioned above and follow oxygenation electrolytes and renal function.   Agree with empiric treatment for Covid to include Decadron and remdesivir have spoken with infectious disease and will add vancomycin until repeat blood cultures are finalized · 1/29 worsening hypoxia will place on noninvasive ventilator and continue treatment for COVID-19 as well as IV Deysi Cornejo MD

## 2021-01-29 NOTE — PROGRESS NOTES
Pt refusing to wear Bipap, states she'd rather die than wear it. Pt states she does not want a tube down her throat either. Pt placed back on 100% HFNC, Dr Jasiel Morgan notified.

## 2021-01-29 NOTE — PROGRESS NOTES
Morning vitals pts. SpO2 was between 75-82% on 15L NC.  Respiratory therapist notified and patient placed on hi-flow at 60L/min @ 100% FiO2.  SpO2 between 92/96% Will continue to monitor.

## 2021-01-30 NOTE — PROGRESS NOTES
Nutrition Note RD consulted for wounds, noted pt with stage 2 PI to sacrum. On PO Consistent Carbohydrate (Diabetic) diet, noted BG generally >250mg/dL so far inpatient, no intake information documented. RD to add Beneprotein BID (170kcals, 12g pro), will f/u for full assessment in 2-3 days. Electronically signed by Jyoti Irving RD on 1/30/2021 at 6:28 PM 
 
Contact: Ext [phone]

## 2021-01-30 NOTE — WOUND CARE
Called [facility] and requested delivery of a Total Care Bariatric bed with air. Confirmation #[id number].

## 2021-01-30 NOTE — PROGRESS NOTES
Hospitalist Progress Note Daily Progress Note: 1/30/2021 Subjective: The patient is seen for follow up. Patient is now on standard high flow oxygen with an FiO2 of 100%. Oxygen saturations are 92% on this Ferritin is 333, , CRP 1.35, procalcitonin negative patient is awake alert looks comfortable Problem List: 
Problem List as of 1/30/2021 Date Reviewed: 10/6/2020 Codes Class Noted - Resolved COVID-19 ICD-10-CM: U07.1 ICD-9-CM: 079.89  1/28/2021 - Present Acute respiratory failure due to COVID-19 [facility]) ICD-10-CM: U07.1, J96.00 
ICD-9-CM: 518.81, 079.89  1/22/2021 - Present Vitamin D deficiency ICD-10-CM: E55.9 ICD-9-CM: 268.9  10/6/2020 - Present History of CVA (cerebrovascular accident) ICD-10-CM: Z86.73 
ICD-9-CM: V12.54  10/5/2020 - Present Essential hypertension ICD-10-CM: I10 
ICD-9-CM: 401.9  10/5/2020 - Present Dependence on wheelchair ICD-10-CM: Z99.3 ICD-9-CM: V46.3  10/5/2020 - Present Morbid obesity ([facility] ICD-10-CM: E66.01 
ICD-9-CM: 278.01  10/5/2020 - Present Pure hypercholesterolemia ICD-10-CM: E78.00 ICD-9-CM: 272.0  10/5/2020 - Present Urinary incontinence ICD-10-CM: R32 
ICD-9-CM: 788.30  10/5/2020 - Present DM (diabetes mellitus), type 2 ([facility] ICD-10-CM: E11.9 ICD-9-CM: 250.00  10/5/2020 - Present Medications reviewed Current Facility-Administered Medications Medication Dose Route Frequency  furosemide (LASIX) injection 60 mg  60 mg IntraVENous DAILY  insulin lispro (HUMALOG) injection   SubCUTAneous AC&HS  
 glucose chewable tablet 16 g  4 Tab Oral PRN  
 glucagon (GLUCAGEN) injection 1 mg  1 mg IntraMUSCular PRN  
 dextrose (D50W) injection syrg 12.5-25 g  25-50 mL IntraVENous PRN  
 ketorolac (TORADOL) tablet 10 mg  10 mg Oral Q6H PRN  
 hydrOXYzine pamoate (VISTARIL) capsule 50 mg  50 mg Oral QID PRN  
 sodium chloride (NS) flush 5-40 mL  5-40 mL IntraVENous Q8H  
 sodium chloride (NS) flush 5-40 mL  5-40 mL IntraVENous PRN  
 enoxaparin (LOVENOX) injection 40 mg  40 mg SubCUTAneous Q12H  
 acetaminophen (TYLENOL) tablet 650 mg  650 mg Oral Q6H PRN Or  
 acetaminophen (TYLENOL) suppository 650 mg  650 mg Rectal Q6H PRN  cholecalciferol (VITAMIN D3) (1000 Units /25 mcg) tablet 2,000 Units  2,000 Units Oral DAILY  remdesivir 100 mg in 0.9% sodium chloride 250 mL IVPB  100 mg IntraVENous Q24H  
 albuterol (PROVENTIL HFA, VENTOLIN HFA, PROAIR HFA) inhaler 2 Puff  2 Puff Inhalation Q6H RT  
 budesonide-formoteroL (SYMBICORT) 160-4.5 mcg/actuation HFA inhaler 2 Puff  2 Puff Inhalation BID RT  
 VANCOMYCIN INFORMATION NOTE 1 Each  1 Each Other Rx Dosing/Monitoring  dexamethasone (DECADRON) 4 mg/mL injection 4 mg  4 mg IntraVENous Q6H Review of Systems: A comprehensive review of systems was negative except for that written in the HPI. Objective:  
Physical Exam:  
 
Visit Vitals /65 (BP 1 Location: Right lower arm, BP Patient Position: Supine) Pulse 64 Temp 99 °F (37.2 °C) Resp 22 Ht 5' 4\" (1.626 m) Wt 95.3 kg (210 lb) SpO2 95% BMI 36.05 kg/m² O2 Flow Rate (L/min): 60 l/min O2 Device: Hi flow nasal cannula Temp (24hrs), Av.2 °F (36.8 °C), Min:97.4 °F (36.3 °C), Max:99 °F (37.2 °C) No intake/output data recorded.  1901 -  0700 In: 200 [P.O.:200] Out:  [Urine:] General:   Awake and alert. High flow nasal cannula in place, obesity Lungs:    Crackles bilateral  
Chest wall:  No tenderness or deformity. Heart:  Regular rate and rhythm, S1, S2 normal, no murmur, click, rub or gallop. Abdomen:   Soft, non-tender. Bowel sounds normal. No masses,  No organomegaly. Extremities: Extremities normal, atraumatic, no cyanosis or edema. Pulses: 2+ and symmetric all extremities. Skin: Skin color, texture, turgor normal. No rashes or lesions Neurologic: CNII-XII intact.   No gross focal deficits Data Review:  
   
Recent Days: 
Recent Labs  
  01/30/21 
0553 01/28/21 
0615 WBC 7.1 11.7* HGB 11.5 12.7 HCT 34.3* 38.2  220 Recent Labs  
  01/30/21 
0553 01/28/21 
9314  142  
K 3.9 3.7  105 CO2 30 29 * 130* BUN 47* 46* CREA 1.33* 1.47* CA 9.2 9.5 PHOS 3.8  --   
ALB 3.1* 3.5 TBILI  --  1.3* ALT  --  22 No results for input(s): PH, PCO2, PO2, HCO3, FIO2 in the last 72 hours. 24 Hour Results: 
Recent Results (from the past 24 hour(s)) GLUCOSE, POC Collection Time: 01/29/21  3:27 PM  
Result Value Ref Range Glucose (POC) 279 (H) 65 - 100 mg/dL Performed by Darrell Winston GLUCOSE, POC Collection Time: 01/29/21  8:27 PM  
Result Value Ref Range Glucose (POC) 256 (H) 65 - 100 mg/dL Performed by Eusebia Woods   
CBC WITH AUTOMATED DIFF Collection Time: 01/30/21  5:53 AM  
Result Value Ref Range WBC 7.1 3.6 - 11.0 K/uL  
 RBC 4.35 3.80 - 5.20 M/uL  
 HGB 11.5 11.5 - 16.0 g/dL HCT 34.3 (L) 35.0 - 47.0 % MCV 78.9 (L) 80.0 - 99.0 FL  
 MCH 26.4 26.0 - 34.0 PG  
 MCHC 33.5 30.0 - 36.5 g/dL  
 RDW 14.9 (H) 11.5 - 14.5 % PLATELET 457 460 - 993 K/uL MPV 11.0 8.9 - 12.9 FL  
 NEUTROPHILS 80 (H) 32 - 75 % LYMPHOCYTES 12 12 - 49 % MONOCYTES 7 5 - 13 % EOSINOPHILS 0 0 - 7 % BASOPHILS 0 0 - 1 % IMMATURE GRANULOCYTES 1 (H) 0.0 - 0.5 % ABS. NEUTROPHILS 5.7 1.8 - 8.0 K/UL  
 ABS. LYMPHOCYTES 0.9 0.8 - 3.5 K/UL  
 ABS. MONOCYTES 0.5 0.0 - 1.0 K/UL  
 ABS. EOSINOPHILS 0.0 0.0 - 0.4 K/UL  
 ABS. BASOPHILS 0.0 0.0 - 0.1 K/UL  
 ABS. IMM. GRANS. 0.1 (H) 0.00 - 0.04 K/UL  
 DF AUTOMATED    
C REACTIVE PROTEIN, QT Collection Time: 01/30/21  5:53 AM  
Result Value Ref Range C-Reactive protein 1.85 (H) 0.00 - 0.60 mg/dL LD Collection Time: 01/30/21  5:53 AM  
Result Value Ref Range  (H) 81 - 246 U/L  
RENAL FUNCTION PANEL  Collection Time: 01/30/21  5:53 AM Result Value Ref Range Sodium 141 136 - 145 mmol/L Potassium 3.9 3.5 - 5.1 mmol/L Chloride 104 97 - 108 mmol/L  
 CO2 30 21 - 32 mmol/L Anion gap 7 5 - 15 mmol/L Glucose 242 (H) 65 - 100 mg/dL BUN 47 (H) 6 - 20 mg/dL Creatinine 1.33 (H) 0.55 - 1.02 mg/dL BUN/Creatinine ratio 35 (H) 12 - 20 GFR est AA 46 (L) >60 ml/min/1.73m2 GFR est non-AA 38 (L) >60 ml/min/1.73m2 Calcium 9.2 8.5 - 10.1 mg/dL Phosphorus 3.8 2.6 - 4.7 mg/dL Albumin 3.1 (L) 3.5 - 5.0 g/dL Sukumar [place] Collection Time: 01/30/21  5:53 AM  
Result Value Ref Range Vancomycin, random 9.4 ug/mL GLUCOSE, POC Collection Time: 01/30/21  7:51 AM  
Result Value Ref Range Glucose (POC) 260 (H) 65 - 100 mg/dL Performed by Winnie Christianson, POC Collection Time: 01/30/21  7:56 AM  
Result Value Ref Range Glucose (POC) 155 (H) 65 - 100 mg/dL Performed by Winnie Christianson, POC Collection Time: 01/30/21 12:09 PM  
Result Value Ref Range Glucose (POC) 314 (H) 65 - 100 mg/dL Performed by Mirlande Final Result CT CHEST WO CONT Final Result Pronounced patchy diffuse bilateral pneumonitis XR CHEST PORT Final Result Assessment: 
COVID-19 with pneumonitis, on remdesivir Decadron Acute respiratory failure with hypoxia, patient did decline BiPAP currently on high high flow oxygen followed by pulmonology 
  
Diabetes mellitus type 2: uncontrolled 
  
COPD without exacerbation 
  
Chronic kidney disease stage III good urine output 
  
Chronic heart failure, details unknown 
  
History of CVA, wheelchair-bound 
  
Hypertension Plan: 
Continue IV remdesivir, Decadron, IV antibiotics Added moderate sliding scale protocol check hemoglobin A1c Symptomatic management, avoid renal toxicity medicine CBC BMP in the morning CODE STATUS is DNR Total time spent with patient: 30 minutes.  
 
Everardo Kruger MD

## 2021-01-30 NOTE — WOUND CARE
IP WOUND CONSULT Elliot Easton MEDICAL RECORD NUMBER:  [id number] AGE: 78 y.o. GENDER: female  : 1940 TODAY'S DATE:  2021 GENERAL  
 
[] Follow-up [x] New Consult Elliot Easton is a 78 y.o. female referred by:  
[x] Physician 
[] Nursing 
[] Other: PAST MEDICAL HISTORY Past Medical History:  
Diagnosis Date  Arthritis  Congestive heart failure ([facility]  Dependence on wheelchair 10/5/2020  DM (diabetes mellitus), type 2 ([facility] 10/5/2020  Essential hypertension 10/5/2020  
 History of CVA (cerebrovascular accident) 10/5/2020  Morbid obesity ([facility] 10/5/2020  Pure hypercholesterolemia 10/5/2020  Urinary incontinence 10/5/2020 PAST SURGICAL HISTORY Past Surgical History:  
Procedure Laterality Date  HX HYSTERECTOMY FAMILY HISTORY Family History Family history unknown: Yes ALLERGIES Allergies Allergen Reactions  Sulfa (Sulfonamide Antibiotics) Angioedema  Contrast Dye [Iodine] Itching MEDICATIONS No current facility-administered medications on file prior to encounter. Current Outpatient Medications on File Prior to Encounter Medication Sig Dispense Refill  amLODIPine (NORVASC) 5 mg tablet Take 1 Tab by mouth daily. 30 Tab 0  
 atenoloL (TENORMIN) 50 mg tablet Take 1 Tab by mouth daily. 30 Tab 0  
 atorvastatin (LIPITOR) 40 mg tablet Take 1 Tab by mouth nightly. 30 Tab 0  clopidogreL (PLAVIX) 75 mg tab Take 1 Tab by mouth daily. 30 Tab 0  
 budesonide-formoteroL (SYMBICORT) 160-4.5 mcg/actuation HFAA Take 2 Puffs by inhalation every twelve (12) hours every twelve (12) hours. 3 Inhaler 0  
 dexAMETHasone (DECADRON) 2 mg tablet Take 2 tablets daily for 4 days, Take 1 tablet daily for 4 days 12 Tab 0  
 insulin lispro (HUMALOG) 100 unit/mL injection INITIATE CORRECTIVE INSULIN PROTOCOL (TRUE): 
RX TRUE Normal Sensitivity (Average weight) AC (before meals), Q6H, and Q4H CORRECTIONAL SCALE only For Blood Sugar (mg/dl) of :            
140-199=2 units 200-249=3 units 250-299=5 units 300-349=7 units 350-399 = 10 units 400 or greater = Call MD 
Give in addition to basal medications. Do Not Hold for NPO BEDTIME CORRECTIONAL sliding scale when scheduled: 
200-249=2 units 250-299=3 units 300-349=4 units 350- 399 = 8 units 400 or greater = Call MD 
Give in addition to basal medications. Do Not Hold for NPO Fast Acting - Administer Immediately - or within 15 minutes of start of meal, if mealtime coverage. 1 Vial 0  
 loratadine (CLARITIN) 10 mg tablet TAKE 1 TABLET BY MOUTH DAILY. 30 Tab 5  
 omeprazole (PRILOSEC) 40 mg capsule TAKE 1 CAPSULE BY MOUTH ONCE DAILY 30 MINUTES BEFORE EATING. 90 Cap 1  
 losartan (COZAAR) 50 mg tablet Take 1 Tab by mouth daily.  furosemide (LASIX) 40 mg tablet Take 1 Tab by mouth daily.  glipiZIDE (GLUCOTROL) 10 mg tablet Take 10 mg by mouth two (2) times a day.  hydrALAZINE (APRESOLINE) 50 mg tablet Take 1 Tab by mouth two (2) times a day. Dose increased from 25 to 50 mg twice a day  Indications: high blood pressure 180 Tab 1 [unfilled] Visit Vitals /70 (BP 1 Location: Right lower arm, BP Patient Position: Supine) Pulse 78 Temp 98.5 °F (36.9 °C) Resp 24 Ht 5' 4\" (1.626 m) Wt 95.3 kg (210 lb) SpO2 92% BMI 36.05 kg/m² ASSESSMENT Wound Identification & Type: 3 x Stage 2 PIs to sacrum with MASD Dressing change: zinc paste Verbal consent for picture: Yes Contributing Factors: anticoagulation therapy, diabetes, poor glucose control, chronic pressure, decreased mobility, shear force, incontinence of stool, incontinence of urine and obesity Wound Sacrum (Active) Wound Image   01/30/21 1751 Wound Etiology Pressure Stage 2 01/30/21 1751 Dressing Status New dressing applied 01/30/21 1751 Cleansed Other (Comment) 01/30/21 1751 Dressing/Treatment Zinc paste 01/30/21 1751 Wound Assessment Dry;Pink/red 01/30/21 1751 Drainage Amount None 01/30/21 1751 Wound Odor None 01/30/21 1751 Angela-Wound/Incision Assessment Dry/flaky;Fragile 01/30/21 1751 Edges Flat/open edges 01/30/21 1751 Wound Thickness Description Partial thickness 01/30/21 1751 Number of days: 8 PLAN Skin Care & Pressure Relief Recommendations Speciality bed Total Care Bariatric bed with air Minimize layers of linen Turn/reposition approximately every 2 hours Pillow wedges Manage incontinence Promote continence; Skin Protective lotion/cream to buttocks and sacrum daily and as needed with incontinence care Offload heels pillows Sundeep 14 Blood Glucose:  314 on 1/30/21 Albumin: 3.1 on 1/30/21 Physician/Provider notified:  
Recommendations: Total Care Bariatric bed with air for pressure reduction. Sacrum has deteriorated since previous admission assessment on 1/26/21. Float heels while in bed; continue with PureWick external catheter for management of  incontinence; turn and reposition q2h using foam wedge; and zinc paste to sacrum and buttocks TID and prn for soiling. Will continue to follow. Teaching completed with:  
[] Patient          
[] Family member      
[] Caregiver         
[] Nursing 
[] Other Patient/Caregiver Teaching: 
Level of patient/caregiver understanding able to:  
[] Indicates understanding       [] Needs reinforcement 
[] Unsuccessful      [] Verbal Understanding 
[] Demonstrated understanding       [] No evidence of learning 
[] Refused teaching         [] N/A Electronically signed by Vero Medina RN on 1/30/2021 at 5:58 PM

## 2021-01-30 NOTE — CONSULTS
Consult Pulmonary, Critical Care Name: Raymundo Gonzalez MRN: [id number] : 1940 Hospital: [facility] Emergency Room Date: 2021  Admission date: 2021 Hospital Day: 3 Subjective/Interval History:  
Seen in the emergency room. Patient was just in the hospital last week with COVID-19 positive hypoxia pneumonitis. She was treated with Decadron Actemra and 1 dose ivermectin. She was weaned to room air with decreasing CRP at the time of discharge. She did well for the first 2 or 3 days but about 2 days ago started noticing worsening shortness of breath again. She had low-grade fever last night 99 4 came to the emergency room was found to have worsening hypoxia and is now on nonrebreather. Chest x-ray shows marked worsening diffuse infiltrates she states she has had some cough mostly nonproductive had nausea vomiting diarrhea states her appetite is unchanged and still has a sense of taste of interest her CRP at the time of discharge 0.87 and today 1.35 her blood cultures from  showed 4 of 4 with coagulase-negative staph aureus. She was an extremely difficult stick and it was felt that this was probably 1 stick in for blood culture bottles and represented skin contaminant. Blood culture from  at the time of discharge was no growth  progressively worsening hypoxia had to place on 100% nasal high flow. She now is running oxygen saturation of 88%. High flow rate increased to 70 L and saturation only 90%  actually better today tolerating high flow nasal oxygen. She was desatting  and placed on BiPAP and refused to wear it. She has stated she does not want to be intubated or to use BiPAP Hospital Problems  Date Reviewed: 10/6/2020 Codes Class Noted POA COVID-19 ICD-10-CM: U07.1 ICD-9-CM: 079.89  2021 Unknown IMPRESSION:  
1. Hypoxic respiratory failure 2.  Diffuse pulmonary infiltrates pulmonary edema versus community-acquired pneumonia/COVID-19 pneumonitis 3. Recent COVID-19 infection 4. History CHF 5. History diabetes 6. Chronic kidney disease Body mass index is 36.05 kg/m². 7.   
  
RECOMMENDATIONS/PLAN:  
1. Continue Decadron and remdesivir 2. We will repeat Lasix 3. Will place back on inhaled albuterol and Symbicort 4. [x] High complexity decision making was performed [x] See my orders for details Subjective/Initial History:  
 
I was asked by Aicha Callahan MD to see Karmen Randolph  a 78 y.o.  female in consultation for a chief complaint of hypoxic respiratory failure Covid pneumonitis versus pulmonary edema Allergies Allergen Reactions  Sulfa (Sulfonamide Antibiotics) Angioedema  Contrast Dye [Iodine] Itching MAR reviewed and pertinent medications noted or modified as needed Current Facility-Administered Medications Medication  insulin lispro (HUMALOG) injection  glucose chewable tablet 16 g  
 glucagon (GLUCAGEN) injection 1 mg  dextrose (D50W) injection syrg 12.5-25 g  
 ketorolac (TORADOL) tablet 10 mg  
 hydrOXYzine pamoate (VISTARIL) capsule 50 mg  
 sodium chloride (NS) flush 5-40 mL  sodium chloride (NS) flush 5-40 mL  enoxaparin (LOVENOX) injection 40 mg  
 acetaminophen (TYLENOL) tablet 650 mg Or  acetaminophen (TYLENOL) suppository 650 mg  cholecalciferol (VITAMIN D3) (1000 Units /25 mcg) tablet 2,000 Units  remdesivir 100 mg in 0.9% sodium chloride 250 mL IVPB  albuterol (PROVENTIL HFA, VENTOLIN HFA, PROAIR HFA) inhaler 2 Puff  budesonide-formoteroL (SYMBICORT) 160-4.5 mcg/actuation HFA inhaler 2 Puff  VANCOMYCIN INFORMATION NOTE 1 Each  
 dexamethasone (DECADRON) 4 mg/mL injection 4 mg Patient PCP: Winter Light MD 
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 She also has no past medical history of History of abuse in adulthood or History of abuse in childhood. PSH:   has a past surgical history that includes hx hysterectomy. FHX: Family history is unknown by patient. SHX:  reports that she has quit smoking. She has never used smokeless tobacco. She reports that she does not drink alcohol or use drugs. Systemic review: 
General states her weight has been same for the last week just noticed fever yesterday with worsening shortness of breath Eyes no double vision or momentary blindness ENT no facial pain over the sinuses or drainage Endocrinologic she has diabetes denies polyuria polydipsia Musculoskeletal some muscle aches and pains unchanged from her baseline no swollen tender joints Neurologic no seizures or syncope Gastrointestinal no nausea or vomiting states her appetite has been unchanged and no change in her sense of taste Genitourinary no pain or discomfort on urination no bleeding Vascular denies ankle edema chest pain diaphoresis or nocturia Respiratory as mentioned worsening shortness of breath minimal cough no sputum production low-grade fever Objective:  
 
Vital Signs: Telemetry:    normal sinus rhythm Intake/Output:  
Visit Vitals /65 (BP 1 Location: Right lower arm, BP Patient Position: Supine) Pulse 64 Temp 99 °F (37.2 °C) Resp 22 Ht 5' 4\" (1.626 m) Wt 95.3 kg (210 lb) SpO2 94% BMI 36.05 kg/m² Temp (24hrs), Av.2 °F (36.8 °C), Min:97.4 °F (36.3 °C), Max:99 °F (37.2 °C) O2 Device: Hi flow nasal cannula O2 Flow Rate (L/min): 60 l/min Wt Readings from Last 4 Encounters:  
21 95.3 kg (210 lb)  
21 127 kg (280 lb) Intake/Output Summary (Last 24 hours) at 2021 1315 Last data filed at 1/30/2021 3353 Gross per 24 hour Intake 200 ml Output 875 ml Net -675 ml Last shift:      No intake/output data recorded. Last 3 shifts: 01/28 1901 - 01/30 0700 In: 200 [P.O.:200] Out: 2025 [Urine:2025] Physical Exam:  
General:  female; moderate distress with tachypnea no accessory muscle use HEENT: NCAT, oral mucosa clear Eyes: anicteric; conjunctiva clear extraocular movements intact Neck: no nodes, obesity obscures determination of JVD Chest: no deformity,  
Cardiac: Regular rate and rhythm no definite murmur Lungs: Rapid shallow respirations clear anteriorly and laterally posterior rales are present Abd: Obese soft positive bowel sounds no tenderness Ext: no edema; no joint swelling; No clubbing : clear urine Neuro: Awake alert seems anxious speech is relatively clear is hemiplegic on the left side moves the right side normally Psych- no agitation, oriented to person;  
Skin: warm, dry, no cyanosis; 
Pulses: Brachial and radial pulses are intact hard to find her femoral due to her obesity Capillary: Capillary refill Labs: 
 
Recent Labs  
  01/30/21 
0553 01/28/21 
5326 WBC 7.1 11.7* HGB 11.5 12.7  220 Recent Labs  
  01/30/21 
0553 01/28/21 
7838  142  
K 3.9 3.7  105 CO2 30 29 * 130* BUN 47* 46* CREA 1.33* 1.47* CA 9.2 9.5 PHOS 3.8  --   
LAC  --  1.1 ALB 3.1* 3.5 ALT  --  22 Nasal high flow 100% FiO2 with flow rate 70 L/min with oxygen saturation 98%  CRP 1.35, 2.87, 4.87 
  280 
 ferritin 333, 235 
1/20 4of4 blood cultures with coagulase-negative staph aureus 1/26 blood culture no growth Recent Labs  
  01/28/21 
6521 TROIQ <0.05 Lab Results Component Value Date/Time  Culture result: No growth 2 days 01/28/2021 06:30 AM  
 Culture result: No growth 4 days 01/26/2021 11:55 AM  
 Culture result: (A) 01/22/2021 11:45 AM  
  Staphylococcus species, coagulase negative MULTIPLE COLONY TYPES/STRAINS growing in all 4 bottles drawn Culture result:  01/22/2021 11:45 AM  
  Gram pos cocci in clusters 
growing in 2 of 4 bottles drawn Dr. Dalia Estrada via Ms. Ifrah Mariee on 1.23.21 at 15:25 by tlw Imaging: CXR Results  (Last 48 hours) 01/28/21 0610  XR CHEST PORT Final result Narrative:  1 view comparison 22nd Moderate patchy interstitial infiltrates, midportion of right greater than left  
lung with hypoinflation overall. No effusion or pneumothorax. Normal heart and  
Mediastinum Agree with above diffuse patchy infiltrates marked worsening from previous exam  
  
  
 
[place] [facility] [place] XR CHEST PORT Narrative 1 view comparison yesterday Little change in patchy mixed infiltrates throughout each lung, lower volumes 
overall. No effusion. Normal heart and mediastinum XR CHEST PORT Narrative 1 view comparison 22nd Moderate patchy interstitial infiltrates, midportion of right greater than left 
lung with hypoinflation overall. No effusion or pneumothorax. Normal heart and 
mediastinum [place] [facility] [place] XR CHEST SNGL V  
 Narrative 1 new comparison March 10 Impression Impression: The cardiomediastinal silhouette is appropriate for age, technique, 
and lung expansion. Pulmonary vasculature is not congested. The lungs are 
essentially clear. No effusion or pneumothorax is seen. [place] [facility] [place]21 CT CHEST WO CONT Narrative Noncontrast study shows pronounced patchy variable density groundglass 
opacification and lesser consolidation throughout much of each lung, relatively 
sparing the right lower lobe. Central airways are open. No mediastinal or hilar 
adenopathy. Normal caliber aorta. No pleural pericardial effusion. No 
significant upper abdominal findings Impression Pronounced patchy diffuse bilateral pneumonitis · Discussion: Marked worsening hypoxia and pulmonary infiltrates. CRP is down which certainly goes against worsening Covid pneumonitis. Will give Lasix as mentioned above and follow oxygenation electrolytes and renal function. Agree with empiric treatment for Covid to include Decadron and remdesivir have spoken with infectious disease and will add vancomycin until repeat blood cultures are finalized · 1/29 worsening hypoxia will place on noninvasive ventilator and continue treatment for COVID-19 as well as IV Lasix 1/30 received Lasix yesterday oxygenation is improved. She has stated again that she does not want to be on life support machines or to be intubated. We will continue Lasix for diuresis. So far she is doing well today Nikki Duckworth MD

## 2021-01-30 NOTE — PROGRESS NOTES
Consult for Vancomycin Dosing by Pharmacy by Dr. Archana Delatorre Consult provided for this 78y.o. year old female , for indication of Bloodstream infection. Day of Therapy: 3 Goal of Level(s): 15-20mcg/dL Other Current Antibiotics: Remdesivir Significant Cultures:  
Results Procedure Component Value Units Date/Time CULTURE, BLOOD [[id number]] Collected: 01/28/21 0630 Order Status: Completed Specimen: Blood Updated: 01/30/21 7115 Special Requests: No Special Requests Culture result: No growth 2 days CULTURE, BLOOD, PAIRED [[id number]]  (Abnormal) Collected: 01/22/21 1145 Order Status: Completed Specimen: Blood Updated: 01/25/21 1615 Special Requests: No Special Requests Culture result:    
  Staphylococcus species, coagulase negative MULTIPLE COLONY TYPES/STRAINS growing in all 4 bottles drawn Gram pos cocci in clusters 
growing in 2 of 4 bottles drawn Dr. Ciro Leonardo via Ms. Javon Cook on 1.23.21 at 15:25 by tlw INFLUENZA A & B AG (RAPID TEST) [[id number]] Collected: 01/22/21 1123 Order Status: Completed Specimen: Nasopharyngeal from Nasal washing Updated: 01/22/21 1201 Influenza A Antigen Negative Influenza B Antigen Negative COVID-19 RAPID TEST [[id number]]  (Abnormal) Collected: 01/22/21 1115 Order Status: Completed Specimen: Nasopharyngeal Updated: 01/22/21 1205 Specimen source Nasopharyngeal     
  COVID-19 rapid test DETECTED New Regimen:  
Level this AM was 9.4 (~ 9 hours after 500 mg x 1 was given). Will give 750 mg x 1 and order a random level for tomorrow in the AM. Pharmacy to follow daily and will make changes to dose and/or frequency based on clinical status. _________________________________ Pharmacist Nicole Gomez PHARMD

## 2021-01-30 NOTE — PROGRESS NOTES
Visit attempted for patient in [address] [place] [address] med/surg unit for admission patient request.  Per current COVID-19 isolation protocol in effect, I provided silent support and prayer outside patient room. There were no family members present. Chaplains will follow up if further referrals are requested. Chaplain David Pablo M.Div.  can be reached by calling the  at [facility] 
[phone]

## 2021-01-31 NOTE — PROGRESS NOTES
Consult for Vancomycin Dosing by Pharmacy by Dr. Darlene Coburn Consult provided for this 78y.o. year old female , for indication of Bloodstream infection. Day of Therapy: 4 Goal of Level(s): 15 - 20 mcg/dL Other Current Antibiotics: N/A Serum Creatinine Creatinine Date Value Ref Range Status 01/30/2021 1.33 (H) 0.55 - 1.02 mg/dL Final  
01/28/2021 1.47 (H) 0.55 - 1.02 mg/dL Final  
01/26/2021 1.44 (H) 0.55 - 1.02 mg/dL Final  
  
Creatinine Clearance Estimated Creatinine Clearance: 36.5 mL/min (A) (based on SCr of 1.33 mg/dL (H)). BUN Lab Results Component Value Date/Time BUN 47 (H) 01/30/2021 05:53 AM  
  
WBC Lab Results Component Value Date/Time WBC 7.1 01/30/2021 05:53 AM  
  
Temp 98.6 °F (37 °C) Last Level: No results found for: [facility] Vancomycin, random Date Value Ref Range Status 01/31/2021 13.5 ug/mL Final  
  Comment: No reference range has been established. Ht Readings from Last 1 Encounters:  
01/28/21 162.6 cm (64\") Wt Readings from Last 1 Encounters:  
01/28/21 95.3 kg (210 lb) Ideal body weight: 54.7 kg (120 lb 9.5 oz) Adjusted ideal body weight: 70.9 kg (156 lb 5.7 oz) Previous Regimen: Vancomycin 750 mg IV  x 1 dose on 11/30/21 @  New Regimen: Will initiate patient on a scheduled dose of vancomycin 750 mg iv q 24 hrs. A trough will be ordered for Tuesday @ 1000 Pharmacy to follow daily and will make changes to dose and/or frequency based on clinical status. _________________________________ Pharmacist DARRIAN Rey

## 2021-01-31 NOTE — PROGRESS NOTES
Problem: Diabetes Self-Management Goal: *Disease process and treatment process Description: Define diabetes and identify own type of diabetes; list 3 options for treating diabetes. Outcome: Progressing Towards Goal 
Goal: *Incorporating nutritional management into lifestyle Description: Describe effect of type, amount and timing of food on blood glucose; list 3 methods for planning meals.  
Outcome: Progressing Towards Goal

## 2021-01-31 NOTE — PROGRESS NOTES
Patient oxygen saturation, on 70L high flow, 100%FiO2, ranging 87-91. Patient continues to refuse BiPap.

## 2021-01-31 NOTE — PROGRESS NOTES
Pulmonary, Critical Care Note 
 
Name: Ninoska Pratt MRN: [id number]  
: 1940 Hospital: [facility]  
Date: 2021  Admission date: 2021 Hospital Day: 4  
 
 
Subjective/Interval History:  
Seen in the emergency room.  Patient was just in the hospital last week with COVID-19 positive hypoxia pneumonitis.  She was treated with Decadron Actemra and 1 dose ivermectin.  She was weaned to room air with decreasing CRP at the time of discharge.  She did well for the first 2 or 3 days but about 2 days ago started noticing worsening shortness of breath again.  She had low-grade fever last night 99 4 came to the emergency room was found to have worsening hypoxia and is now on nonrebreather.  Chest x-ray shows marked worsening diffuse infiltrates she states she has had some cough mostly nonproductive had nausea vomiting diarrhea states her appetite is unchanged and still has a sense of taste of interest her CRP at the time of discharge 0.87 and today 1.35 her blood cultures from  showed 4 of 4 with coagulase-negative staph aureus.  She was an extremely difficult stick and it was felt that this was probably 1 stick in for blood culture bottles and represented skin contaminant.  Blood culture from  at the time of discharge was no growth 
 progressively worsening hypoxia had to place on 100% nasal high flow.  She now is running oxygen saturation of 88%.  High flow rate increased to 70 L and saturation only 90% 
 actually better today tolerating high flow nasal oxygen.  She was desatting  and placed on BiPAP and refused to wear it.  She has stated she does not want to be intubated or to use BiPAP 
Hospital Problems  Date Reviewed: 10/6/2020  
       Codes Class Noted POA  
 COVID-19 ICD-10-CM: U07.1 
ICD-9-CM: 079.89  2021 Unknown  
   
  
 
 
IMPRESSION:  
1. Acute Hypoxic respiratory failure 
2. Diffuse pulmonary infiltrates pulmonary edema versus  community-acquired pneumonia/COVID-19 pneumonitis 3. Recent COVID-19 infection 4. History CHF 5. History diabetes 6. Chronic kidney disease Body mass index is 36.05 kg/m². RECOMMENDATIONS/PLAN:  
1. Continue Decadron and remdesivir 2. Recieved Lasix 3. Continue on inhaled albuterol and Symbicort [x] High complexity decision making was performed [x] See my orders for details Subjective/Initial History:  
 
I was asked by Aicha Callahan MD to see Karmen Randolph  a 78 y.o.  female in consultation for a chief complaint of hypoxic respiratory failure Covid pneumonitis versus pulmonary edema Allergies Allergen Reactions  Sulfa (Sulfonamide Antibiotics) Angioedema  Contrast Dye [Iodine] Itching MAR reviewed and pertinent medications noted or modified as needed Current Facility-Administered Medications Medication  vancomycin (VANCOCIN) 750 mg in 0.9% sodium chloride 250 mL (VIAL-MATE)  [START ON 2/2/2021] VANCOMYCIN INFORMATION NOTE  lidocaine (XYLOCAINE) 4 % cream  
 furosemide (LASIX) injection 60 mg  
 heparin (porcine) injection 5,000 Units  glucose chewable tablet 16 g  
 glucagon (GLUCAGEN) injection 1 mg  dextrose (D50W) injection syrg 12.5-25 g  
 insulin lispro (HUMALOG) injection  hydrOXYzine pamoate (VISTARIL) capsule 50 mg  
 sodium chloride (NS) flush 5-40 mL  sodium chloride (NS) flush 5-40 mL  acetaminophen (TYLENOL) tablet 650 mg Or  acetaminophen (TYLENOL) suppository 650 mg  cholecalciferol (VITAMIN D3) (1000 Units /25 mcg) tablet 2,000 Units  remdesivir 100 mg in 0.9% sodium chloride 250 mL IVPB  albuterol (PROVENTIL HFA, VENTOLIN HFA, PROAIR HFA) inhaler 2 Puff  budesonide-formoteroL (SYMBICORT) 160-4.5 mcg/actuation HFA inhaler 2 Puff  VANCOMYCIN INFORMATION NOTE 1 Each  
 dexamethasone (DECADRON) 4 mg/mL injection 4 mg Patient PCP: Winter Light MD 
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 She also has no past medical history of History of abuse in adulthood or History of abuse in childhood. PSH:   has a past surgical history that includes hx hysterectomy. FHX: Family history is unknown by patient. SHX:  reports that she has quit smoking. She has never used smokeless tobacco. She reports that she does not drink alcohol or use drugs. Systemic review: 
General states her weight has been same for the last week just noticed fever yesterday with worsening shortness of breath Eyes no double vision or momentary blindness ENT no facial pain over the sinuses or drainage Endocrinologic she has diabetes denies polyuria polydipsia Musculoskeletal some muscle aches and pains unchanged from her baseline no swollen tender joints Neurologic no seizures or syncope Gastrointestinal no nausea or vomiting states her appetite has been unchanged and no change in her sense of taste Genitourinary no pain or discomfort on urination no bleeding Vascular denies ankle edema chest pain diaphoresis or nocturia Respiratory as mentioned worsening shortness of breath minimal cough no sputum production low-grade fever Objective:  
 
Vital Signs: Telemetry:    normal sinus rhythm Intake/Output:  
Visit Vitals BP (!) 143/70 Pulse 73 Temp 98.3 °F (36.8 °C) Resp 20 Ht 5' 4\" (1.626 m) Wt 95.3 kg (210 lb) SpO2 93% BMI 36.05 kg/m² Temp (24hrs), Av.4 °F (36.9 °C), Min:98.3 °F (36.8 °C), Max:98.6 °F (37 °C) O2 Device: Hi flow nasal cannula, Heated O2 Flow Rate (L/min): 70 l/min Wt Readings from Last 4 Encounters:  
21 95.3 kg (210 lb) 01/22/21 127 kg (280 lb) Intake/Output Summary (Last 24 hours) at 1/31/2021 1054 Last data filed at 1/31/2021 0602 Gross per 24 hour Intake 100 ml Output  Net 100 ml Last shift:      No intake/output data recorded. Last 3 shifts: 01/29 1901 - 01/31 0700 In: 300 [P.O.:300] Out: 875 Lisbeth Bone Physical Exam:  
General:  female; moderate distress with tachypnea no accessory muscle use HEENT: NCAT, oral mucosa clear Eyes: anicteric; conjunctiva clear extraocular movements intact Neck: no nodes, obesity obscures determination of JVD Chest: no deformity,  
Cardiac: Regular rate and rhythm no definite murmur Lungs: Rapid shallow respirations clear anteriorly and laterally posterior rales are present Abd: Obese soft positive bowel sounds no tenderness Ext: no edema; no joint swelling; No clubbing : clear urine Neuro: Awake alert seems anxious speech is relatively clear is hemiplegic on the left side moves the right side normally Psych- no agitation, oriented to person;  
Skin: warm, dry, no cyanosis; 
Pulses: Brachial and radial pulses are intact hard to find her femoral due to her obesity Capillary: Capillary refill Labs: 
 
Recent Labs  
  01/30/21 
5480 WBC 7.1 HGB 11.5  Recent Labs  
  01/30/21 
3557   
K 3.9  CO2 30  
* BUN 47* CREA 1.33* CA 9.2 PHOS 3.8 ALB 3.1* Nasal high flow 100% FiO2 with flow rate 70 L/min with oxygen saturation 98%  CRP 1.35, 2.87, 4.87 
  280 
 ferritin 333, 235 
1/20 4of4 blood cultures with coagulase-negative staph aureus 1/26 blood culture no growth No results for input(s): CPK, CKNDX, TROIQ in the last 72 hours. No lab exists for component: CPKMB Lab Results Component Value Date/Time  Culture result: No growth 3 days 01/28/2021 06:30 AM  
 Culture result: No growth 5 days 01/26/2021 11:55 AM  
 Culture result: (A) 01/22/2021 11:45 AM  
 Staphylococcus species, coagulase negative MULTIPLE COLONY TYPES/STRAINS growing in all 4 bottles drawn Culture result:  01/22/2021 11:45 AM  
  Gram pos cocci in clusters 
growing in 2 of 4 bottles drawn Dr. Jadyn Alvarez via Ms. Marcelo Aggarwal on 1.23.21 at 15:25 by [email or web address] Imaging: CXR Results  (Last 48 hours) 01/28/21 0610  XR CHEST PORT Final result Narrative:  1 view comparison 22nd Moderate patchy interstitial infiltrates, midportion of right greater than left  
lung with hypoinflation overall. No effusion or pneumothorax. Normal heart and  
Mediastinum Agree with above diffuse patchy infiltrates marked worsening from previous exam  
  
  
 
[facility] [place] on 01/28/21 XR CHEST PORT Narrative 1 view comparison yesterday Little change in patchy mixed infiltrates throughout each lung, lower volumes 
overall. No effusion. Normal heart and mediastinum XR CHEST PORT Narrative 1 view comparison 22nd Moderate patchy interstitial infiltrates, midportion of right greater than left 
lung with hypoinflation overall. No effusion or pneumothorax. Normal heart and 
mediastinum Results from [facility] [place] on 01/22/21 XR CHEST SNGL V  
 Narrative 1 new comparison March 10 Impression Impression: The cardiomediastinal silhouette is appropriate for age, technique, 
and lung expansion. Pulmonary vasculature is not congested. The lungs are 
essentially clear. No effusion or pneumothorax is seen. Results from [facility] [place] on 01/28/21 CT CHEST WO CONT Narrative Noncontrast study shows pronounced patchy variable density groundglass 
opacification and lesser consolidation throughout much of each lung, relatively 
sparing the right lower lobe. Central airways are open. No mediastinal or hilar 
adenopathy. Normal caliber aorta. No pleural pericardial effusion. No 
significant upper abdominal findings  Impression Pronounced patchy diffuse bilateral pneumonitis · Discussion: Marked worsening hypoxia and pulmonary infiltrates. CRP is down which certainly goes against worsening Covid pneumonitis. Will give Lasix as mentioned above and follow oxygenation electrolytes and renal function. Agree with empiric treatment for Covid to include Decadron and remdesivir have spoken with infectious disease and will add vancomycin until repeat blood cultures are finalized · 1/29 worsening hypoxia will place on noninvasive ventilator and continue treatment for COVID-19 as well as IV Lasix 1/30 received Lasix yesterday oxygenation is improved. She has stated again that she does not want to be on life support machines or to be intubated. We will continue Lasix for diuresis. So far she is doing well today Blanco Schaeffer MD

## 2021-01-31 NOTE — PROGRESS NOTES
Hospitalist Progress Note Daily Progress Note: 1/31/2021 Subjective: The patient is seen for follow up. Patient is now on standard high flow oxygen with an FiO2 of 70%. Oxygen saturations are 92% on this 
 patient is awake alert looks comfortable,+ covid pneumonia, patient complains of back pain 
 
Problem List: 
Problem List as of 1/31/2021 Date Reviewed: 10/6/2020 Codes Class Noted - Resolved COVID-19 ICD-10-CM: U07.1 ICD-9-CM: 079.89  1/28/2021 - Present Acute respiratory failure due to COVID-19 [facility]) ICD-10-CM: U07.1, J96.00 
ICD-9-CM: 518.81, 079.89  1/22/2021 - Present Vitamin D deficiency ICD-10-CM: E55.9 ICD-9-CM: 268.9  10/6/2020 - Present History of CVA (cerebrovascular accident) ICD-10-CM: Z86.73 
ICD-9-CM: V12.54  10/5/2020 - Present Essential hypertension ICD-10-CM: I10 
ICD-9-CM: 401.9  10/5/2020 - Present Dependence on wheelchair ICD-10-CM: Z99.3 ICD-9-CM: V46.3  10/5/2020 - Present Morbid obesity ([facility] ICD-10-CM: E66.01 
ICD-9-CM: 278.01  10/5/2020 - Present Pure hypercholesterolemia ICD-10-CM: E78.00 ICD-9-CM: 272.0  10/5/2020 - Present Urinary incontinence ICD-10-CM: R32 
ICD-9-CM: 788.30  10/5/2020 - Present DM (diabetes mellitus), type 2 ([email or web address] ICD-10-CM: E11.9 ICD-9-CM: 250.00  10/5/2020 - Present Medications reviewed Current Facility-Administered Medications Medication Dose Route Frequency  vancomycin (VANCOCIN) 750 mg in 0.9% sodium chloride 250 mL (VIAL-MATE)  750 mg IntraVENous Q24H  
 [START ON 2/2/2021] VANCOMYCIN INFORMATION NOTE   Other ONCE  
 lidocaine (XYLOCAINE) 4 % cream   Topical BID  furosemide (LASIX) injection 60 mg  60 mg IntraVENous DAILY  heparin (porcine) injection 5,000 Units  5,000 Units SubCUTAneous Q12H  
 glucose chewable tablet 16 g  4 Tab Oral PRN  
 glucagon (GLUCAGEN) injection 1 mg  1 mg IntraMUSCular PRN  
 dextrose (D50W) injection syrg 12.5-25 g  25-50 mL IntraVENous PRN  
 insulin lispro (HUMALOG) injection   SubCUTAneous AC&HS  hydrOXYzine pamoate (VISTARIL) capsule 50 mg  50 mg Oral QID PRN  
 sodium chloride (NS) flush 5-40 mL  5-40 mL IntraVENous Q8H  
 sodium chloride (NS) flush 5-40 mL  5-40 mL IntraVENous PRN  
 acetaminophen (TYLENOL) tablet 650 mg  650 mg Oral Q6H PRN Or  
 acetaminophen (TYLENOL) suppository 650 mg  650 mg Rectal Q6H PRN  cholecalciferol (VITAMIN D3) (1000 Units /25 mcg) tablet 2,000 Units  2,000 Units Oral DAILY  remdesivir 100 mg in 0.9% sodium chloride 250 mL IVPB  100 mg IntraVENous Q24H  
 albuterol (PROVENTIL HFA, VENTOLIN HFA, PROAIR HFA) inhaler 2 Puff  2 Puff Inhalation Q6H RT  
 budesonide-formoteroL (SYMBICORT) 160-4.5 mcg/actuation HFA inhaler 2 Puff  2 Puff Inhalation BID RT  
 VANCOMYCIN INFORMATION NOTE 1 Each  1 Each Other Rx Dosing/Monitoring  dexamethasone (DECADRON) 4 mg/mL injection 4 mg  4 mg IntraVENous Q6H Review of Systems: A comprehensive review of systems was negative except for that written in the HPI. Objective:  
Physical Exam:  
 
Visit Vitals BP (!) 143/70 Pulse 73 Temp 98.3 °F (36.8 °C) Resp 20 Ht 5' 4\" (1.626 m) Wt 95.3 kg (210 lb) SpO2 94% BMI 36.05 kg/m² O2 Flow Rate (L/min): 70 l/min O2 Device: Hi flow nasal cannula, Heated Temp (24hrs), Av.4 °F (36.9 °C), Min:98.3 °F (36.8 °C), Max:98.6 °F (37 °C) No intake/output data recorded.  1901 -  0700 In: 300 [P.O.:300] Out: 875 Elpidio Svetlana General:   Awake and alert. High flow nasal cannula in place, obesity Lungs:    Crackles bilateral  
Chest wall:  No tenderness or deformity. Heart:  Regular rate and rhythm, S1, S2 normal, no murmur, click, rub or gallop. Abdomen:   Soft, non-tender. Bowel sounds normal. No masses,  No organomegaly. Extremities: Extremities normal, atraumatic, no cyanosis or edema.   
Pulses: 2+ and symmetric all extremities. Skin: Skin color, texture, turgor normal. No rashes or lesions Neurologic: CNII-XII intact. No gross focal deficits Data Review:  
   
Recent Days: 
Recent Labs  
  01/30/21 
0553 WBC 7.1 HGB 11.5 HCT 34.3*  
 Recent Labs  
  01/30/21 
4780   
K 3.9  CO2 30  
* BUN 47* CREA 1.33* CA 9.2 PHOS 3.8 ALB 3.1* No results for input(s): PH, PCO2, PO2, HCO3, FIO2 in the last 72 hours. 24 Hour Results: 
Recent Results (from the past 24 hour(s)) GLUCOSE, POC Collection Time: 01/30/21  7:29 PM  
Result Value Ref Range Glucose (POC) 271 (H) 65 - 100 mg/dL Performed by Enmanuel Aponte Collection Time: 01/31/21  3:39 AM  
Result Value Ref Range Vancomycin, random 13.5 ug/mL GLUCOSE, POC Collection Time: 01/31/21  7:40 AM  
Result Value Ref Range Glucose (POC) 267 (H) 65 - 100 mg/dL Performed by Jolly Longo, POC Collection Time: 01/31/21 10:52 AM  
Result Value Ref Range Glucose (POC) 228 (H) 65 - 100 mg/dL Performed by Ben Boyce   
 
 
XR CHEST PORT Final Result CT CHEST WO CONT Final Result Pronounced patchy diffuse bilateral pneumonitis XR CHEST PORT Final Result Assessment: 
COVID-19 with pneumonitis, on remdesivir Decadron Acute respiratory failure with hypoxia, patient did decline BiPAP currently on high high flow oxygen followed by pulmonology 
  
Diabetes mellitus type 2: uncontrolled 
  
COPD without exacerbation 
  
Chronic kidney disease stage III good urine output 
  
Chronic heart failure, details unknown 
  
History of CVA, wheelchair-bound 
  
Hypertension fair controlled Plan: 
Continue IV remdesivir, Decadron, IV antibiotics Added moderate sliding scale protocol Symptomatic management, avoid renal toxicity medicine PTOT as ordered, frequent position change to avoid  bedsores DVT stress ulcer prophylaxis CODE STATUS is DNR 
 
Total time spent with patient: 30 minutes.  
 
Maite Gale MD

## 2021-02-01 NOTE — PROGRESS NOTES
Hospitalist Progress Note Daily Progress Note: 2/1/2021 Subjective: The patient is seen for follow up. Patient is now on standard high flow oxygen with an FiO2 of 70%. Oxygen saturations are 92% on this 
 patient is awake alert looks comfortable,+ covid pneumonia, patient complains of back pain, noticed hyperglycemia Problem List: 
Problem List as of 2/1/2021 Date Reviewed: 10/6/2020 Codes Class Noted - Resolved COVID-19 ICD-10-CM: U07.1 ICD-9-CM: 079.89  1/28/2021 - Present Acute respiratory failure due to COVID-19 [facility]) ICD-10-CM: U07.1, J96.00 
ICD-9-CM: 518.81, 079.89  1/22/2021 - Present Vitamin D deficiency ICD-10-CM: E55.9 ICD-9-CM: 268.9  10/6/2020 - Present History of CVA (cerebrovascular accident) ICD-10-CM: Z86.73 
ICD-9-CM: V12.54  10/5/2020 - Present Essential hypertension ICD-10-CM: I10 
ICD-9-CM: 401.9  10/5/2020 - Present Dependence on wheelchair ICD-10-CM: Z99.3 ICD-9-CM: V46.3  10/5/2020 - Present [facility]bid [facility] ([facility] ICD-10-CM: E66.01 
ICD-9-CM: 278.01  10/5/2020 - Present Pure hypercholesterolemia ICD-10-CM: E78.00 ICD-9-CM: 272.0  10/5/2020 - Present Urinary incontinence ICD-10-CM: R32 
ICD-9-CM: 788.30  10/5/2020 - Present DM (diabetes mellitus), type 2 ([facility] ICD-10-CM: E11.9 ICD-9-CM: 250.00  10/5/2020 - Present Medications reviewed Current Facility-Administered Medications Medication Dose Route Frequency  insulin glargine (LANTUS) injection 13 Units  13 Units SubCUTAneous QHS  insulin lispro (HUMALOG) injection   SubCUTAneous AC&HS  
 dextrose (D50W) injection syrg 12.5-25 g  25-50 mL IntraVENous PRN  
 [START ON 2/2/2021] glimepiride (AMARYL) tablet 2 mg  2 mg Oral ACB  vancomycin (VANCOCIN) 750 mg in 0.9% sodium chloride 250 mL (VIAL-MATE)  750 mg IntraVENous Q24H  
 [START ON 2/2/2021] VANCOMYCIN INFORMATION NOTE   Other ONCE  
 lidocaine (XYLOCAINE) 4 % cream   Topical BID  furosemide (LASIX) injection 60 mg  60 mg IntraVENous DAILY  heparin (porcine) injection 5,000 Units  5,000 Units SubCUTAneous Q12H  
 glucose chewable tablet 16 g  4 Tab Oral PRN  
 glucagon (GLUCAGEN) injection 1 mg  1 mg IntraMUSCular PRN  
 dextrose (D50W) injection syrg 12.5-25 g  25-50 mL IntraVENous PRN  
 hydrOXYzine pamoate (VISTARIL) capsule 50 mg  50 mg Oral QID PRN  
 sodium chloride (NS) flush 5-40 mL  5-40 mL IntraVENous Q8H  
 sodium chloride (NS) flush 5-40 mL  5-40 mL IntraVENous PRN  
 acetaminophen (TYLENOL) tablet 650 mg  650 mg Oral Q6H PRN Or  
 acetaminophen (TYLENOL) suppository 650 mg  650 mg Rectal Q6H PRN  cholecalciferol (VITAMIN D3) (1000 Units /25 mcg) tablet 2,000 Units  2,000 Units Oral DAILY  albuterol (PROVENTIL HFA, VENTOLIN HFA, PROAIR HFA) inhaler 2 Puff  2 Puff Inhalation Q6H RT  
 budesonide-formoteroL (SYMBICORT) 160-4.5 mcg/actuation HFA inhaler 2 Puff  2 Puff Inhalation BID RT  
 VANCOMYCIN INFORMATION NOTE 1 Each  1 Each Other Rx Dosing/Monitoring  dexamethasone (DECADRON) 4 mg/mL injection 4 mg  4 mg IntraVENous Q6H Review of Systems: A comprehensive review of systems was negative except for that written in the HPI. Objective:  
Physical Exam:  
 
Visit Vitals /75 (BP 1 Location: Right upper arm, BP Patient Position: Supine) Pulse 90 Temp 98 °F (36.7 °C) Resp 22 Ht 5' 4\" (1.626 m) Wt 95.3 kg (210 lb) SpO2 93% BMI 36.05 kg/m² O2 Flow Rate (L/min): 70 l/min O2 Device: Heated, Hi flow nasal cannula Temp (24hrs), Av.2 °F (36.8 °C), Min:97.7 °F (36.5 °C), Max:98.9 °F (37.2 °C) No intake/output data recorded.  1901 -  0700 In: 100 [P.O.:100] Out: - General:   Awake and alert. High flow nasal cannula in place, obesity Lungs:    Crackles bilateral  
Chest wall:  No tenderness or deformity.   
Heart:  Regular rate and rhythm, S1, S2 normal, no murmur, click, rub or gallop. Abdomen:   Soft, non-tender. Bowel sounds normal. No masses,  No organomegaly. Extremities: Extremities normal, atraumatic, no cyanosis or edema. Pulses: 2+ and symmetric all extremities. Skin: Skin color, texture, turgor normal. No rashes or lesions Neurologic: CNII-XII intact. No gross focal deficits Data Review:  
   
Recent Days: 
Recent Labs 02/01/21 
1314 01/30/21 
0553 WBC 9.3 7.1 HGB 11.9 11.5 HCT 35.8 34.3*  
 214 Recent Labs 02/01/21 
1314 01/30/21 
0553  141  
K 3.9 3.9  104 CO2 25 30 * 242* BUN 55* 47* CREA 1.56* 1.33* CA 9.1 9.2 PHOS 3.1 3.8 ALB 3.2* 3.1* No results for input(s): PH, PCO2, PO2, HCO3, FIO2 in the last 72 hours. 24 Hour Results: 
Recent Results (from the past 24 hour(s)) GLUCOSE, POC Collection Time: 01/31/21  7:46 PM  
Result Value Ref Range Glucose (POC) 242 (H) 65 - 100 mg/dL Performed by Jenny Valero, POC Collection Time: 02/01/21  7:55 AM  
Result Value Ref Range Glucose (POC) 294 (H) 65 - 100 mg/dL Performed by Chelsey Rivera, POC Collection Time: 02/01/21 11:14 AM  
Result Value Ref Range Glucose (POC) 339 (H) 65 - 100 mg/dL Performed by Jorje Sheriff   
CBC WITH AUTOMATED DIFF Collection Time: 02/01/21  1:14 PM  
Result Value Ref Range WBC 9.3 3.6 - 11.0 K/uL  
 RBC 4.58 3.80 - 5.20 M/uL  
 HGB 11.9 11.5 - 16.0 g/dL HCT 35.8 35.0 - 47.0 % MCV 78.2 (L) 80.0 - 99.0 FL  
 MCH 26.0 26.0 - 34.0 PG  
 MCHC 33.2 30.0 - 36.5 g/dL  
 RDW 15.0 (H) 11.5 - 14.5 % PLATELET 384 667 - 396 K/uL MPV 11.0 8.9 - 12.9 FL  
 NEUTROPHILS 85 (H) 32 - 75 % LYMPHOCYTES 7 (L) 12 - 49 % MONOCYTES 7 5 - 13 % EOSINOPHILS 0 0 - 7 % BASOPHILS 0 0 - 1 % IMMATURE GRANULOCYTES 1 (H) 0.0 - 0.5 % ABS. NEUTROPHILS 7.9 1.8 - 8.0 K/UL  
 ABS. LYMPHOCYTES 0.7 (L) 0.8 - 3.5 K/UL  
 ABS. MONOCYTES 0.7 0.0 - 1.0 K/UL ABS. EOSINOPHILS 0.0 0.0 - 0.4 K/UL  
 ABS. BASOPHILS 0.0 0.0 - 0.1 K/UL  
 ABS. IMM. GRANS. 0.1 (H) 0.00 - 0.04 K/UL  
 DF AUTOMATED    
BNP Collection Time: 02/01/21  1:14 PM  
Result Value Ref Range NT pro-BNP 1,026 (H) <450 pg/mL RENAL FUNCTION PANEL Collection Time: 02/01/21  1:14 PM  
Result Value Ref Range Sodium 141 136 - 145 mmol/L Potassium 3.9 3.5 - 5.1 mmol/L Chloride 106 97 - 108 mmol/L  
 CO2 25 21 - 32 mmol/L Anion gap 10 5 - 15 mmol/L Glucose 317 (H) 65 - 100 mg/dL BUN 55 (H) 6 - 20 mg/dL Creatinine 1.56 (H) 0.55 - 1.02 mg/dL BUN/Creatinine ratio 35 (H) 12 - 20 GFR est AA 39 (L) >60 ml/min/1.73m2 GFR est non-AA 32 (L) >60 ml/min/1.73m2 Calcium 9.1 8.5 - 10.1 mg/dL Phosphorus 3.1 2.6 - 4.7 mg/dL Albumin 3.2 (L) 3.5 - 5.0 g/dL GLUCOSE, POC Collection Time: 02/01/21  3:35 PM  
Result Value Ref Range Glucose (POC) 254 (H) 65 - 100 mg/dL Performed by Henry Islas   
 
 
XR CHEST PORT Final Result CT CHEST WO CONT Final Result Pronounced patchy diffuse bilateral pneumonitis XR CHEST PORT Final Result Assessment: 
COVID-19 with pneumonitis, on remdesivir Decadron Acute respiratory failure with hypoxia, patient did decline BiPAP currently on high high flow oxygen followed by pulmonology 
  
Diabetes mellitus type 2: uncontrolled, may due to steroid use 
  
COPD without exacerbation 
  
Chronic kidney disease stage III good urine output 
  
Chronic heart failure, details unknown,IV Lasix 
  
History of CVA, wheelchair-bound 
  
Hypertension fair controlled Plan: 
 
Continue IV remdesivir, Decadron, IV antibiotics as ordered Added moderate sliding scale protocol Started Lantus 15 units at bedtime Symptomatic management, avoid renal toxicity medicine Check renal function BMP in the morning PTOT as ordered, frequent position change to avoid  bedsores DVT stress ulcer prophylaxis CODE STATUS is DNR Total time spent with patient: 30 minutes.  
 
Maite Gale MD

## 2021-02-01 NOTE — PROGRESS NOTES
Pulmonary, Critical Care Note Name: Servando Navarro MRN: [id number] : 1940 Hospital: [address] [place] [address] Date: 2021  Admission date: 2021 Hospital Day: 5 Subjective/Interval History:  
Seen in the emergency room. Patient was just in the hospital last week with COVID-19 positive hypoxia pneumonitis. She was treated with Decadron Actemra and 1 dose ivermectin. She was weaned to room air with decreasing CRP at the time of discharge. She did well for the first 2 or 3 days but about 2 days ago started noticing worsening shortness of breath again. She had low-grade fever last night 99 4 came to the emergency room was found to have worsening hypoxia and is now on nonrebreather. Chest x-ray shows marked worsening diffuse infiltrates she states she has had some cough mostly nonproductive had nausea vomiting diarrhea states her appetite is unchanged and still has a sense of taste of interest her CRP at the time of discharge 0.87 and today 1.35 her blood cultures from  showed 4 of 4 with coagulase-negative staph aureus. She was an extremely difficult stick and it was felt that this was probably 1 stick in for blood culture bottles and represented skin contaminant. Blood culture from  at the time of discharge was no growth  progressively worsening hypoxia had to place on 100% nasal high flow. She now is running oxygen saturation of 88%. High flow rate increased to 70 L and saturation only 90%  remains on nasal high flow at 100% operations nonlabored sitting at rest she has stated she does not want to be intubated or to use BiPAP Hospital Problems  Date Reviewed: 10/6/2020 Codes Class Noted POA COVID-19 ICD-10-CM: U07.1 ICD-9-CM: 079.89  2021 Unknown IMPRESSION:  
1. Acute Hypoxic respiratory failure 2. Diffuse pulmonary infiltrates pulmonary edema versus community-acquired pneumonia/COVID-19 pneumonitis 3.  Recent COVID-19 infection 4. History CHF 5. History diabetes 6. Chronic kidney disease Body mass index is 36.05 kg/m². RECOMMENDATIONS/PLAN:  
1. Continue Decadron has completed Remdesivir and received ivermectin and Actemra recently 2. Continue Lasix 3. Continue on inhaled albuterol and Symbicort [x] High complexity decision making was performed [x] See my orders for details Subjective/Initial History:  
 
I was asked by Annie Ross MD to see Devon Bernstein  a 78 y.o.  female in consultation for a chief complaint of hypoxic respiratory failure Covid pneumonitis versus pulmonary edema Allergies Allergen Reactions  Sulfa (Sulfonamide Antibiotics) Angioedema  Contrast Dye [Iodine] Itching MAR reviewed and pertinent medications noted or modified as needed Current Facility-Administered Medications Medication  vancomycin (VANCOCIN) 750 mg in 0.9% sodium chloride 250 mL (VIAL-MATE)  [START ON 2/2/2021] VANCOMYCIN INFORMATION NOTE  lidocaine (XYLOCAINE) 4 % cream  
 furosemide (LASIX) injection 60 mg  
 heparin (porcine) injection 5,000 Units  glucose chewable tablet 16 g  
 glucagon (GLUCAGEN) injection 1 mg  dextrose (D50W) injection syrg 12.5-25 g  
 insulin lispro (HUMALOG) injection  hydrOXYzine pamoate (VISTARIL) capsule 50 mg  
 sodium chloride (NS) flush 5-40 mL  sodium chloride (NS) flush 5-40 mL  acetaminophen (TYLENOL) tablet 650 mg Or  acetaminophen (TYLENOL) suppository 650 mg  cholecalciferol (VITAMIN D3) (1000 Units /25 mcg) tablet 2,000 Units  albuterol (PROVENTIL HFA, VENTOLIN HFA, PROAIR HFA) inhaler 2 Puff  budesonide-formoteroL (SYMBICORT) 160-4.5 mcg/actuation HFA inhaler 2 Puff  VANCOMYCIN INFORMATION NOTE 1 Each  
 dexamethasone (DECADRON) 4 mg/mL injection 4 mg Patient PCP: Kayleen Lindsay MD 
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 She also has no past medical history of History of abuse in adulthood or History of abuse in childhood. PSH:   has a past surgical history that includes hx hysterectomy. FHX: Family history is unknown by patient. SHX:  reports that she has quit smoking. She has never used smokeless tobacco. She reports that she does not drink alcohol or use drugs. Systemic review: 
General states her weight has been same for the last week just noticed fever yesterday with worsening shortness of breath Eyes no double vision or momentary blindness ENT no facial pain over the sinuses or drainage Endocrinologic she has diabetes denies polyuria polydipsia Musculoskeletal some muscle aches and pains unchanged from her baseline no swollen tender joints Neurologic no seizures or syncope Gastrointestinal no nausea or vomiting states her appetite has been unchanged and no change in her sense of taste Genitourinary no pain or discomfort on urination no bleeding Vascular denies ankle edema chest pain diaphoresis or nocturia Respiratory as mentioned worsening shortness of breath minimal cough no sputum production low-grade fever Objective:  
 
Vital Signs: Telemetry:    normal sinus rhythm Intake/Output:  
Visit Vitals BP (!) 142/64 (BP 1 Location: Right lower arm) Pulse 100 Temp 98.9 °F (37.2 °C) Resp 22 Ht 5' 4\" (1.626 m) Wt 95.3 kg (210 lb) SpO2 93% BMI 36.05 kg/m² Temp (24hrs), Av.3 °F (36.8 °C), Min:97.7 °F (36.5 °C), Max:98.9 °F (37.2 °C) O2 Device: Heated, Hi flow nasal cannula O2 Flow Rate (L/min): 70 l/min Wt Readings from Last 4 Encounters:  
21 95.3 kg (210 lb)  
21 127 kg (280 lb)  No intake or output data in the 24 hours ending 02/01/21 1403 Last shift:      No intake/output data recorded. Last 3 shifts: 01/30 1901 - 02/01 0700 In: 100 [P.O.:100] Out: - Physical Exam:  
General:  female; mild distress with tachypnea no accessory muscle use HEENT: NCAT, oral mucosa clear Eyes: anicteric; conjunctiva clear extraocular movements intact Neck: no nodes, obesity obscures determination of JVD Chest: no deformity,  
Cardiac: Regular rate and rhythm no definite murmur Lungs: Rapid shallow respirations clear anteriorly and laterally posterior rales are present Abd: Obese soft positive bowel sounds no tenderness Ext: no edema; no joint swelling; No clubbing : clear urine Neuro: Awake alert seems anxious speech is relatively clear is hemiplegic on the left side moves the right side normally Psych- no agitation, oriented to person;  
Skin: warm, dry, no cyanosis; 
Pulses: Brachial and radial pulses are intact hard to find her femoral due to her obesity Capillary: Capillary refill Labs: 
 
Recent Labs  
  01/30/21 
1811 WBC 7.1 HGB 11.5  Recent Labs 02/01/21 
1314 01/30/21 
0553  141  
K 3.9 3.9  104 CO2 25 30 * 242* BUN 55* 47* CREA 1.56* 1.33* CA 9.1 9.2 PHOS 3.1 3.8 ALB 3.2* 3.1* Nasal high flow 100% FiO2 with flow rate 70 L/min with oxygen saturation 98%  CRP 1.35, 2.87, 4.87 
  280 
 ferritin 333, 235 
1/20 4of4 blood cultures with coagulase-negative staph aureus 1/26 blood culture no growth No results for input(s): CPK, CKNDX, TROIQ in the last 72 hours. No lab exists for component: CPKMB Lab Results Component Value Date/Time  Culture result: No growth 4 days 01/28/2021 06:30 AM  
 Culture result: No growth 6 days 01/26/2021 11:55 AM  
 Culture result: (A) 01/22/2021 11:45 AM  
  Staphylococcus species, coagulase negative MULTIPLE COLONY TYPES/STRAINS growing in all 4 bottles drawn Culture result:  01/22/2021 11:45 AM  
  Gram pos cocci in clusters 
growing in 2 of 4 bottles drawn Dr. Hung Woodson via Ms. Navarrete Pipes on 1.23.21 at 15:25 by tlw Imaging: CXR Results  (Last 48 hours) 01/28/21 0610  XR CHEST PORT Final result Narrative:  1 view comparison 22nd Moderate patchy interstitial infiltrates, midportion of right greater than left  
lung with hypoinflation overall. No effusion or pneumothorax. Normal heart and  
Mediastinum Agree with above diffuse patchy infiltrates marked worsening from previous exam  
  
  
 
Results from [facility] [place] Encounter encounter on 01/28/21 XR CHEST PORT Narrative 1 view comparison yesterday Little change in patchy mixed infiltrates throughout each lung, lower volumes 
overall. No effusion. Normal heart and mediastinum XR CHEST PORT Narrative 1 view comparison 22nd Moderate patchy interstitial infiltrates, midportion of right greater than left 
lung with hypoinflation overall. No effusion or pneumothorax. Normal heart and 
mediastinum Results from [facility] Encounter encounter on 01/22/21 XR CHEST SNGL V  
 Narrative 1 new comparison March 10 Impression Impression: The cardiomediastinal silhouette is appropriate for age, technique, 
and lung expansion. Pulmonary vasculature is not congested. The lungs are 
essentially clear. No effusion or pneumothorax is seen. Results from [facility] Encounter encounter on 01/28/21 CT CHEST WO CONT Narrative Noncontrast study shows pronounced patchy variable density groundglass 
opacification and lesser consolidation throughout much of each lung, relatively 
sparing the right lower lobe. Central airways are open. No mediastinal or hilar 
adenopathy. Normal caliber aorta. No pleural pericardial effusion. No 
significant upper abdominal findings Impression Pronounced patchy diffuse bilateral pneumonitis · Discussion: Marked worsening hypoxia and pulmonary infiltrates. CRP is down which certainly goes against worsening Covid pneumonitis. Will give Lasix as mentioned above and follow oxygenation electrolytes and renal function. Agree with empiric treatment for Covid to include Decadron and remdesivir have spoken with infectious disease and will add vancomycin until repeat blood cultures are finalized · 1/29 worsening hypoxia will place on noninvasive ventilator and continue treatment for COVID-19 as well as IV Lasix 1/30 received Lasix yesterday oxygenation is improved. She has stated again that she does not want to be on life support machines or to be intubated. We will continue Lasix for diuresis. So far she is doing well today Cierra Castro MD

## 2021-02-01 NOTE — PROGRESS NOTES
Patient has been accepted with [facility] when ready for discharge.

## 2021-02-01 NOTE — PROGRESS NOTES
OCCUPATIONAL THERAPY EVALUATION/DISCHARGE Patient: Anali Meyers (50 y.o. female) Date: 2/1/2021 Primary Diagnosis: COVID-19 [U07.1] Precautions: COVID positive ASSESSMENT Based on the objective data described below, the patient presents at functional baseline dependence for self care tasks and functional mobility/transfers. Patient lives at home with family members and uses a haresh lift for transfers to manual wheelchair, DME includes: haresh lift, wheelchair, and hospital bed. Patient wears oxygen at baseline, unable to state number of liters, currently on hi-flow nasal cannula. Patient reports requiring A for all self care tasks and does not ambulate or stand to transfer. Patient does report sitting EOB occasionally with assistance. Patient max A x2 bed mobility, sup <> sit and scooting. Patient min A simulated grooming, total A bed level toileting and total A LE dressing. At this time, patient is at functional baseline independence and will be discharged from OT services following eval as patient has no acute OT needs. If functional status changes, will be happy to reassess. Current Level of Function (ADLs/self-care): min to total A ADL tasks Other factors to consider for discharge: PLOF, family support, COVID + PLAN : 
Recommend with staff: frequent turning for pressure relief Recommendation for discharge: (in order for the patient to meet his/her long term goals) Home with prior level of care This discharge recommendation: 
Has been made in collaboration with the attending provider and/or case management IF patient discharges home will need the following DME: patient owns DME required for discharge SUBJECTIVE:  
Patient stated My family helps me with everything.  OBJECTIVE DATA SUMMARY:  
HISTORY:  
Past Medical History:  
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  
 History of CVA (cerebrovascular accident) 10/5/2020  Morbid obesity ([address] 10/5/2020  Pure hypercholesterolemia 10/5/2020  Urinary incontinence 10/5/2020 Past Surgical History:  
Procedure Laterality Date  HX HYSTERECTOMY Prior Level of Function/Environment/Context: dependent mobility using haresh lift and manual wheelchair Expanded or extensive additional review of patient history:  
Home Situation Home Environment: Private residence Wheelchair Ramp: Yes One/Two Story Residence: One story Living Alone: No 
Support Systems: Child(sebastian), Family member(s) Patient Expects to be Discharged to[de-identified] Private residence Current DME Used/Available at Home: Hospital bed, Wheelchair(haresh lift ) EXAMINATION OF PERFORMANCE DEFICITS: 
Cognitive/Behavioral Status: 
Neurologic State: Alert;Confused Orientation Level: Oriented to person;Oriented to place; Disoriented to time;Disoriented to situation Cognition: Follows commands; Impaired decision making;Poor safety awareness;Decreased attention/concentration Skin: intact where visible Edema: generalized swelling Hearing: Auditory Auditory Impairment: None Vision/Perceptual:   
No c/o deficits at time of eval 
 
Range of Motion: 
AROM: Grossly decreased, non-functional 
PROM: Generally decreased, functional 
 
Strength: 
Strength: Grossly decreased, non-functional 
  
RUE Strength Observation: grossly observed to be 3+/5 LUE Strength Observation: grossly observed to be 3+/5 Tone & Sensation: 
Tone: normal 
 
Balance: 
Sitting: Impaired; With support Sitting - Static: Poor (constant support); Prop sitting Sitting - Dynamic: Poor (constant support); Prop sitting Functional Mobility and Transfers for ADLs: 
Bed Mobility: 
Rolling: Maximum assistance;Assist x2 Supine to Sit: Maximum assistance;Assist x2 Sit to Supine: Maximum assistance;Assist x2 Scooting: Maximum assistance; Total assistance;Assist x2 Transfers: ADL Assessment: 
 
Oral Facial Hygiene/Grooming: Minimum assistance(simulated) Lower Body Dressing: Total assistance Toileting: Total assistance ADL Intervention and task modifications: 
 
Grooming Grooming Assistance: Minimum assistance(simulated) Position Performed: Other (comment)(semi supine) Lower Body Dressing Assistance Dressing Assistance: Total assistance(dependent) Socks: Total assistance (dependent) Position Performed: Supine Toileting Toileting Assistance: Total assistance(dependent) Bladder Hygiene: Total assistance (dependent) Bowel Hygiene: Total assistance (dependent) Clothing Management: Total assistance (dependent) Therapeutic Exercise: 
No UE HEP needs at this time Functional Measure: 
 
[address] [place] [address] AM-PACTM \"6 Clicks\" Daily Activity Inpatient Short Form How much help from another person does the patient currently need. .. Total; A Lot A Little None 1. Putting on and taking off regular lower body clothing? [x]  1 []  2 []  3 []  4  
2. Bathing (including washing, rinsing, drying)? []  1 [x]  2 []  3 []  4  
3. Toileting, which includes using toilet, bedpan or urinal? [x] 1 []  2 []  3 []  4  
4. Putting on and taking off regular upper body clothing? []  1 []  2 [x]  3 []  4  
5. Taking care of personal grooming such as brushing teeth? []  1 []  2 [x]  3 []  4  
6. Eating meals? []  1 []  2 [x]  3 []  4  
© 2007, Trustees of [address] [place] [address], under license to [employer]. All rights reserved Score: 13/24 Interpretation of Tool:  Represents clinically-significant functional categories (i.e. Activities of daily living). Percentage of Impairment CH 
 
0% 
 CI 
 
1-19% CJ 
 
20-39% CK 
 
40-59% CL 
 
60-79% CM 
 
80-99% CN  
 
100% AMPAC Score 6-24 24 23 20-22 15-19 10-14 7-9 6 Occupational Therapy Evaluation Charge Determination History Examination Decision-Making LOW Complexity : Brief history review  MEDIUM Complexity : 3-5 performance deficits relating to physical, cognitive , or psychosocial skils that result in activity limitations and / or participation restrictions LOW Complexity : No comorbidities that affect functional and no verbal or physical assistance needed to complete eval tasks Based on the above components, the patient evaluation is determined to be of the following complexity level: LOW Pain Ratin/10 Activity Tolerance:  
Fair, desaturates with exertion and requires oxygen and requires frequent rest breaks After treatment patient left in no apparent distress:   
Supine in bed, Call bell within reach, Bed / chair alarm activated, Side rails x 3 and nursing present in room COMMUNICATION/EDUCATION:  
The patients plan of care was discussed with: Physical therapist and Registered nurse. OT/PT sessions occurred together for increased patient and clinician safety as pt requires A of 2 for mobility at this time. Thank you for this referral. 
Conner Rivas, OTR/L Time Calculation: 20 mins

## 2021-02-01 NOTE — PROGRESS NOTES
PHYSICAL THERAPY EVALUATION Patient: Karmen Randolph (87 y.o. female) Date: 2021 Primary Diagnosis: COVID-19 [U07.1] Precautions: bed bound ASSESSMENT Based on the objective data described below, the patient presents with Decr ROM, strength, bed mobility, transfers, balance, gait, activity tolerance, safety awareness, and functional mobility. Pt agreeable to PT evaluation, A&O x 2 oriented to person, , and place. Pt admitted 21 following onset of shortness of breath for 2 weeks and elevated temperature. Pt was recently admitted at [facility] -21 for hypoxic Covid PNA. Pt tested Covid (+). Pmx: CHF, DM, CVA, HTN, arthritis. Pt states they live with family in 1 lvl home with ramp entrance. Pt reports assist PLOF with dressing/bathing and Max/total A with mobility using WC and haresh lift for transfers. Pt is seen semi-supine on 70Lmin HiFlow at 100%. Pt completed bed mobility with Max A/total Ax2, unable to complete full sit at EOB due to reported LBP. Pt has poor activity tolerance, tolerated minimal mobility and recovered in supine. Pt educated on LE HEP to complete throughout the day to prevent decline. Pt presents at baseline req Max/total A for ADLs and mobility. Please re-order PT evaluation if functional baseline changes. PT d/c recc return home with prior level of care when medically appropriate. Current Level of Function Impacting Discharge (mobility/balance): Max A/Total A at baseline, Patient will benefit from skilled therapy intervention to address the above noted impairments. PLAN : 
Recommendation for discharge: (in order for the patient to meet his/her long term goals) PT d/c recc return home with prior level of care when medically appropriate. This discharge recommendation: 
Has been made in collaboration with the attending provider and/or case management IF patient discharges home will need the following DME: patient owns DME required for discharge SUBJECTIVE:  
Patient stated I want to go home.  OBJECTIVE DATA SUMMARY:  
HISTORY:   
Past Medical History:  
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  
 History of CVA (cerebrovascular accident) 10/5/2020  Morbid obesity ([facility] 10/5/2020  Pure hypercholesterolemia 10/5/2020  Urinary incontinence 10/5/2020 Past Surgical History:  
Procedure Laterality Date  HX HYSTERECTOMY Personal factors and/or comorbidities impacting plan of care: complicated medical history, multiple comorbidities, bed bound Home Situation Home Environment: Private residence Wheelchair Ramp: Yes One/Two Story Residence: One story Living Alone: No 
Support Systems: Child(sebastian), Family member(s) Patient Expects to be Discharged to[de-identified] Private residence Current DME Used/Available at Home: Hospital bed, Wheelchair(haresh lift ) EXAMINATION/PRESENTATION/DECISION MAKING:  
Critical Behavior: 
Neurologic State: Alert, Confused Orientation Level: Oriented to person, Oriented to place, Disoriented to time, Disoriented to situation Cognition: Follows commands, Impaired decision making, Poor safety awareness, Decreased attention/concentration Hearing: Auditory Auditory Impairment: None Range Of Motion: 
AROM: Grossly decreased, non-functional 
 PROM: Generally decreased, functional 
  Strength:   
Strength: Grossly decreased, non-functional 
  
 Functional Mobility: 
Bed Mobility: 
Rolling: Maximum assistance;Assist x2 Supine to Sit: Maximum assistance;Assist x2 Sit to Supine: Maximum assistance;Assist x2 Scooting: Maximum assistance; Total assistance;Assist x2 Transfers: 
 Balance:  
Sitting: Impaired; With support Sitting - Static: Poor (constant support); Prop sitting Sitting - Dynamic: Poor (constant support); Prop sitting Pt is seen semi-supine on 70Lmin HiFlow at 100%. Pt completed bed mobility with Max A/total Ax2, unable to complete full sit at EOB due to reported LBP. Pt req max cues for hand placement, sequencing, and safety to prevent LOB/falls. Pt has poor activity tolerance, tolerated minimal mobility and recovered in supine. Pt repositioned to [facility] with Total A x2 to scoot at bedlevel. Pt educated on LE HEP to complete throughout the day to prevent decline. Pt presents at baseline req Max/total A for ADLs and mobility. Functional Measure: 
[address] [place] [id number] AM-PAC 6 Clicks Basic Mobility Inpatient Short Form How much difficulty does the patient currently have. .. Unable A Lot A Little None 1. Turning over in bed (including adjusting bedclothes, sheets and blankets)? [] 1   [x] 2   [] 3   [] 4  
2. Sitting down on and standing up from a chair with arms ( e.g., wheelchair, bedside commode, etc.)   [x] 1   [] 2   [] 3   [] 4  
3. Moving from lying on back to sitting on the side of the bed? [] 1   [x] 2   [] 3   [] 4 How much help from another person does the patient currently need. .. Total A Lot A Little None 4. Moving to and from a bed to a chair (including a wheelchair)? [x] 1   [] 2   [] 3   [] 4  
5. Need to walk in hospital room? [x] 1   [] 2   [] 3   [] 4  
6. Climbing 3-5 steps with a railing? [x] 1   [] 2   [] 3   [] 4  
© 2007, Trustees of [address] [place] [address] [address] [id number], under license to [employer]. All rights reserved Score:  Initial: 8 Most Recent: X (Date: -- ) Interpretation of Tool:  Represents activities that are increasingly more difficult (i.e. Bed mobility, Transfers, Gait). Score 24 23 22-20 19-15 14-10 9-7 6 Modifier CH CI CJ CK CL CM CN Physical Therapy Evaluation Charge Determination History Examination Presentation Decision-Making HIGH Complexity :3+ comorbidities / personal factors will impact the outcome/ POC  HIGH Complexity : 4+ Standardized tests and measures addressing body structure, function, activity limitation and / or participation in recreation  MEDIUM Complexity : Evolving with changing characteristics  Other Functional Measure [facility] 6 8 Based on the above components, the patient evaluation is determined to be of the following complexity level: MEDIUM Pain Rating: LBP when attempting sit EOB Activity Tolerance:  
Poor, desaturates with exertion and requires oxygen and requires frequent rest breaks Please refer to the flowsheet for vital signs taken during this treatment. After treatment patient left in no apparent distress:  
Supine in bed, Call bell within reach, Bed / chair alarm activated and Side rails x 3 
 
COMMUNICATION/EDUCATION:  
The patients plan of care was discussed with: Occupational therapist and Registered nurse. PT/OT sessions occurred together for increased safety of pt and clinician. Fall prevention education was provided and the patient/caregiver indicated understanding. and Patient/family have participated as able in goal setting and plan of care. Thank you for this referral. 
Pino Hernandez, PT Time Calculation: 20 mins

## 2021-02-01 NOTE — PROGRESS NOTES
Reason for Readmission:     COVID-19 RUR Score/Risk Level:     18 PCP: First and Last name:  Benjamín Blissody Name of Practice: [facility] Are you a current patient: Yes/No:  yes Approximate date of last visit:  
 Can you participate in a virtual visit with your PCP:  
 
Is a Care Conference indicated:   N/A Did you attend your follow up appointment (s): If not, why not:  Readmitted prior to PCP appointment Resources/supports as identified by patient/family:    
    
Top Challenges facing patient (as identified by patient/family and CM): None at this time Finances/Medication cost?      
Transportation Support system or lack thereof? Living arrangements? Self-care/ADLs/Cognition? Current Advanced Directive/Advance Care Plan:   
        
Sharif Carlson [phone]. Plan for utilizing home health: [facility] in the home prior to admission Transition of Care Plan:    Based on readmission, the patient's previous Plan of Care 
 has been evaluated and/or modified. The current Transition of Care Plan is:        
 
Patient has a hospital bed, wheel chair, and haresh lift at home. Patient lives with her daughter. Patient uses  3L via Nasal cannula continuously at home through [employer].  Patient would like to go home with home health on discharge from hospital.  CM sent referral to [address] [place] [address],[address], Pending acceptance.

## 2021-02-01 NOTE — PROGRESS NOTES
Infectious Disease Progress Note Subjective:  
Still w sig O2 requirements. Denies new complaints. Denies new complaints. Objective:  
Physical Exam:  
 
Visit Vitals /75 (BP 1 Location: Right upper arm, BP Patient Position: Supine) Pulse 90 Temp 98 °F (36.7 °C) Resp 22 Ht 5' 4\" (1.626 m) Wt 210 lb (95.3 kg) SpO2 93% BMI 36.05 kg/m² O2 Flow Rate (L/min): 70 l/min O2 Device: Heated, Hi flow nasal cannula Temp (24hrs), Av.2 °F (36.8 °C), Min:97.7 °F (36.5 °C), Max:98.9 °F (37.2 °C) No intake/output data recorded.  1901 -  0700 In: 100 [P.O.:100] Out: - General: NAD, alert, weak and lethargic HEENT: NAILA, Moist mucosa Lungs: Decreased at the bases no wheeze/rhonchi Heart: S1S2+, RRR, no murmur Abdo: Soft, NT, ND, +BS Exts: No edema, + pulses b/l  
Skin: No wounds, No rashes or lesions Data Review:  
   
Recent Days: 
Recent Labs 21 
1314 21 
0553 WBC 9.3 7.1 HGB 11.9 11.5 HCT 35.8 34.3*  
 214 Recent Labs 21 
1314 21 
0553 BUN 55* 47* CREA 1.56* 1.33* Lab Results Component Value Date/Time C-Reactive protein 1.85 (H) 2021 05:53 AM  
  
Microbiology - Blood Cx : Neg  
 
Diagnostics CXR Results  (Last 48 hours) None Assessment/Plan 1. Acute hypoxia due to COVID-19 pneumonitis, suspected CHF and CAP Remains on high flow at 100% FiO2. On day #5 of decadron and Remdesivir Persistent bilateral infiltrates on most recent CXR () Febrile with a normal WBC on routine labs Repeat CXR and CXR in AM, consider broadening antimicrobial coverage if worsening infiltrates 2. Staph epidermidis bacteremia: isolated from [facility] during last admission Repeat BC on  and  are neg D/cVancomycin since repeat blood cultures are staying negative 
  
3.  Suspected CHF exacerbation: No pulmonary edema on CXR or peripherally BNP of 609, remains on IV lasix  
  
4. H/o CVA w resulting ambulatory difficulty, wheel chair bound at home 5. Generalized weakness, increased lethargy  
  
Gwynneth Boast, MD 
 
2/1/2021

## 2021-02-02 NOTE — PROGRESS NOTES
On morning assessment pt. found to be very restless, tachycardic, rhythm appearing to be Afib, tachypneic. HR consistently between 130-160. SpO2 85-91% on 70L/min O2 100% FiO2. Dr. Darlene Coburn notified, orders received for 5mg lopressor, 2mg morphine Q3 PRN, and EKG. Medications administered. HR <110, SpO2 89% RR 24/min. Pt. Currently resting comfortably. Will continue to monitor.

## 2021-02-02 NOTE — WOUND CARE
Vishal technician called and confirmed that a [employer] [facility] B[facility]ric bed with air will deliver today.   I informed Teressa Ellsworth Rn.

## 2021-02-02 NOTE — PROGRESS NOTES
Hospitalist Progress Note Daily Progress Note: 2/2/2021 Subjective: The patient is seen for follow up. Earlier this morning patient was very restless and tachycardic. EKG obtained which shows sinus rhythm with frequent PACs. Saturations were in the 80s on 100% FiO2 high flow. She was given morphine earlier. Now resting more comfortably Heart rate now down in the 80s Problem List: 
Problem List as of 2/2/2021 Date Reviewed: 10/6/2020 Codes Class Noted - Resolved COVID-19 ICD-10-CM: U07.1 ICD-9-CM: 079.89  1/28/2021 - Present Acute respiratory failure due to COVID-19 [facility]) ICD-10-CM: U07.1, J96.00 
ICD-9-CM: 518.81, 079.89  1/22/2021 - Present Vitamin D deficiency ICD-10-CM: E55.9 ICD-9-CM: 268.9  10/6/2020 - Present History of CVA (cerebrovascular accident) ICD-10-CM: Z86.73 
ICD-9-CM: V12.54  10/5/2020 - Present Essential hypertension ICD-10-CM: I10 
ICD-9-CM: 401.9  10/5/2020 - Present Dependence on wheelchair ICD-10-CM: Z99.3 ICD-9-CM: V46.3  10/5/2020 - Present Morbid obesity ([facility] ICD-10-CM: E66.01 
ICD-9-CM: 278.01  10/5/2020 - Present Pure hypercholesterolemia ICD-10-CM: E78.00 ICD-9-CM: 272.0  10/5/2020 - Present Urinary incontinence ICD-10-CM: R32 
ICD-9-CM: 788.30  10/5/2020 - Present DM (diabetes mellitus), type 2 ([facility] [address] ICD-10-CM: E11.9 ICD-9-CM: 250.00  10/5/2020 - Present Medications reviewed Current Facility-Administered Medications Medication Dose Route Frequency  morphine injection 2 mg  2 mg IntraVENous Q3H PRN  
 insulin glargine (LANTUS) injection 13 Units  13 Units SubCUTAneous QHS  insulin lispro (HUMALOG) injection   SubCUTAneous AC&HS  
 dextrose (D50W) injection syrg 12.5-25 g  25-50 mL IntraVENous PRN  
 glimepiride (AMARYL) tablet 2 mg  2 mg Oral ACB  lidocaine (XYLOCAINE) 4 % cream   Topical BID  furosemide (LASIX) injection 60 mg  60 mg IntraVENous DAILY  heparin (porcine) injection 5,000 Units  5,000 Units SubCUTAneous Q12H  
 glucose chewable tablet 16 g  4 Tab Oral PRN  
 glucagon (GLUCAGEN) injection 1 mg  1 mg IntraMUSCular PRN  
 hydrOXYzine pamoate (VISTARIL) capsule 50 mg  50 mg Oral QID PRN  
 sodium chloride (NS) flush 5-40 mL  5-40 mL IntraVENous Q8H  
 sodium chloride (NS) flush 5-40 mL  5-40 mL IntraVENous PRN  
 acetaminophen (TYLENOL) tablet 650 mg  650 mg Oral Q6H PRN Or  
 acetaminophen (TYLENOL) suppository 650 mg  650 mg Rectal Q6H PRN  cholecalciferol (VITAMIN D3) (1000 Units /25 mcg) tablet 2,000 Units  2,000 Units Oral DAILY  albuterol (PROVENTIL HFA, VENTOLIN HFA, PROAIR HFA) inhaler 2 Puff  2 Puff Inhalation Q6H RT  
 budesonide-formoteroL (SYMBICORT) 160-4.5 mcg/actuation HFA inhaler 2 Puff  2 Puff Inhalation BID RT  
 dexamethasone (DECADRON) 4 mg/mL injection 4 mg  4 mg IntraVENous Q6H Review of Systems: A comprehensive review of systems was negative except for that written in the HPI. Objective:  
Physical Exam:  
 
Visit Vitals /85 (BP 1 Location: Right lower arm, BP Patient Position: Supine) Pulse (!) 134 Temp 98.4 °F (36.9 °C) Resp 24 Ht 5' 4\" (1.626 m) Wt 95.3 kg (210 lb) SpO2 (!) 88% BMI 36.05 kg/m² O2 Flow Rate (L/min): 70 l/min O2 Device: Heated, Hi flow nasal cannula Temp (24hrs), Av.1 °F (36.7 °C), Min:98 °F (36.7 °C), Max:98.4 °F (36.9 °C) No intake/output data recorded. No intake/output data recorded. General:   Awake and alert. High flow nasal cannula in place, obese Lungs:    Crackles bilateral  
Chest wall:  No tenderness or deformity. Heart:  Regular rate and rhythm, S1, S2 normal, no murmur, click, rub or gallop. Abdomen:   Soft, non-tender. Bowel sounds normal. No masses,  No organomegaly. Extremities: Extremities normal, atraumatic, no cyanosis or edema. Pulses: 2+ and symmetric all extremities.   
Skin: Skin color, texture, turgor normal. No rashes or lesions Neurologic: CNII-XII intact. No gross focal deficits Data Review:  
   
Recent Days: 
Recent Labs 02/01/21 
1314 WBC 9.3 HGB 11.9 HCT 35.8  Recent Labs 02/01/21 
1314   
K 3.9  CO2 25 * BUN 55* CREA 1.56* CA 9.1 PHOS 3.1 ALB 3.2* No results for input(s): PH, PCO2, PO2, HCO3, FIO2 in the last 72 hours. 24 Hour Results: 
Recent Results (from the past 24 hour(s)) GLUCOSE, POC Collection Time: 02/01/21 11:14 AM  
Result Value Ref Range Glucose (POC) 339 (H) 65 - 100 mg/dL Performed by Samantha Bella   
CBC WITH AUTOMATED DIFF Collection Time: 02/01/21  1:14 PM  
Result Value Ref Range WBC 9.3 3.6 - 11.0 K/uL  
 RBC 4.58 3.80 - 5.20 M/uL  
 HGB 11.9 11.5 - 16.0 g/dL HCT 35.8 35.0 - 47.0 % MCV 78.2 (L) 80.0 - 99.0 FL  
 MCH 26.0 26.0 - 34.0 PG  
 MCHC 33.2 30.0 - 36.5 g/dL  
 RDW 15.0 (H) 11.5 - 14.5 % PLATELET 343 242 - 001 K/uL MPV 11.0 8.9 - 12.9 FL  
 NEUTROPHILS 85 (H) 32 - 75 % LYMPHOCYTES 7 (L) 12 - 49 % MONOCYTES 7 5 - 13 % EOSINOPHILS 0 0 - 7 % BASOPHILS 0 0 - 1 % IMMATURE GRANULOCYTES 1 (H) 0.0 - 0.5 % ABS. NEUTROPHILS 7.9 1.8 - 8.0 K/UL  
 ABS. LYMPHOCYTES 0.7 (L) 0.8 - 3.5 K/UL  
 ABS. MONOCYTES 0.7 0.0 - 1.0 K/UL  
 ABS. EOSINOPHILS 0.0 0.0 - 0.4 K/UL  
 ABS. BASOPHILS 0.0 0.0 - 0.1 K/UL  
 ABS. IMM. GRANS. 0.1 (H) 0.00 - 0.04 K/UL  
 DF AUTOMATED    
BNP Collection Time: 02/01/21  1:14 PM  
Result Value Ref Range NT pro-BNP 1,026 (H) <450 pg/mL RENAL FUNCTION PANEL Collection Time: 02/01/21  1:14 PM  
Result Value Ref Range Sodium 141 136 - 145 mmol/L Potassium 3.9 3.5 - 5.1 mmol/L Chloride 106 97 - 108 mmol/L  
 CO2 25 21 - 32 mmol/L Anion gap 10 5 - 15 mmol/L Glucose 317 (H) 65 - 100 mg/dL BUN 55 (H) 6 - 20 mg/dL Creatinine 1.56 (H) 0.55 - 1.02 mg/dL BUN/Creatinine ratio 35 (H) 12 - 20  GFR est AA 39 (L) >60 ml/min/1.73m2 GFR est non-AA 32 (L) >60 ml/min/1.73m2 Calcium 9.1 8.5 - 10.1 mg/dL Phosphorus 3.1 2.6 - 4.7 mg/dL Albumin 3.2 (L) 3.5 - 5.0 g/dL GLUCOSE, POC Collection Time: 02/01/21  3:35 PM  
Result Value Ref Range Glucose (POC) 254 (H) 65 - 100 mg/dL Performed by Celestino Marinelli, POC Collection Time: 02/01/21  7:43 PM  
Result Value Ref Range Glucose (POC) 241 (H) 65 - 100 mg/dL Performed by [address], POC Collection Time: 02/02/21  8:46 AM  
Result Value Ref Range Glucose (POC) 239 (H) 65 - 100 mg/dL Performed by Marybel Ryan   
 
 
XR CHEST PORT Final Result CT CHEST WO CONT Final Result Pronounced patchy diffuse bilateral pneumonitis XR CHEST PORT Final Result Assessment: 
COVID-19 with pneumonitis, completed remdesivir, on Decadron Acute respiratory failure with hypoxia, continues on high flow oxygen. She declined BiPAP Diabetes mellitus type 2: uncontrolled, may due to steroid use 
  
COPD without exacerbation 
  
Chronic kidney disease stage III good urine output 
  
Chronic heart failure, details unknown  
  
History of CVA, wheelchair-bound 
  
Hypertension fair controlled Plan: 
Continue Decadron, supplemental oxygen, supportive care Overall prognosis appears poor CODE STATUS is DNR Total time spent with patient: 30 minutes.  
 
Tomas Buenrostro MD

## 2021-02-02 NOTE — PROGRESS NOTES
Infectious Disease Progress Note Subjective:  
Restless and agitated earlier this morning, found to be in A. fib with RVR. Reported no complaint at a time of my assessment. Remains on high flow at 100% FIO2 Objective:  
Physical Exam:  
 
Visit Vitals BP (!) 140/82 (BP 1 Location: Right lower arm, BP Patient Position: Supine) Pulse 64 Temp 96.9 °F (36.1 °C) Resp 20 Ht 5' 4\" (1.626 m) Wt 276 lb 0.3 oz (125.2 kg) SpO2 96% BMI 47.38 kg/m² O2 Flow Rate (L/min): 70 l/min O2 Device: Hi flow nasal cannula, Heated Temp (24hrs), Av.8 °F (36.6 °C), Min:96.9 °F (36.1 °C), Max:98.4 °F (36.9 °C) 
   07 -  1900 In: -  
Out: 1500 [Urine:1500]   No intake/output data recorded. General: NAD, alert, weak and lethargic HEENT: NAILA, Moist mucosa Lungs: Decreased at the bases no wheeze/rhonchi Heart: S1S2+, RRR, no murmur Abdo: Soft, NT, ND, +BS Exts: No edema, + pulses b/l  
Skin: No wounds, No rashes or lesions Data Review:  
   
Recent Days: 
Recent Labs 21 
1314 WBC 9.3 HGB 11.9 HCT 35.8  Recent Labs 21 
1250 21 
1314 BUN 52* 55* CREA 1.50* 1.56* Lab Results Component Value Date/Time C-Reactive protein 1.85 (H) 2021 05:53 AM  
  
Microbiology - Blood Cx : Neg  
 
Diagnostics CXR Results  (Last 48 hours) None Assessment/Plan 1. Acute hypoxia due to COVID-19 pneumonitis, suspected CHF and CAP Remains on high flow at 100% FIO2, s/p 5 days of Remdesivir Persistent b/l infiltrates on most recent CXR () Intended to order CXR for today, but missed doing so, has been ordered for  Currently off antimicrobial therapy, remains on decadron 2. Staph epidermidis bacteremia: isolated from [facility] during last admission Repeat BC on  and  are neg, s/p 5 days of Vanc discontinued on  
  
3.  Suspected CHF exacerbation: Remains on IV lasix, repeat CXR in AM  
  
4. H/o CVA w resulting ambulatory difficulty, wheel chair bound at home 5. Afib w RVR: Rate controlled  
  
Mahsa Art MD 
 
2/2/2021

## 2021-02-02 NOTE — CONSULTS
Comprehensive Nutrition Assessment Type and Reason for Visit: Initial, Wound, Consult Nutrition Recommendations/Plan:  
 
Continue Diabetic diet Add Ensure HP TID Continue Benepro, reduce to BID Add reg pdg snack daily Nursing to document %meal and supplement intakes in I/Os Obtain updated measured wt as pt diuresed Nutrition Assessment:  Admitted for difficulty breathing, coughing, congestion, low grade fevers. Noted +COVID x1 week pta. Requiring HFNC. WCN assessed, RD consulted for PI. Ordered Diabetic diet. No intakes documented in EMR since admit. Briefly able to speak with pt with nsg assistance via phone, pt reported eating fair intakes ~50%. Liked grits this AM. Agreeable to Ensure HP rolando and rolando pdg. Unable to assess intakes of Benepro, will f/u for supplementation acceptance. Labs: BUN 52, Cr 1.50, , -261. Meds: dexamethasone, D3, Lasix, glimepiride, lantus, SSI, heparin, morphine. Malnutrition Assessment: 
Malnutrition Status: At risk for malnutrition (specify) Context:  Acute illness Findings of the 6 clinical characteristics of malnutrition:  
Energy Intake:  Mild decrease in energy intake (specify)(intakes ~50% while inpatient) Weight Loss:  No significant weight loss Body Fat Loss:  Unable to assess, Muscle Mass Loss:  Unable to assess, Fluid Accumulation:  Unable to assess, Nutrition Related Findings:  Unable to perform NFPE 2/2 iso precautions. Pt denied c/s difficulty. No n/v or c/d. BM documented 2/1. Bilat LE 2+pitting. Wounds:   
Stage II(to sacrum per [facility]) Current Nutrition Therapies: DIET DIABETIC CONSISTENT CARB Regular DIET NUTRITIONAL SUPPLEMENTS Breakfast, Dinner; Beneprotein (With applesauce) Anthropometric Measures: 
· Height:  5' 4.02\" (162.6 cm) · Current Body Wt:  125.2 kg (276 lb 0.3 oz) · Usual Body Wt:  (SANG) · Ideal Body Wt:  120 lbs:  230 % · Adjusted Body Weight:   ; Weight Adjustment for: (80.8kg; utilizing geriatric IBW 66kg) · BMI Category:  Obese class 3 (BMI 40.0 or greater) Wt Readings from Last 5 Encounters:  
02/02/21 125.2 kg (276 lb 0.3 oz) 01/22/21 127 kg (280 lb) Nutrition Diagnosis:  
· Increased nutrient needs related to increased demand for energy/nutrients as evidenced by wounds Nutrition Interventions:  
Food and/or Nutrient Delivery: Continue current diet, Modify oral nutrition supplement Nutrition Education and Counseling: No recommendations at this time Coordination of Nutrition Care: Continue to monitor while inpatient Goals: 
Meet >65% EENs x 5-7 days Improvement in skin integrity BG <180mg/dL Nutrition Monitoring and Evaluation:  
Behavioral-Environmental Outcomes: None identified Food/Nutrient Intake Outcomes: Food and nutrient intake, Supplement intake Physical Signs/Symptoms Outcomes: Biochemical data, GI status, Weight, Skin Discharge Planning: Too soon to determine Electronically signed by Misti Sewell on 2/2/2021 at 6:52 PM 
 
Contact: [phone]

## 2021-02-02 NOTE — PROGRESS NOTES
Pulmonary, Critical Care Note Name: Ольга Quinones MRN: [id number] : 1940 Hospital: [facility] Date: 2021  Admission date: 2021 Hospital Day: 6 Subjective/Interval History:  
Seen in the emergency room. Patient was just in the hospital last week with COVID-19 positive hypoxia pneumonitis. She was treated with Decadron Actemra and 1 dose ivermectin. She was weaned to room air with decreasing CRP at the time of discharge. She did well for the first 2 or 3 days but about 2 days ago started noticing worsening shortness of breath again. She had low-grade fever last night 99 4 came to the emergency room was found to have worsening hypoxia and is now on nonrebreather. Chest x-ray shows marked worsening diffuse infiltrates she states she has had some cough mostly nonproductive had nausea vomiting diarrhea states her appetite is unchanged and still has a sense of taste of interest her CRP at the time of discharge 0.87 and today 1.35 her blood cultures from  showed 4 of 4 with coagulase-negative staph aureus. She was an extremely difficult stick and it was felt that this was probably 1 stick in for blood culture bottles and represented skin contaminant. Blood culture from  at the time of discharge was no growth  progressively worsening hypoxia had to place on 100% nasal high flow. She now is running oxygen saturation of 88%. High flow rate increased to 70 L and saturation only 90% / remains on nasal high flow at 100% operations nonlabored sitting at rest she has stated she does not want to be intubated or to use BiPAP 
2/2 tachycardia with worsening oxygen desaturation today responded to Lopressor IV. EKG was rapid indeterminate rhythm. Hospital Problems  Date Reviewed: 10/6/2020 Codes Class Noted POA COVID-19 ICD-10-CM: U07.1 ICD-9-CM: 079.89  2021 Unknown IMPRESSION:  
1.  Acute Hypoxic respiratory failure remains on high flow nasal oxygen 100% currently with O2 sat 90 
2. Diffuse pulmonary infiltrates pulmonary edema versus community-acquired pneumonia/COVID-19 pneumonitis 3. Recent COVID-19 infection 4. History CHF 5. History diabetes 6. Chronic kidney disease Body mass index is 36.05 kg/m². RECOMMENDATIONS/PLAN:  
1. Continue Decadron has completed Remdesivir and received ivermectin and Actemra recently 2. Continue Lasix 3. Continue on inhaled albuterol and Symbicort [x] High complexity decision making was performed [x] See my orders for details Subjective/Initial History:  
 
I was asked by Wilma Velázquez MD to see Raymundo Gonzalez  a 78 y.o.  female in consultation for a chief complaint of hypoxic respiratory failure Covid pneumonitis versus pulmonary edema Allergies Allergen Reactions  Sulfa (Sulfonamide Antibiotics) Angioedema  Contrast Dye [Iodine] Itching MAR reviewed and pertinent medications noted or modified as needed Current Facility-Administered Medications Medication  morphine injection 2 mg  insulin glargine (LANTUS) injection 13 Units  insulin lispro (HUMALOG) injection  dextrose (D50W) injection syrg 12.5-25 g  
 glimepiride (AMARYL) tablet 2 mg  lidocaine (XYLOCAINE) 4 % cream  
 furosemide (LASIX) injection 60 mg  
 heparin (porcine) injection 5,000 Units  glucose chewable tablet 16 g  
 glucagon (GLUCAGEN) injection 1 mg  hydrOXYzine pamoate (VISTARIL) capsule 50 mg  
 sodium chloride (NS) flush 5-40 mL  sodium chloride (NS) flush 5-40 mL  acetaminophen (TYLENOL) tablet 650 mg Or  acetaminophen (TYLENOL) suppository 650 mg  cholecalciferol (VITAMIN D3) (1000 Units /25 mcg) tablet 2,000 Units  albuterol (PROVENTIL HFA, VENTOLIN HFA, PROAIR HFA) inhaler 2 Puff  budesonide-formoteroL (SYMBICORT) 160-4.5 mcg/actuation HFA inhaler 2 Puff  dexamethasone (DECADRON) 4 mg/mL injection 4 mg  
  
Patient PCP: Lea Sanders MD 
PMH:  has a past medical history of Arthritis, Congestive heart failure ([facility] Dependence on wheelchair (10/5/2020), DM (diabetes mellitus), type 2 ([facility] (10/5/2020), Essential hypertension (10/5/2020), History of CVA (cerebrovascular accident) (10/5/2020), Morbid obesity ([facility] (10/5/2020), Pure hypercholesterolemia (10/5/2020), and Urinary incontinence (10/5/2020). She also has no past medical history of History of abuse in adulthood or History of abuse in childhood. PSH:   has a past surgical history that includes hx hysterectomy. FHX: Family history is unknown by patient. SHX:  reports that she has quit smoking. She has never used smokeless tobacco. She reports that she does not drink alcohol or use drugs. Systemic review: 
General states her weight has been same for the last week just noticed fever yesterday with worsening shortness of breath Eyes no double vision or momentary blindness ENT no facial pain over the sinuses or drainage Endocrinologic she has diabetes denies polyuria polydipsia Musculoskeletal some muscle aches and pains unchanged from her baseline no swollen tender joints Neurologic no seizures or syncope Gastrointestinal no nausea or vomiting states her appetite has been unchanged and no change in her sense of taste Genitourinary no pain or discomfort on urination no bleeding Vascular denies ankle edema chest pain diaphoresis or nocturia Respiratory as mentioned worsening shortness of breath minimal cough no sputum production low-grade fever Objective:  
 
Vital Signs: Telemetry:    normal sinus rhythm Intake/Output:  
Visit Vitals /85 (BP 1 Location: Right lower arm, BP Patient Position: Supine) Pulse 62 Temp 98.4 °F (36.9 °C) Resp 18 Ht 5' 4\" (1.626 m) Wt 95.3 kg (210 lb) SpO2 90% BMI 36.05 kg/m² Temp (24hrs), Av.1 °F (36.7 °C), Min:98 °F (36.7 °C), Max:98.4 °F (36.9 °C) O2 Device: Heated, Hi flow nasal cannula O2 Flow Rate (L/min): 70 l/min Wt Readings from Last 4 Encounters:  
01/28/21 95.3 kg (210 lb)  
01/22/21 127 kg (280 lb) No intake or output data in the 24 hours ending 02/02/21 1354 Last shift:      No intake/output data recorded. Last 3 shifts: No intake/output data recorded. Physical Exam:  
General:  female; mild distress with tachypnea no accessory muscle use HEENT: NCAT, oral mucosa clear Eyes: anicteric; conjunctiva clear extraocular movements intact Neck: no nodes, obesity obscures determination of JVD Chest: no deformity,  
Cardiac: Regular rate and rhythm no definite murmur Lungs: Rapid shallow respirations clear anteriorly and laterally posterior rales are present Abd: Obese soft positive bowel sounds no tenderness Ext: no edema; no joint swelling; No clubbing : clear urine Neuro: Awake alert seems anxious speech is relatively clear is hemiplegic on the left side moves the right side normally Psych- no agitation, oriented to person;  
Skin: warm, dry, no cyanosis; 
Pulses: Brachial and radial pulses are intact hard to find her femoral due to her obesity Capillary: Capillary refill Labs: 
 
Recent Labs 02/01/21 
1314 WBC 9.3 HGB 11.9  Recent Labs 02/01/21 
1314   
K 3.9  CO2 25 * BUN 55* CREA 1.56* CA 9.1 PHOS 3.1 ALB 3.2* Nasal high flow 100% FiO2 with flow rate 70 L/min with oxygen saturation 98%  CRP 1.35, 2.87, 4.87 
  280 
 ferritin 333, 235 
1/20 4of4 blood cultures with coagulase-negative staph aureus 1/26 blood culture no growth No results for input(s): CPK, CKNDX, TROIQ in the last 72 hours. No lab exists for component: CPKMB Lab Results Component Value Date/Time  Culture result: No growth 5 days 01/28/2021 06:30 AM  
 Culture result: No growth 6 days 01/26/2021 11:55 AM  
 Culture result: (A) 01/22/2021 11:45 AM  
  Staphylococcus species, coagulase negative MULTIPLE COLONY TYPES/STRAINS growing in all 4 bottles drawn Culture result:  01/22/2021 11:45 AM  
  Gram pos cocci in clusters 
growing in 2 of 4 bottles drawn Dr. Ciro Leonardo via MsDevang Javon Desais on 1.23.21 at 15:25 by [email or web address] Imaging: CXR Results  (Last 48 hours) 01/28/21 0610  XR CHEST PORT Final result Narrative:  1 view comparison 22nd Moderate patchy interstitial infiltrates, midportion of right greater than left  
lung with hypoinflation overall. No effusion or pneumothorax. Normal heart and  
Mediastinum Agree with above diffuse patchy infiltrates marked worsening from previous exam  
  
  
 
Results from [facility] Encounter encounter on 01/28/21 XR CHEST PORT Narrative 1 view comparison yesterday Little change in patchy mixed infiltrates throughout each lung, lower volumes 
overall. No effusion. Normal heart and mediastinum XR CHEST PORT Narrative 1 view comparison 22nd Moderate patchy interstitial infiltrates, midportion of right greater than left 
lung with hypoinflation overall. No effusion or pneumothorax. Normal heart and 
mediastinum Results from [facility] Encounter encounter on 01/22/21 XR CHEST SNGL V  
 Narrative 1 new comparison March 10 Impression Impression: The cardiomediastinal silhouette is appropriate for age, technique, 
and lung expansion. Pulmonary vasculature is not congested. The lungs are 
essentially clear. No effusion or pneumothorax is seen. Results from [facility] Encounter encounter on 01/28/21 CT CHEST WO CONT Narrative Noncontrast study shows pronounced patchy variable density groundglass 
opacification and lesser consolidation throughout much of each lung, relatively 
sparing the right lower lobe. Central airways are open. No mediastinal or hilar 
adenopathy. Normal caliber aorta. No pleural pericardial effusion. No 
significant upper abdominal findings  Impression Pronounced patchy diffuse bilateral pneumonitis · Discussion: Marked worsening hypoxia and pulmonary infiltrates. CRP is down which certainly goes against worsening Covid pneumonitis. Will give Lasix as mentioned above and follow oxygenation electrolytes and renal function. Agree with empiric treatment for Covid to include Decadron and remdesivir have spoken with infectious disease and will add vancomycin until repeat blood cultures are finalized · 1/29 worsening hypoxia will place on noninvasive ventilator and continue treatment for COVID-19 as well as IV Lasix 1/30 received Lasix yesterday oxygenation is improved. She has stated again that she does not want to be on life support machines or to be intubated. We will continue Lasix for diuresis. So far she is doing well today 2/2 worsening status today tachycardia that did improve with IV Lopressor. We will leave a as needed order. Remains on 100% high flow. Renal function is tolerating daily Lasix we will continue it Ramiro Gorman MD

## 2021-02-03 NOTE — PROGRESS NOTES
Infectious Disease Progress Note Subjective:  
Pt seen and examined at bedside. Remains on high flow O2, but appears more comfortable Objective:  
Physical Exam:  
 
Visit Vitals BP (!) 131/90 (BP 1 Location: Left upper arm, BP Patient Position: At rest;Supine) Pulse (!) 113 Temp 98.7 °F (37.1 °C) Resp 19 Ht 5' 4.02\" (1.626 m) Wt 276 lb 0.3 oz (125.2 kg) SpO2 94% BMI 47.35 kg/m² O2 Flow Rate (L/min): 70 l/min O2 Device: Heated, Hi flow nasal cannula Temp (24hrs), Av.6 °F (37 °C), Min:98.4 °F (36.9 °C), Max:98.8 °F (37.1 °C) No intake/output data recorded.  1901 -  0700 In: -  
Out: 8335 [Q:] General: NAD, alert, weak and lethargic HEENT: NAILA, Moist mucosa Lungs: Decreased at the bases no wheeze/rhonchi Heart: S1S2+, RRR, no murmur Abdo: Soft, NT, ND, +BS Exts: No edema, + pulses b/l  
Skin: No wounds, No rashes or lesions Data Review:  
   
Recent Days: 
Recent Labs 21 
1314 WBC 9.3 HGB 11.9 HCT 35.8  Recent Labs 21 
1250 21 
1314 BUN 52* 55* CREA 1.50* 1.56* Lab Results Component Value Date/Time C-Reactive protein 1.85 (H) 2021 05:53 AM  
  
Microbiology - Blood Cx : Neg  
 
Diagnostics CXR Results  (Last 48 hours) 21 0845  XR CHEST PORT Final result Impression:  Diffuse confluent opacities in the lungs, mildly improved. Narrative:  Chest, frontal view, 2/3/2021 History: Covid-19 pneumonia. Comparison: Including chest 2021. Findings: The cardiac silhouette is stable. The lungs are underexpanded. Diffuse confluent airspace opacities are again seen throughout the lungs with  
mild interval improvement most notably at the perihilar regions. No pleural  
effusion or pneumothorax is identified. The osseous structures are stable. Assessment/Plan 1.  Acute hypoxia due to COVID-19 pneumonitis, suspected CHF and CAP Remains on high flow at 100% FIO2, s/p 5 days of Remdesivir Improved infiltrates on CXR primarily in the left Continue aggressive diuresis and steroid therapy Monitor off antibiotics for now. Routine labs in the morning Monitor for improvement in respiratory status 2. Suspected CHF exacerbation: Remains on IV lasix, repeat CXR in AM  
  
3. H/o CVA w resulting ambulatory difficulty, wheel chair bound at home 4. Afib w RVR: Rate controlled  
  
Arianna Paniagua MD 
 
2/3/2021

## 2021-02-03 NOTE — PROGRESS NOTES
Hospitalist Progress Note Daily Progress Note: 2/3/2021 Subjective: The patient is seen for follow up. She continues on high flow nasal cannula with an FiO2 of 100%. Current oxygen saturations are only in the mid 80s. She appears to be breathing comfortably. She adamantly refuses BiPAP Problem List: 
Problem List as of 2/3/2021 Date Reviewed: 10/6/2020 Codes Class Noted - Resolved COVID-19 ICD-10-CM: U07.1 ICD-9-CM: 079.89  1/28/2021 - Present Acute respiratory failure due to COVID-19 [facility]) ICD-10-CM: U07.1, J96.00 
ICD-9-CM: 518.81, 079.89  1/22/2021 - Present Vitamin D deficiency ICD-10-CM: E55.9 ICD-9-CM: 268.9  10/6/2020 - Present History of CVA (cerebrovascular accident) ICD-10-CM: Z86.73 
ICD-9-CM: V12.54  10/5/2020 - Present Essential hypertension ICD-10-CM: I10 
ICD-9-CM: 401.9  10/5/2020 - Present Dependence on wheelchair ICD-10-CM: Z99.3 ICD-9-CM: V46.3  10/5/2020 - Present Morbid obesity ([facility] ICD-10-CM: E66.01 
ICD-9-CM: 278.01  10/5/2020 - Present Pure hypercholesterolemia ICD-10-CM: E78.00 ICD-9-CM: 272.0  10/5/2020 - Present Urinary incontinence ICD-10-CM: R32 
ICD-9-CM: 788.30  10/5/2020 - Present DM (diabetes mellitus), type 2 ([facility] ICD-10-CM: E11.9 ICD-9-CM: 250.00  10/5/2020 - Present Medications reviewed Current Facility-Administered Medications Medication Dose Route Frequency  morphine injection 2 mg  2 mg IntraVENous Q3H PRN  
 metoprolol (LOPRESSOR) injection 5 mg  5 mg IntraVENous Q6H PRN  
 insulin glargine (LANTUS) injection 13 Units  13 Units SubCUTAneous QHS  insulin lispro (HUMALOG) injection   SubCUTAneous AC&HS  
 dextrose (D50W) injection syrg 12.5-25 g  25-50 mL IntraVENous PRN  
 glimepiride (AMARYL) tablet 2 mg  2 mg Oral ACB  lidocaine (XYLOCAINE) 4 % cream   Topical BID  furosemide (LASIX) injection 60 mg  60 mg IntraVENous DAILY  heparin (porcine) injection 5,000 Units  5,000 Units SubCUTAneous Q12H  
 glucose chewable tablet 16 g  4 Tab Oral PRN  
 glucagon (GLUCAGEN) injection 1 mg  1 mg IntraMUSCular PRN  
 hydrOXYzine pamoate (VISTARIL) capsule 50 mg  50 mg Oral QID PRN  
 sodium chloride (NS) flush 5-40 mL  5-40 mL IntraVENous Q8H  
 sodium chloride (NS) flush 5-40 mL  5-40 mL IntraVENous PRN  
 acetaminophen (TYLENOL) tablet 650 mg  650 mg Oral Q6H PRN Or  
 acetaminophen (TYLENOL) suppository 650 mg  650 mg Rectal Q6H PRN  cholecalciferol (VITAMIN D3) (1000 Units /25 mcg) tablet 2,000 Units  2,000 Units Oral DAILY  albuterol (PROVENTIL HFA, VENTOLIN HFA, PROAIR HFA) inhaler 2 Puff  2 Puff Inhalation Q6H RT  
 budesonide-formoteroL (SYMBICORT) 160-4.5 mcg/actuation HFA inhaler 2 Puff  2 Puff Inhalation BID RT  
 dexamethasone (DECADRON) 4 mg/mL injection 4 mg  4 mg IntraVENous Q6H Review of Systems: A comprehensive review of systems was negative except for that written in the HPI. Objective:  
Physical Exam:  
 
Visit Vitals /80 (BP 1 Location: Right upper arm, BP Patient Position: At rest;Supine) Pulse 75 Temp 98.4 °F (36.9 °C) Resp 21 Ht 5' 4.02\" (1.626 m) Wt 125.2 kg (276 lb 0.3 oz) SpO2 94% BMI 47.35 kg/m² O2 Flow Rate (L/min): 70 l/min O2 Device: Heated, Hi flow nasal cannula Temp (24hrs), Av.2 °F (36.8 °C), Min:96.9 °F (36.1 °C), Max:98.8 °F (37.1 °C) No intake/output data recorded.  1901 -  0700 In: -  
Out: 7747 [[id number]] General:   Awake and alert. High flow nasal cannula in place, obese Lungs:    Crackles bilateral  
Chest wall:  No tenderness or deformity. Heart:  Regular rate and rhythm, S1, S2 normal, no murmur, click, rub or gallop. Abdomen:   Soft, non-tender. Bowel sounds normal. No masses,  No organomegaly. Extremities: Extremities normal, atraumatic, no cyanosis or edema. Pulses: 2+ and symmetric all extremities.   
Skin: Skin color, texture, turgor normal. No rashes or lesions Neurologic: CNII-XII intact. No gross focal deficits Data Review:  
   
Recent Days: 
Recent Labs 02/01/21 
1314 WBC 9.3 HGB 11.9 HCT 35.8  Recent Labs 02/02/21 
1250 02/01/21 
1314  141  
K 4.4 3.9  106 CO2 26 25 * 317* BUN 52* 55* CREA 1.50* 1.56* CA 9.1 9.1 PHOS 3.5 3.1 ALB 2.9* 3.2* No results for input(s): PH, PCO2, PO2, HCO3, FIO2 in the last 72 hours. 24 Hour Results: 
Recent Results (from the past 24 hour(s)) GLUCOSE, POC Collection Time: 02/02/21 12:27 PM  
Result Value Ref Range Glucose (POC) 261 (H) 65 - 100 mg/dL Performed by [employer] RENAL FUNCTION PANEL Collection Time: 02/02/21 12:50 PM  
Result Value Ref Range Sodium 142 136 - 145 mmol/L Potassium 4.4 3.5 - 5.1 mmol/L Chloride 108 97 - 108 mmol/L  
 CO2 26 21 - 32 mmol/L Anion gap 8 5 - 15 mmol/L Glucose 265 (H) 65 - 100 mg/dL BUN 52 (H) 6 - 20 mg/dL Creatinine 1.50 (H) 0.55 - 1.02 mg/dL BUN/Creatinine ratio 35 (H) 12 - 20 GFR est AA 40 (L) >60 ml/min/1.73m2 GFR est non-AA 33 (L) >60 ml/min/1.73m2 Calcium 9.1 8.5 - 10.1 mg/dL Phosphorus 3.5 2.6 - 4.7 mg/dL Albumin 2.9 (L) 3.5 - 5.0 g/dL BNP Collection Time: 02/02/21 12:50 PM  
Result Value Ref Range NT pro-BNP 2,346 (H) <450 pg/mL GLUCOSE, POC Collection Time: 02/02/21  3:39 PM  
Result Value Ref Range Glucose (POC) 249 (H) 65 - 100 mg/dL Performed by [employer] GLUCOSE, POC Collection Time: 02/02/21  7:39 PM  
Result Value Ref Range Glucose (POC) 232 (H) 65 - 100 mg/dL Performed by [facility]   
 
 
XR CHEST PORT Final Result CT CHEST WO CONT Final Result Pronounced patchy diffuse bilateral pneumonitis XR CHEST PORT Final Result XR CHEST PORT    (Results Pending) Assessment: 
COVID-19 with pneumonitis, completed remdesivir, on Decadron Acute respiratory failure with hypoxia, continues on high flow oxygen. She declined BiPAP Diabetes mellitus type 2: uncontrolled, may due to steroid use 
  
COPD without exacerbation 
  
Chronic kidney disease stage III good urine output 
  
Chronic heart failure, details unknown  
  
History of CVA, wheelchair-bound 
  
Hypertension fair controlled Plan: 
Continue Decadron, supplemental oxygen, supportive care Overall prognosis  poor CODE STATUS is DNR Total time spent with patient: 30 minutes.  
 
Diana Mcmullen MD

## 2021-02-03 NOTE — PROGRESS NOTES
Pulmonary, Critical Care Note Name: Dionte Gordon MRN: [id number] : 1940 Hospital: [address] [place] [address] Date: 2/3/2021  Admission date: 2021 Hospital Day: 7 Subjective/Interval History:  
Seen in the emergency room. Patient was just in the hospital last week with COVID-19 positive hypoxia pneumonitis. She was treated with Decadron Actemra and 1 dose ivermectin. She was weaned to room air with decreasing CRP at the time of discharge. She did well for the first 2 or 3 days but about 2 days ago started noticing worsening shortness of breath again. She had low-grade fever last night 99 4 came to the emergency room was found to have worsening hypoxia and is now on nonrebreather. Chest x-ray shows marked worsening diffuse infiltrates she states she has had some cough mostly nonproductive had nausea vomiting diarrhea states her appetite is unchanged and still has a sense of taste of interest her CRP at the time of discharge 0.87 and today 1.35 her blood cultures from  showed 4 of 4 with coagulase-negative staph aureus. She was an extremely difficult stick and it was felt that this was probably 1 stick in for blood culture bottles and represented skin contaminant. Blood culture from  at the time of discharge was no growth  progressively worsening hypoxia had to place on 100% nasal high flow. She now is running oxygen saturation of 88%. High flow rate increased to 70 L and saturation only 90%  remains on nasal high flow at 100% operations nonlabored sitting at rest she has stated she does not want to be intubated or to use BiPAP 
2/3 no episodes of distress overnight or this morning oxygen saturation 88 to 90% on 100% high flow labs yesterday showed stable creatinine currently receiving Lasix daily Hospital Problems  Date Reviewed: 10/6/2020 Codes Class Noted POA COVID-19 ICD-10-CM: U07.1 ICD-9-CM: 079.89  2021 Unknown IMPRESSION:  
1. Acute Hypoxic respiratory failure remains on high flow nasal oxygen 100% currently with O2 sat 90 
2. Diffuse pulmonary infiltrates pulmonary edema versus community-acquired pneumonia/COVID-19 pneumonitis 2/3 chest x-ray shows improvement primarily on the right with the left side still diffuse infiltrate 3. Recent COVID-19 infection 4. History CHF 5. History diabetes 6. Chronic kidney disease Body mass index is 47.35 kg/m². RECOMMENDATIONS/PLAN:  
1. Continue Decadron has completed Remdesivir and received ivermectin and Actemra 2. Continue Lasix 3. Continue on inhaled albuterol and Symbicort [x] High complexity decision making was performed [x] See my orders for details Subjective/Initial History:  
 
I was asked by Tommy Davis MD to see Steph Murphy  a 78 y.o.  female in consultation for a chief complaint of hypoxic respiratory failure Covid pneumonitis versus pulmonary edema Allergies Allergen Reactions  Sulfa (Sulfonamide Antibiotics) Angioedema  Contrast Dye [Iodine] Itching MAR reviewed and pertinent medications noted or modified as needed Current Facility-Administered Medications Medication  morphine injection 2 mg  metoprolol (LOPRESSOR) injection 5 mg  insulin glargine (LANTUS) injection 13 Units  insulin lispro (HUMALOG) injection  dextrose (D50W) injection syrg 12.5-25 g  
 glimepiride (AMARYL) tablet 2 mg  lidocaine (XYLOCAINE) 4 % cream  
 furosemide (LASIX) injection 60 mg  
 heparin (porcine) injection 5,000 Units  glucose chewable tablet 16 g  
 glucagon (GLUCAGEN) injection 1 mg  hydrOXYzine pamoate (VISTARIL) capsule 50 mg  
 sodium chloride (NS) flush 5-40 mL  sodium chloride (NS) flush 5-40 mL  acetaminophen (TYLENOL) tablet 650 mg Or  acetaminophen (TYLENOL) suppository 650 mg  cholecalciferol (VITAMIN D3) (1000 Units /25 mcg) tablet 2,000 Units  albuterol (PROVENTIL HFA, VENTOLIN HFA, PROAIR HFA) inhaler 2 Puff  budesonide-formoteroL (SYMBICORT) 160-4.5 mcg/actuation HFA inhaler 2 Puff  dexamethasone (DECADRON) 4 mg/mL injection 4 mg Patient PCP: Geovanny Kumari MD 
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 She also has no past medical history of History of abuse in adulthood or History of abuse in childhood. PSH:   has a past surgical history that includes hx hysterectomy. FHX: Family history is unknown by patient. SHX:  reports that she has quit smoking. She has never used smokeless tobacco. She reports that she does not drink alcohol or use drugs. Systemic review: 
General states her weight has been same for the last week just noticed fever yesterday with worsening shortness of breath Eyes no double vision or momentary blindness ENT no facial pain over the sinuses or drainage Endocrinologic she has diabetes denies polyuria polydipsia Musculoskeletal some muscle aches and pains unchanged from her baseline no swollen tender joints Neurologic no seizures or syncope Gastrointestinal no nausea or vomiting states her appetite has been unchanged and no change in her sense of taste Genitourinary no pain or discomfort on urination no bleeding Vascular denies ankle edema chest pain diaphoresis or nocturia Respiratory as mentioned worsening shortness of breath minimal cough no sputum production low-grade fever Objective:  
 
Vital Signs: Telemetry:    normal sinus rhythm Intake/Output:  
Visit Vitals /80 (BP 1 Location: Right upper arm, BP Patient Position: At rest;Supine) Pulse 75 Temp 98.4 °F (36.9 °C)  
Resp 21  
Ht 5' 4.02\" (1.626 m)  
Wt 125.2 kg (276 lb 0.3 oz)  
SpO2 94%  
BMI 47.35 kg/m²  
 
 
Temp (24hrs), Av.2 °F (36.8 °C), Min:96.9 °F (36.1 °C), Max:98.8 °F (37.1 °C) 
   
 
O2 Device: Heated, Hi flow nasal cannula O2 Flow Rate (L/min): 70 l/min 
 
  
Wt Readings from Last 4 Encounters:  
21 125.2 kg (276 lb 0.3 oz)  
21 127 kg (280 lb)  
 
 
  
Intake/Output Summary (Last 24 hours) at 2/3/2021 1212 
Last data filed at 2/3/2021 0514 
Gross per 24 hour  
Intake —  
Output 1800 ml  
Net -1800 ml  
 
 
Last shift:      No intake/output data recorded. 
Last 3 shifts:  1901 -  0700 
In: -  
Out: 1800 [Urine:1800]  
 
 
Physical Exam:  
General:  female; mild distress with tachypnea no accessory muscle use 
HEENT: NCAT, oral mucosa clear 
Eyes: anicteric; conjunctiva clear extraocular movements intact 
Neck: no nodes, obesity obscures determination of JVD 
Chest: no deformity,  
Cardiac: Regular rate and rhythm no definite murmur 
Lungs: Rapid shallow respirations clear anteriorly and laterally posterior rales are present 
Abd: Obese soft positive bowel sounds no tenderness 
Ext: no edema; no joint swelling; No clubbing 
: clear urine 
Neuro: Awake alert seems anxious speech is relatively clear is hemiplegic on the left side moves the right side normally 
Psych- no agitation, oriented to person;  
Skin: warm, dry, no cyanosis; 
Pulses: Brachial and radial pulses are intact hard to find her femoral due to her obesity 
Capillary: Capillary refill 
 
Labs: 
 
Recent Labs  
  21 
1314  
WBC 9.3  
HGB 11.9  
  
 
Recent Labs  
  21 
1250 21 
1314  
 141  
K 4.4 3.9  
 106  
CO2 26 25  
* 317*  
BUN 52* 55*  
CREA 1.50* 1.56*  
CA 9.1 9.1  
PHOS 3.5 3.1  
ALB 2.9* 3.2*  
 
Nasal high flow 100% FiO2 with flow rate 70 L/min with oxygen saturation 98% 
 
CRP 1.35, 2.87, 4.87 
  280 
 ferritin 333, 235 
 4of4  blood cultures with coagulase-negative staph aureus 1/26 blood culture no growth Lab Results Component Value Date/Time Culture result: No growth 6 days 01/28/2021 06:30 AM  
 Culture result: No growth 6 days 01/26/2021 11:55 AM  
 Culture result: (A) 01/22/2021 11:45 AM  
  Staphylococcus species, coagulase negative MULTIPLE COLONY TYPES/STRAINS growing in all 4 bottles drawn Culture result:  01/22/2021 11:45 AM  
  Gram pos cocci in clusters 
growing in 2 of 4 bottles drawn Dr. Vanessa Ramirez via Ms. Medina Mention on 1.23.21 at 15:25 by tlw Imaging: CXR Results  (Last 48 hours) 01/28/21 0610  XR CHEST PORT Final result Narrative:  1 view comparison 22nd Moderate patchy interstitial infiltrates, midportion of right greater than left  
lung with hypoinflation overall. No effusion or pneumothorax. Normal heart and  
Mediastinum Agree with above diffuse patchy infiltrates marked worsening from previous exam  
  
  
 
Results from [facility] [place] on 01/28/21 XR CHEST PORT Narrative Chest, frontal view, 2/3/2021 History: Covid-19 pneumonia. Comparison: Including chest 1/29/2021. Findings: The cardiac silhouette is stable. The lungs are underexpanded. Diffuse confluent airspace opacities are again seen throughout the lungs with 
mild interval improvement most notably at the perihilar regions. No pleural 
effusion or pneumothorax is identified. The osseous structures are stable. Impression Diffuse confluent opacities in the lungs, mildly improved. XR CHEST PORT Narrative 1 view comparison yesterday Little change in patchy mixed infiltrates throughout each lung, lower volumes 
overall. No effusion. Normal heart and mediastinum XR CHEST PORT Narrative 1 view comparison 22nd Moderate patchy interstitial infiltrates, midportion of right greater than left 
lung with hypoinflation overall. No effusion or pneumothorax.  Normal heart and mediastinum Results from [facility] [place] Encounter encounter on 01/28/21 CT CHEST WO CONT Narrative Noncontrast study shows pronounced patchy variable density groundglass 
opacification and lesser consolidation throughout much of each lung, relatively 
sparing the right lower lobe. Central airways are open. No mediastinal or hilar 
adenopathy. Normal caliber aorta. No pleural pericardial effusion. No 
significant upper abdominal findings Impression Pronounced patchy diffuse bilateral pneumonitis · Discussion: Marked worsening hypoxia and pulmonary infiltrates. CRP is down which certainly goes against worsening Covid pneumonitis. Will give Lasix as mentioned above and follow oxygenation electrolytes and renal function. Agree with empiric treatment for Covid to include Decadron and remdesivir have spoken with infectious disease and will add vancomycin until repeat blood cultures are finalized · 1/29 worsening hypoxia will place on noninvasive ventilator and continue treatment for COVID-19 as well as IV Lasix 1/30 received Lasix yesterday oxygenation is improved. She has stated again that she does not want to be on life support machines or to be intubated. We will continue Lasix for diuresis. So far she is doing well today 2/2 worsening status today tachycardia that did improve with IV Lopressor. We will leave an as needed order. Remains on 100% high flow. Renal function is tolerating daily Lasix we will continue it Ada Mariscal MD

## 2021-02-04 NOTE — PROGRESS NOTES
Pulmonary, Critical Care Note Name: Margy Jacinto MRN: [id number] : 1940 Hospital: [address] [place] [address] Date: 2021  Admission date: 2021 Hospital Day: 8 Subjective/Interval History:  
Seen in the emergency room. Patient was just in the hospital last week with COVID-19 positive hypoxia pneumonitis. She was treated with Decadron Actemra and 1 dose ivermectin. She was weaned to room air with decreasing CRP at the time of discharge. She did well for the first 2 or 3 days but about 2 days ago started noticing worsening shortness of breath again. She had low-grade fever last night 99 4 came to the emergency room was found to have worsening hypoxia and is now on nonrebreather. Chest x-ray shows marked worsening diffuse infiltrates she states she has had some cough mostly nonproductive had nausea vomiting diarrhea states her appetite is unchanged and still has a sense of taste of interest her CRP at the time of discharge 0.87 and today 1.35 her blood cultures from  showed 4 of 4 with coagulase-negative staph aureus. She was an extremely difficult stick and it was felt that this was probably 1 stick in for blood culture bottles and represented skin contaminant. Blood culture from  at the time of discharge was no growth  progressively worsening hypoxia had to place on 100% nasal high flow. She now is running oxygen saturation of 88%. High flow rate increased to 70 L and saturation only 90%  remains on nasal high flow at 100% operations nonlabored sitting at rest she has stated she does not want to be intubated or to use BiPAP 
2/4 states she feels okay today when I am in the room oxygen saturation 95% on 100% nasal high flow with nonlabored respirations Hospital Problems  Date Reviewed: 10/6/2020 Codes Class Noted POA COVID-19 ICD-10-CM: U07.1 ICD-9-CM: 079.89  2021 Unknown IMPRESSION:  
1.  Acute Hypoxic respiratory failure remains on high flow nasal oxygen 100% currently with O2 sat 95 
2. Diffuse pulmonary infiltrates pulmonary edema versus community-acquired pneumonia/COVID-19 pneumonitis 2/3 chest x-ray shows improvement primarily on the right with the left side still diffuse infiltrate 3. Recent COVID-19 infection 4. History CHF 5. History diabetes 6. Chronic kidney disease Body mass index is 47.35 kg/m². RECOMMENDATIONS/PLAN:  
1. has completed Remdesivir and received ivermectin and Actemra 2. We will change Decadron to oral and decrease to every 12 3. Continue Lasix 4. Continue on inhaled albuterol and Symbicort [x] High complexity decision making was performed [x] See my orders for details Subjective/Initial History:  
 
I was asked by Yann Elmore MD to see Malik Torres  a 78 y.o.  female in consultation for a chief complaint of hypoxic respiratory failure Covid pneumonitis versus pulmonary edema Allergies Allergen Reactions  Sulfa (Sulfonamide Antibiotics) Angioedema  Contrast Dye [Iodine] Itching MAR reviewed and pertinent medications noted or modified as needed Current Facility-Administered Medications Medication  metoprolol (LOPRESSOR) injection 5 mg  insulin glargine (LANTUS) injection 13 Units  insulin lispro (HUMALOG) injection  dextrose (D50W) injection syrg 12.5-25 g  
 glimepiride (AMARYL) tablet 2 mg  lidocaine (XYLOCAINE) 4 % cream  
 furosemide (LASIX) injection 60 mg  
 heparin (porcine) injection 5,000 Units  glucose chewable tablet 16 g  
 glucagon (GLUCAGEN) injection 1 mg  hydrOXYzine pamoate (VISTARIL) capsule 50 mg  
 sodium chloride (NS) flush 5-40 mL  sodium chloride (NS) flush 5-40 mL  acetaminophen (TYLENOL) tablet 650 mg Or  acetaminophen (TYLENOL) suppository 650 mg  cholecalciferol (VITAMIN D3) (1000 Units /25 mcg) tablet 2,000 Units  albuterol (PROVENTIL HFA, VENTOLIN HFA, PROAIR HFA) inhaler 2 Puff  budesonide-formoteroL (SYMBICORT) 160-4.5 mcg/actuation HFA inhaler 2 Puff  dexamethasone (DECADRON) 4 mg/mL injection 4 mg Patient PCP: Ami Dunaway MD 
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 She also has no past medical history of History of abuse in adulthood or History of abuse in childhood. PSH:   has a past surgical history that includes hx hysterectomy. FHX: Family history is unknown by patient. SHX:  reports that she has quit smoking. She has never used smokeless tobacco. She reports that she does not drink alcohol or use drugs. Systemic review: 
General states her weight has been same for the last week just noticed fever yesterday with worsening shortness of breath Eyes no double vision or momentary blindness ENT no facial pain over the sinuses or drainage Endocrinologic she has diabetes denies polyuria polydipsia Musculoskeletal some muscle aches and pains unchanged from her baseline no swollen tender joints Neurologic no seizures or syncope Gastrointestinal no nausea or vomiting states her appetite has been unchanged and no change in her sense of taste Genitourinary no pain or discomfort on urination no bleeding Vascular denies ankle edema chest pain diaphoresis or nocturia Respiratory as mentioned worsening shortness of breath minimal cough no sputum production low-grade fever Objective:  
 
Vital Signs: Telemetry:    normal sinus rhythm Intake/Output:  
Visit Vitals BP (!) 163/90 (BP 1 Location: Left lower arm, BP Patient Position: At rest;Supine) Pulse 94 Temp 99.1 °F (37.3 °C) Resp 19 Ht 5' 4.02\" (1.626 m) Wt 125.2 kg (276 lb 0.3 oz) SpO2 93% BMI 47.35 kg/m² Temp (24hrs), Av.8 °F (37.1 °C), Min:98.4 °F (36.9 °C), Max:99.1 °F (37.3 °C) O2 Device: Hi flow nasal cannula, Heated O2 Flow Rate (L/min): 70 l/min Wt Readings from Last 4 Encounters:  
21 125.2 kg (276 lb 0.3 oz) 21 127 kg (280 lb) No intake or output data in the 24 hours ending 21 1453 Last shift:      No intake/output data recorded. Last 3 shifts:  1901 -  0700 In: -  
Out: 196 [EZGKO:257] Physical Exam:  
General:  female; mild distress with tachypnea no accessory muscle use HEENT: NCAT, oral mucosa clear Eyes: anicteric; conjunctiva clear extraocular movements intact Neck: no nodes, obesity obscures determination of JVD Chest: no deformity,  
Cardiac: Regular rate and rhythm no definite murmur Lungs: Rapid shallow respirations clear anteriorly and laterally posterior rales are present Abd: Obese soft positive bowel sounds no tenderness Ext: no edema; no joint swelling; No clubbing : clear urine Neuro: Awake alert seems anxious speech is relatively clear is hemiplegic on the left side moves the right side normally Psych- no agitation, oriented to person;  
Skin: warm, dry, no cyanosis; 
Pulses: Brachial and radial pulses are intact hard to find her femoral due to her obesity Capillary: Capillary refill Labs: 
 
 
Recent Labs 21 
1250   
K 4.4  
 CO2 26 * BUN 52* CREA 1.50* CA 9.1 PHOS 3.5 ALB 2.9* Nasal high flow 100% FiO2 with flow rate 70 L/min with oxygen saturation 98%  CRP 1.35, 2.87, 4.87 
  280 
 ferritin 333, 235 
 4of4 blood cultures with coagulase-negative staph aureus  blood culture no growth Lab Results Component Value Date/Time  Culture result: No growth 6 days 2021 06:30 AM  
 Culture result: No growth 6 days 2021 11:55 AM  
 Culture result: (A) 01/22/2021 11:45 AM  
  Staphylococcus species, coagulase negative MULTIPLE COLONY TYPES/STRAINS growing in all 4 bottles drawn Culture result:  01/22/2021 11:45 AM  
  Gram pos cocci in clusters 
growing in 2 of 4 bottles drawn Dr. Connor Bergman via Ms. Ilya Pittman on 1.23.21 at 15:25 by tlw Imaging: CXR Results  (Last 48 hours) 01/28/21 0610  XR CHEST PORT Final result Narrative:  1 view comparison 22nd Moderate patchy interstitial infiltrates, midportion of right greater than left  
lung with hypoinflation overall. No effusion or pneumothorax. Normal heart and  
Mediastinum Agree with above diffuse patchy infiltrates marked worsening from previous exam  
  
  
 
Results from [facility] [place] Encounter encounter on 01/28/21 XR CHEST PORT Narrative Chest, frontal view, 2/3/2021 History: Covid-19 pneumonia. Comparison: Including chest 1/29/2021. Findings: The cardiac silhouette is stable. The lungs are underexpanded. Diffuse confluent airspace opacities are again seen throughout the lungs with 
mild interval improvement most notably at the perihilar regions. No pleural 
effusion or pneumothorax is identified. The osseous structures are stable. Impression Diffuse confluent opacities in the lungs, mildly improved. XR CHEST PORT Narrative 1 view comparison yesterday Little change in patchy mixed infiltrates throughout each lung, lower volumes 
overall. No effusion. Normal heart and mediastinum XR CHEST PORT Narrative 1 view comparison 22nd Moderate patchy interstitial infiltrates, midportion of right greater than left 
lung with hypoinflation overall. No effusion or pneumothorax. Normal heart and 
mediastinum Results from [facility] [place] Encounter encounter on 01/28/21 CT CHEST WO CONT  Narrative Noncontrast study shows pronounced patchy variable density groundglass 
opacification and lesser consolidation throughout much of each lung, relatively sparing the right lower lobe. Central airways are open. No mediastinal or hilar 
adenopathy. Normal caliber aorta. No pleural pericardial effusion. No 
significant upper abdominal findings Impression Pronounced patchy diffuse bilateral pneumonitis · Discussion: Marked worsening hypoxia and pulmonary infiltrates. CRP is down which certainly goes against worsening Covid pneumonitis. Will give Lasix as mentioned above and follow oxygenation electrolytes and renal function. Agree with empiric treatment for Covid to include Decadron and remdesivir have spoken with infectious disease and will add vancomycin until repeat blood cultures are finalized · 1/29 worsening hypoxia will place on noninvasive ventilator and continue treatment for COVID-19 as well as IV Lasix 1/30 received Lasix yesterday oxygenation is improved. She has stated again that she does not want to be on life support machines or to be intubated. We will continue Lasix for diuresis. So far she is doing well today 2/2 worsening status today tachycardia that did improve with IV Lopressor. We will leave an as needed order. Remains on 100% high flow. Renal function is tolerating daily Lasix we will continue it 2/4 oxygenation improved while I am in the room with nonlabored respirations will decrease Decadron to every 12 hours dosing orally Grayson Hicks MD

## 2021-02-04 NOTE — PROGRESS NOTES
Infectious Disease Progress Note Subjective:  
Slowly improving, denies new complaints. Remains on High O2 at 100%. Denies productive cough Objective:  
Physical Exam:  
 
Visit Vitals BP (!) 141/79 (BP Patient Position: At rest) Pulse 82 Temp 98.5 °F (36.9 °C) Resp 20 Ht 5' 4.02\" (1.626 m) Wt 276 lb 0.3 oz (125.2 kg) SpO2 90% BMI 47.35 kg/m² O2 Flow Rate (L/min): 70 l/min O2 Device: Hi flow nasal cannula, Heated Temp (24hrs), Av.7 °F (37.1 °C), Min:98.4 °F (36.9 °C), Max:99.1 °F (37.3 °C) No intake/output data recorded.  1901 -  0700 In: -  
Out: 503 [DEOPP:736] General: NAD, alert, AAO x 4 HEENT: NAILA, Moist mucosa Lungs: Decreased at the bases no wheeze/rhonchi Heart: S1S2+, RRR, no murmur Abdo: Soft, NT, ND, +BS Exts: No edema, + pulses b/l  
Skin: No wounds, No rashes or lesions Data Review:  
   
Recent Days: 
No results for input(s): WBC, HGB, HCT, PLT, HGBEXT, HCTEXT, PLTEXT, HGBEXT, HCTEXT, PLTEXT in the last 72 hours. Recent Labs 21 
1250 BUN 52* CREA 1.50* Lab Results Component Value Date/Time C-Reactive protein 1.85 (H) 2021 05:53 AM  
  
Microbiology - Blood Cx : Neg  
 
Diagnostics CXR Results  (Last 48 hours) 21 0845  XR CHEST PORT Final result Impression:  Diffuse confluent opacities in the lungs, mildly improved. Narrative:  Chest, frontal view, 2/3/2021 History: Covid-19 pneumonia. Comparison: Including chest 2021. Findings: The cardiac silhouette is stable. The lungs are underexpanded. Diffuse confluent airspace opacities are again seen throughout the lungs with  
mild interval improvement most notably at the perihilar regions. No pleural  
effusion or pneumothorax is identified. The osseous structures are stable. Assessment/Plan 1.  Acute hypoxia due to COVID-19 pneumonitis, suspected CHF and CAP  
    S/p 5 days of Remdesivir, remains on high flow O2 Afebrile on steroid taper, Routine labs not done today Continue to monitor off antibiotics for now Monitor for improvement in clinical status 2. Suspected CHF exacerbation: Continue diuresis w lasix  
  
3. H/o CVA w b/l LE weakness, wheel chair bound 4. Afib w RVR: Rate controlled  
  
Keon Landaverde MD 
 
2/4/2021

## 2021-02-04 NOTE — PROGRESS NOTES
Bedside and Verbal shift change report given to Silverio Wolff (oncoming nurse) by Elia Phoenix (offgoing nurse). Report included the following information SBAR, Intake/Output, MAR and Recent Results.

## 2021-02-04 NOTE — PROGRESS NOTES
CM reviewed chart. Patients discharge plan remains: Home with [address]. Clinical updates have been uploaded via [facility] for [address] review.

## 2021-02-04 NOTE — PROGRESS NOTES
Hospitalist Progress Note Daily Progress Note: 2/4/2021 Subjective: The patient is seen for follow up. She continues on high flow nasal cannula at 100% FiO2. Saturations are running from 86% to 92%. She is looking more fatigued this morning. Complains of a headache Problem List: 
Problem List as of 2/4/2021 Date Reviewed: 10/6/2020 Codes Class Noted - Resolved COVID-19 ICD-10-CM: U07.1 ICD-9-CM: 079.89  1/28/2021 - Present Acute respiratory failure due to COVID-19 [facility]) ICD-10-CM: U07.1, J96.00 
ICD-9-CM: 518.81, 079.89  1/22/2021 - Present Vitamin D deficiency ICD-10-CM: E55.9 ICD-9-CM: 268.9  10/6/2020 - Present History of CVA (cerebrovascular accident) ICD-10-CM: Z86.73 
ICD-9-CM: V12.54  10/5/2020 - Present Essential hypertension ICD-10-CM: I10 
ICD-9-CM: 401.9  10/5/2020 - Present Dependence on wheelchair ICD-10-CM: Z99.3 ICD-9-CM: V46.3  10/5/2020 - Present [facility] ICD-10-CM: E66.01 
ICD-9-CM: 278.01  10/5/2020 - Present Pure hypercholesterolemia ICD-10-CM: E78.00 ICD-9-CM: 272.0  10/5/2020 - Present Urinary incontinence ICD-10-CM: R32 
ICD-9-CM: 788.30  10/5/2020 - Present DM (diabetes mellitus), type 2 ([facility] ICD-10-CM: E11.9 ICD-9-CM: 250.00  10/5/2020 - Present Medications reviewed Current Facility-Administered Medications Medication Dose Route Frequency  morphine injection 2 mg  2 mg IntraVENous Q3H PRN  
 metoprolol (LOPRESSOR) injection 5 mg  5 mg IntraVENous Q6H PRN  
 insulin glargine (LANTUS) injection 13 Units  13 Units SubCUTAneous QHS  insulin lispro (HUMALOG) injection   SubCUTAneous AC&HS  
 dextrose (D50W) injection syrg 12.5-25 g  25-50 mL IntraVENous PRN  
 glimepiride (AMARYL) tablet 2 mg  2 mg Oral ACB  lidocaine (XYLOCAINE) 4 % cream   Topical BID  furosemide (LASIX) injection 60 mg  60 mg IntraVENous DAILY  heparin (porcine) injection 5,000 Units  5,000 Units SubCUTAneous Q12H  
• glucose chewable tablet 16 g  4 Tab Oral PRN  
• glucagon (GLUCAGEN) injection 1 mg  1 mg IntraMUSCular PRN  
• hydrOXYzine pamoate (VISTARIL) capsule 50 mg  50 mg Oral QID PRN  
• sodium chloride (NS) flush 5-40 mL  5-40 mL IntraVENous Q8H  
• sodium chloride (NS) flush 5-40 mL  5-40 mL IntraVENous PRN  
• acetaminophen (TYLENOL) tablet 650 mg  650 mg Oral Q6H PRN  
 Or  
• acetaminophen (TYLENOL) suppository 650 mg  650 mg Rectal Q6H PRN  
• cholecalciferol (VITAMIN D3) (1000 Units /25 mcg) tablet 2,000 Units  2,000 Units Oral DAILY  
• albuterol (PROVENTIL HFA, VENTOLIN HFA, PROAIR HFA) inhaler 2 Puff  2 Puff Inhalation Q6H RT  
• budesonide-formoteroL (SYMBICORT) 160-4.5 mcg/actuation HFA inhaler 2 Puff  2 Puff Inhalation BID RT  
• dexamethasone (DECADRON) 4 mg/mL injection 4 mg  4 mg IntraVENous Q6H  
 
 
Review of Systems:  
A comprehensive review of systems was negative except for that written in the HPI. 
 
Objective:  
Physical Exam:  
 
Visit Vitals 
BP (!) 163/90 (BP 1 Location: Left lower arm, BP Patient Position: At rest;Supine)  
Pulse 94  
Temp 99.1 °F (37.3 °C)  
Resp 19  
Ht 5' 4.02\" (1.626 m)  
Wt 125.2 kg (276 lb 0.3 oz)  
SpO2 94%  
BMI 47.35 kg/m²  
 O2 Flow Rate (L/min): 100 l/min O2 Device: Hi flow nasal cannula 
 
Temp (24hrs), Av.7 °F (37.1 °C), Min:98.4 °F (36.9 °C), Max:99.1 °F (37.3 °C) 
  No intake/output data recorded.    1901 -  0700 
In: -  
Out: 300 [Urine:300] 
 
General:   Awake and alert.  High flow nasal cannula in place, obese  
Lungs:    Crackles bilateral  
Chest wall:  No tenderness or deformity.  
Heart:  Regular rate and rhythm, S1, S2 normal, no murmur, click, rub or gallop.  
Abdomen:   Soft, non-tender. Bowel sounds normal. No masses,  No organomegaly.  
Extremities: Extremities normal, atraumatic, no cyanosis or edema.  
Pulses: 2+ and symmetric all extremities.  
Skin: Skin color, texture, turgor  normal. No rashes or lesions Neurologic: CNII-XII intact. No gross focal deficits Data Review:  
   
Recent Days: 
Recent Labs 02/01/21 
1314 WBC 9.3 HGB 11.9 HCT 35.8  Recent Labs 02/02/21 
1250 02/01/21 
1314  141  
K 4.4 3.9  106 CO2 26 25 * 317* BUN 52* 55* CREA 1.50* 1.56* CA 9.1 9.1 PHOS 3.5 3.1 ALB 2.9* 3.2* No results for input(s): PH, PCO2, PO2, HCO3, FIO2 in the last 72 hours. 24 Hour Results: No results found for this or any previous visit (from the past 24 hour(s)). XR CHEST PORT Final Result Diffuse confluent opacities in the lungs, mildly improved. XR CHEST PORT Final Result CT CHEST WO CONT Final Result Pronounced patchy diffuse bilateral pneumonitis XR CHEST PORT Final Result Assessment: 
COVID-19 with pneumonitis, completed remdesivir, on Decadron Acute respiratory failure with hypoxia, continues on high flow oxygen. She declined BiPAP Diabetes mellitus type 2: uncontrolled,  due to steroid use 
  
COPD without exacerbation 
  
Chronic kidney disease stage III good urine output 
  
Chronic heart failure, details unknown  
  
History of CVA, wheelchair-bound 
  
Hypertension fair controlled Plan: 
Continue Decadron, supplemental oxygen, supportive care Recheck labs in a.m. Continue daily IV furosemide Recheck chest x-ray in a.m. CODE STATUS is DNR Total time spent with patient: 30 minutes.  
 
Juancho Urbina MD

## 2021-02-05 NOTE — PROGRESS NOTES
Pt. Having some O2 desaturations with 100% HFNC. Refusing bipap at this time. Sats are 90% now and will continue to monitor.

## 2021-02-05 NOTE — PROGRESS NOTES
Problem: Diabetes Self-Management Goal: *Disease process and treatment process Description: Define diabetes and identify own type of diabetes; list 3 options for treating diabetes. Outcome: Progressing Towards Goal 
Goal: *Incorporating nutritional management into lifestyle Description: Describe effect of type, amount and timing of food on blood glucose; list 3 methods for planning meals. Outcome: Progressing Towards Goal 
Goal: *Incorporating physical activity into lifestyle Description: State effect of exercise on blood glucose levels.  
Outcome: Progressing Towards Goal

## 2021-02-05 NOTE — PROGRESS NOTES
Hospitalist Progress Note Daily Progress Note: 2/5/2021 Subjective: The patient is seen for follow up. She continues on high flow nasal cannula at 100% FiO2. Saturations are running from   92%. Complains of a dry mouth. Labs are pending Problem List: 
Problem List as of 2/5/2021 Date Reviewed: 10/6/2020 Codes Class Noted - Resolved COVID-19 ICD-10-CM: U07.1 ICD-9-CM: 079.89  1/28/2021 - Present Acute respiratory failure due to COVID-19 [facility]) ICD-10-CM: U07.1, J96.00 
ICD-9-CM: 518.81, 079.89  1/22/2021 - Present Vitamin D deficiency ICD-10-CM: E55.9 ICD-9-CM: 268.9  10/6/2020 - Present History of CVA (cerebrovascular accident) ICD-10-CM: Z86.73 
ICD-9-CM: V12.54  10/5/2020 - Present Essential hypertension ICD-10-CM: I10 
ICD-9-CM: 401.9  10/5/2020 - Present Dependence on wheelchair ICD-10-CM: Z99.3 ICD-9-CM: V46.3  10/5/2020 - Present [facility] ICD-10-CM: E66.01 
ICD-9-CM: 278.01  10/5/2020 - Present Pure hypercholesterolemia ICD-10-CM: E78.00 ICD-9-CM: 272.0  10/5/2020 - Present Urinary incontinence ICD-10-CM: R32 
ICD-9-CM: 788.30  10/5/2020 - Present DM (diabetes mellitus), type 2 ([facility] ICD-10-CM: E11.9 ICD-9-CM: 250.00  10/5/2020 - Present Medications reviewed Current Facility-Administered Medications Medication Dose Route Frequency  dexAMETHasone (DECADRON) tablet 4 mg  4 mg Oral Q12H  
 metoprolol (LOPRESSOR) injection 5 mg  5 mg IntraVENous Q6H PRN  
 insulin glargine (LANTUS) injection 13 Units  13 Units SubCUTAneous QHS  insulin lispro (HUMALOG) injection   SubCUTAneous AC&HS  
 dextrose (D50W) injection syrg 12.5-25 g  25-50 mL IntraVENous PRN  
 glimepiride (AMARYL) tablet 2 mg  2 mg Oral ACB  lidocaine (XYLOCAINE) 4 % cream   Topical BID  furosemide (LASIX) injection 60 mg  60 mg IntraVENous DAILY  heparin (porcine) injection 5,000 Units 5,000 Units SubCUTAneous Q12H  
 glucose chewable tablet 16 g  4 Tab Oral PRN  
 glucagon (GLUCAGEN) injection 1 mg  1 mg IntraMUSCular PRN  
 hydrOXYzine pamoate (VISTARIL) capsule 50 mg  50 mg Oral QID PRN  
 sodium chloride (NS) flush 5-40 mL  5-40 mL IntraVENous Q8H  
 sodium chloride (NS) flush 5-40 mL  5-40 mL IntraVENous PRN  
 acetaminophen (TYLENOL) tablet 650 mg  650 mg Oral Q6H PRN Or  
 acetaminophen (TYLENOL) suppository 650 mg  650 mg Rectal Q6H PRN  cholecalciferol (VITAMIN D3) (1000 Units /25 mcg) tablet 2,000 Units  2,000 Units Oral DAILY  albuterol (PROVENTIL HFA, VENTOLIN HFA, PROAIR HFA) inhaler 2 Puff  2 Puff Inhalation Q6H RT  
 budesonide-formoteroL (SYMBICORT) 160-4.5 mcg/actuation HFA inhaler 2 Puff  2 Puff Inhalation BID RT Review of Systems: A comprehensive review of systems was negative except for that written in the HPI. Objective:  
Physical Exam:  
 
Visit Vitals BP (!) 164/97 (BP Patient Position: At rest) Pulse 80 Temp 99.1 °F (37.3 °C) Resp 20 Ht 5' 4.02\" (1.626 m) Wt 125.2 kg (276 lb 0.3 oz) SpO2 96% BMI 47.35 kg/m² O2 Flow Rate (L/min): 70 l/min O2 Device: Hi flow nasal cannula Temp (24hrs), Av.8 °F (37.1 °C), Min:98.5 °F (36.9 °C), Max:99.1 °F (37.3 °C) No intake/output data recorded. No intake/output data recorded. General:   Awake and alert. High flow nasal cannula in place, obese Lungs:    Crackles bilateral  
Chest wall:  No tenderness or deformity. Heart:  Regular rate and rhythm, S1, S2 normal, no murmur, click, rub or gallop. Abdomen:   Soft, non-tender. Bowel sounds normal. No masses,  No organomegaly. Extremities: Extremities normal, atraumatic, no cyanosis or edema. Pulses: 2+ and symmetric all extremities. Skin: Skin color, texture, turgor normal. No rashes or lesions Neurologic: CNII-XII intact. No gross focal deficits Data Review:  
   
Recent Days: 
No results for input(s): WBC, HGB, HCT, PLT, HGBEXT, HCTEXT, PLTEXT, HGBEXT, HCTEXT, PLTEXT in the last 72 hours. Recent Labs 02/02/21 
1250   
K 4.4  
 CO2 26 * BUN 52* CREA 1.50* CA 9.1 PHOS 3.5 ALB 2.9* No results for input(s): PH, PCO2, PO2, HCO3, FIO2 in the last 72 hours. 24 Hour Results: 
Recent Results (from the past 24 hour(s)) FERRITIN Collection Time: 02/04/21 11:48 AM  
Result Value Ref Range Ferritin 730 (H) 8 - 252 ng/mL XR CHEST PORT Final Result Diffuse confluent opacities in the lungs, mildly improved. XR CHEST PORT Final Result CT CHEST WO CONT Final Result Pronounced patchy diffuse bilateral pneumonitis XR CHEST PORT Final Result XR CHEST PORT    (Results Pending) Assessment: 
COVID-19 with pneumonitis, completed remdesivir, on Decadron Acute respiratory failure with hypoxia, continues on high flow oxygen. She declined BiPAP Diabetes mellitus type 2: uncontrolled,  due to steroid use 
  
COPD without exacerbation 
  
Chronic kidney disease stage III good urine output 
  
Chronic heart failure, details unknown  
  
History of CVA, wheelchair-bound 
  
Hypertension fair controlled Plan: 
Continue Decadron, supplemental oxygen, supportive care I updated her daughter Maury Wang CODE STATUS is DNR Total time spent with patient: 30 minutes.  
 
Annie Ross MD

## 2021-02-05 NOTE — PROGRESS NOTES
Pulmonary, Critical Care Note Name: Tera Hill MRN: [id number] : 1940 Hospital: [facility] Date: 2021  Admission date: 2021 Hospital Day: 9 Subjective/Interval History:  
Seen in the emergency room. Patient was just in the hospital last week with COVID-19 positive hypoxia pneumonitis. She was treated with Decadron Actemra and 1 dose ivermectin. She was weaned to room air with decreasing CRP at the time of discharge. She did well for the first 2 or 3 days but about 2 days ago started noticing worsening shortness of breath again. She had low-grade fever last night 99 4 came to the emergency room was found to have worsening hypoxia and is now on nonrebreather. Chest x-ray shows marked worsening diffuse infiltrates she states she has had some cough mostly nonproductive had nausea vomiting diarrhea states her appetite is unchanged and still has a sense of taste of interest her CRP at the time of discharge 0.87 and today 1.35 her blood cultures from  showed 4 of 4 with coagulase-negative staph aureus. She was an extremely difficult stick and it was felt that this was probably 1 stick in for blood culture bottles and represented skin contaminant. Blood culture from  at the time of discharge was no growth  progressively worsening hypoxia had to place on 100% nasal high flow. She now is running oxygen saturation of 88%. High flow rate increased to 70 L and saturation only 90%  remains on nasal high flow at 100% operations nonlabored sitting at rest she has stated she does not want to be intubated or to use BiPAP 
2/ unchanged remains on 100% nasal high flow with oxygen saturation 94% Hospital Problems  Date Reviewed: 10/6/2020 Codes Class Noted POA COVID-19 ICD-10-CM: U07.1 ICD-9-CM: 079.89  2021 Unknown IMPRESSION:  
1.  Acute Hypoxic respiratory failure remains on high flow nasal oxygen 100% currently with O2 sat 94 
2. Diffuse pulmonary infiltrates pulmonary edema versus community-acquired pneumonia/COVID-19 pneumonitis 2/3 chest x-ray shows improvement primarily on the right with the left side still diffuse infiltrate chest x-ray 2/5 with minimal improvement on the left questionable worsening on the right 3. Recent COVID-19 infection 4. History CHF 5. History diabetes 6. Chronic kidney disease Body mass index is 47.35 kg/m². RECOMMENDATIONS/PLAN:  
1. has completed Remdesivir and received ivermectin and Actemra 2. Continue Decadron 4 mg twice daily 3. Continue Lasix will give extra dose today 4. Continue on inhaled albuterol and Symbicort [x] High complexity decision making was performed [x] See my orders for details Subjective/Initial History:  
 
I was asked by Bell Wolfe MD to see Candace Stands  a 78 y.o.  female in consultation for a chief complaint of hypoxic respiratory failure Covid pneumonitis versus pulmonary edema Allergies Allergen Reactions  Sulfa (Sulfonamide Antibiotics) Angioedema  Contrast Dye [Iodine] Itching MAR reviewed and pertinent medications noted or modified as needed Current Facility-Administered Medications Medication  dexAMETHasone (DECADRON) tablet 4 mg  metoprolol (LOPRESSOR) injection 5 mg  insulin glargine (LANTUS) injection 13 Units  insulin lispro (HUMALOG) injection  dextrose (D50W) injection syrg 12.5-25 g  
 glimepiride (AMARYL) tablet 2 mg  lidocaine (XYLOCAINE) 4 % cream  
 furosemide (LASIX) injection 60 mg  
 heparin (porcine) injection 5,000 Units  glucose chewable tablet 16 g  
 glucagon (GLUCAGEN) injection 1 mg  hydrOXYzine pamoate (VISTARIL) capsule 50 mg  
 sodium chloride (NS) flush 5-40 mL  sodium chloride (NS) flush 5-40 mL  acetaminophen (TYLENOL) tablet 650 mg Or  acetaminophen (TYLENOL) suppository 650 mg  cholecalciferol (VITAMIN D3) (1000 Units /25 mcg) tablet 2,000 Units  albuterol (PROVENTIL HFA, VENTOLIN HFA, PROAIR HFA) inhaler 2 Puff  budesonide-formoteroL (SYMBICORT) 160-4.5 mcg/actuation HFA inhaler 2 Puff Patient PCP: Olivia Lundberg MD 
PMH:  has a past medical history of Arthritis, Congestive heart failure ([facility] Dependence on wheelchair (10/5/2020), DM (diabetes mellitus), type 2 ([address] (10/5/2020), Essential hypertension (10/5/2020), History of CVA (cerebrovascular accident) (10/5/2020), Morbid obesity ([address] (10/5/2020), Pure hypercholesterolemia (10/5/2020), and Urinary incontinence (10/5/2020). She also has no past medical history of History of abuse in adulthood or History of abuse in childhood. PSH:   has a past surgical history that includes hx hysterectomy. FHX: Family history is unknown by patient. SHX:  reports that she has quit smoking. She has never used smokeless tobacco. She reports that she does not drink alcohol or use drugs. Systemic review: 
General states her weight has been same for the last week just noticed fever yesterday with worsening shortness of breath Eyes no double vision or momentary blindness ENT no facial pain over the sinuses or drainage Endocrinologic she has diabetes denies polyuria polydipsia Musculoskeletal some muscle aches and pains unchanged from her baseline no swollen tender joints Neurologic no seizures or syncope Gastrointestinal no nausea or vomiting states her appetite has been unchanged and no change in her sense of taste Genitourinary no pain or discomfort on urination no bleeding Vascular denies ankle edema chest pain diaphoresis or nocturia Respiratory as mentioned worsening shortness of breath minimal cough no sputum production low-grade fever Objective:  
 
Vital Signs: Telemetry:    normal sinus rhythm Intake/Output:  
Visit Vitals /82 (BP 1 Location: Left lower arm) Pulse (!) 38 Temp 98.3 °F (36.8 °C) Resp 24 Ht 5' 4.02\" (1.626 m) Wt 125.2 kg (276 lb 0.3 oz) SpO2 100% BMI 47.35 kg/m² Temp (24hrs), Av.6 °F (37 °C), Min:98.3 °F (36.8 °C), Max:99.1 °F (37.3 °C) O2 Device: Hi flow nasal cannula O2 Flow Rate (L/min): 70 l/min Wt Readings from Last 4 Encounters:  
21 125.2 kg (276 lb 0.3 oz) 21 127 kg (280 lb) No intake or output data in the 24 hours ending 21 1038 Last shift:      No intake/output data recorded. Last 3 shifts: No intake/output data recorded. Physical Exam:  
General:  female; mild distress with tachypnea no accessory muscle use HEENT: NCAT, oral mucosa clear Eyes: anicteric; conjunctiva clear extraocular movements intact Neck: no nodes, obesity obscures determination of JVD Chest: no deformity,  
Cardiac: Regular rate and rhythm no definite murmur Lungs: Rapid shallow respirations clear anteriorly and laterally posterior rales are present Abd: Obese soft positive bowel sounds no tenderness Ext: no edema; no joint swelling; No clubbing : clear urine Neuro: Awake alert seems anxious speech is relatively clear is hemiplegic on the left side moves the right side normally Psych- no agitation, oriented to person;  
Skin: warm, dry, no cyanosis; 
Pulses: Brachial and radial pulses are intact hard to find her femoral due to her obesity Capillary: Capillary refill Labs: 
 
 
Recent Labs 21 
1250   
K 4.4  
 CO2 26 * BUN 52* CREA 1.50* CA 9.1 PHOS 3.5 ALB 2.9* Nasal high flow 100% FiO2 with flow rate 70 L/min with oxygen saturation 98%  CRP 1.35, 2.87, 4.87 
  280 
 ferritin 333, 235 
 4of4 blood cultures with coagulase-negative staph aureus  blood culture no growth Lab Results Component Value Date/Time  Culture result: No growth 6 days 2021 06:30 AM  
 Culture result: No growth 6 days 2021 11:55 AM  
 Culture result: (A) 2021 11:45 AM  
 Staphylococcus species, coagulase negative MULTIPLE COLONY TYPES/STRAINS growing in all 4 bottles drawn Culture result:  01/22/2021 11:45 AM  
  Gram pos cocci in clusters 
growing in 2 of 4 bottles drawn Dr. Joaquin Parrish via Ms. Kenyon Russo on 1.23.21 at 15:25 by tlw Imaging: CXR Results  (Last 48 hours) 01/28/21 0610  XR CHEST PORT Final result Narrative:  1 view comparison 22nd Moderate patchy interstitial infiltrates, midportion of right greater than left  
lung with hypoinflation overall. No effusion or pneumothorax. Normal heart and  
Mediastinum Agree with above diffuse patchy infiltrates marked worsening from previous exam  
  
  
 
Results from [facility] Encounter encounter on 01/28/21 XR CHEST PORT Narrative Chest single view. Comparison single view chest February 3, 2021 Dense alveolar opacities throughout the lungs, advanced finding compared to 
prior imaging. Cardiac and mediastinal structures unchanged. No pneumothorax or 
sizable pleural effusion. XR CHEST PORT Narrative Chest, frontal view, 2/3/2021 History: Covid-19 pneumonia. Comparison: Including chest 1/29/2021. Findings: The cardiac silhouette is stable. The lungs are underexpanded. Diffuse confluent airspace opacities are again seen throughout the lungs with 
mild interval improvement most notably at the perihilar regions. No pleural 
effusion or pneumothorax is identified. The osseous structures are stable. Impression Diffuse confluent opacities in the lungs, mildly improved. XR CHEST PORT Narrative 1 view comparison yesterday Little change in patchy mixed infiltrates throughout each lung, lower volumes 
overall. No effusion. Normal heart and mediastinum Results from [facility] Encounter encounter on 01/28/21 CT CHEST WO CONT  Narrative Noncontrast study shows pronounced patchy variable density groundglass 
opacification and lesser consolidation throughout much of each lung, relatively 
sparing the right lower lobe. Central airways are open. No mediastinal or hilar 
adenopathy. Normal caliber aorta. No pleural pericardial effusion. No 
significant upper abdominal findings Impression Pronounced patchy diffuse bilateral pneumonitis · Discussion: Marked worsening hypoxia and pulmonary infiltrates. CRP is down which certainly goes against worsening Covid pneumonitis. Will give Lasix as mentioned above and follow oxygenation electrolytes and renal function. Agree with empiric treatment for Covid to include Decadron and remdesivir have spoken with infectious disease and will add vancomycin until repeat blood cultures are finalized · 1/29 worsening hypoxia will place on noninvasive ventilator and continue treatment for COVID-19 as well as IV Lasix 1/30 received Lasix yesterday oxygenation is improved. She has stated again that she does not want to be on life support machines or to be intubated. We will continue Lasix for diuresis. So far she is doing well today 2/2 worsening status today tachycardia that did improve with IV Lopressor. We will leave an as needed order. Remains on 100% high flow. Renal function is tolerating daily Lasix we will continue it 2/4 oxygenation improved while I am in the room with nonlabored respirations will decrease Decadron to every 12 hours dosing orally 2/5 chest x-ray unchanged diffuse infiltrates we will give extra dose Lasix today Rachael Clements MD

## 2021-02-05 NOTE — PROGRESS NOTES
Infectious Disease Progress Note Subjective:  
Remains on 100% FIO2, appeared to be in distress during my assessment but denied acute complaints Objective:  
Physical Exam:  
 
Visit Vitals /82 (BP 1 Location: Left lower arm) Pulse (!) 50 Temp 98.3 °F (36.8 °C) Resp 24 Ht 5' 4.02\" (1.626 m) Wt 276 lb 0.3 oz (125.2 kg) SpO2 93% BMI 47.35 kg/m² O2 Flow Rate (L/min): 60 l/min O2 Device: Hi flow nasal cannula Temp (24hrs), Av.6 °F (37 °C), Min:98.3 °F (36.8 °C), Max:99.1 °F (37.3 °C) No intake/output data recorded. No intake/output data recorded. General: NAD, alert, conversant HEENT: NAILA, Moist mucosa, on high flow Lungs: Decreased at the bases no wheeze/rhonchi Heart: S1S2+, RRR, no murmur Abdo: Soft, NT, ND, +BS Exts: No edema, + pulses b/l  
Skin: No wounds, No rashes or lesions Data Review:  
   
Recent Days: 
Recent Labs 21 
3579 WBC 13.0* HGB 13.6 HCT 40.1 * Recent Labs 21 
1364 BUN 77* CREA 1.90* Lab Results Component Value Date/Time C-Reactive protein <0.29 2021 09:50 AM  
  
Microbiology - Blood Cx : Neg  
 
Diagnostics CXR Results  (Last 48 hours) 21 1006  XR CHEST PORT Final result Narrative:  Chest single view. Comparison single view chest February 3, 2021 Dense alveolar opacities throughout the lungs, advanced finding compared to  
prior imaging. Cardiac and mediastinal structures unchanged. No pneumothorax or  
sizable pleural effusion. Assessment/Plan 1. Acute hypoxia due to COVID-19 pneumonitis, suspected CHF and CAP S/p 5 days of Remdesivir, remains on high flow O2 Worsening infiltrates on CXR, ? Superimposed aspiration PNA Afebrile, rising WBC on todays labs (? Steroid effect) Continue on Decadron, will start on empiric Zosyn Routine labs in the morning 2. Suspected CHF exacerbation: Remains on Lasix  
  
3..H Afib w RVR: Rate controlled 4. JOSE LUIS, likely from diuresis, will closely monitor and adjust  
 
No sig improvement in clinical status since admit, worsening infiltrates on CXR, remains on High flow, currently a DNR. I am concerned about progressive decompensation, have asked staff to move pt closer to the nurses station for close monitoring. Fmly up dated by Primary  
  
Don Reza MD 
 
2/5/2021

## 2021-02-05 NOTE — PROGRESS NOTES
Hi kathleen in place. RR 30, o2 sat 82-86. Pt appears short of breath. Pt breathing through mouth. Instructed pt several times to try to breathe through nose. Pt is not following instruction. Called RT to assess. RT advises pt need to switch to BIPAP face mask. Pt refuses, becomes very agitated - pt states 'I would rather die than put the face mask on'. Stayed with pt while she calmed down, resting with eyes closed at this time o2 sat 90% on hi kathleen. Will continue to monitor closely.

## 2021-02-06 NOTE — PROGRESS NOTES
Infectious Disease Progress Note Subjective:  
Pt seen and examined at bedside, continued worsening in clinical status does not want BiPAP due to claustrophobia, or intubation. Now on comfort measures per primary, daughter at bedside Objective:  
Physical Exam:  
 
Visit Vitals BP (!) 159/81 (BP 1 Location: Left lower arm, BP Patient Position: At rest) Pulse (!) 103 Temp 98.3 °F (36.8 °C) Resp 22 Ht 5' 4.02\" (1.626 m) Wt 276 lb 0.3 oz (125.2 kg) SpO2 (!) 77% BMI 47.35 kg/m² O2 Flow Rate (L/min): 4 l/min O2 Device: Nasal cannula Temp (24hrs), Av.4 °F (36.9 °C), Min:98 °F (36.7 °C), Max:98.8 °F (37.1 °C) No intake/output data recorded.  1901 -  0700 In: 220 [P.O.:120; I.V.:100] Out: 1300 [Urine:1300] General: NAD,anxious, not following commands Lungs: Decreased at the bases no wheeze/rhonchi Heart: S1S2+, RRR, no murmur Abdo: Soft, NT, ND, +BS Exts: No edema, + pulses b/l  
Skin: No wounds, No rashes or lesions Data Review:  
   
Recent Days: 
Recent Labs 21 
6357 WBC 13.0* HGB 13.6 HCT 40.1 * Recent Labs 21 
3922 BUN 77* CREA 1.90* Lab Results Component Value Date/Time C-Reactive protein <0.29 2021 09:50 AM  
  
Microbiology - Blood Cx : Neg  
 
Diagnostics CXR Results  (Last 48 hours) 21 1006  XR CHEST PORT Final result Narrative:  Chest single view. Comparison single view chest February 3, 2021 Dense alveolar opacities throughout the lungs, advanced finding compared to  
prior imaging. Cardiac and mediastinal structures unchanged. No pneumothorax or  
sizable pleural effusion. Assessment/Plan 1. Acute hypoxia due to COVID-19 pneumonitis, suspected CHF and CAP S/p 5 days of Remdesivir, Needs BIPAP but declines, currently on NC O2 All antibiotics discontinued, now on comfort measure 2.  Suspected CHF exacerbation: Did not respond to lasix  
  
3..H Afib w RVR: Rate controlled 4. JOSE LUIS, likely from diuresis, will closely monitor and adjust  
 
Continued worsening in clinical status, declines BIPAP or intubation, now on comfort measures. IV Morphine and Ativan ordered for Comfort but pt w/o IV access, oral versions of both meds ordered, discussed w Pharm D. Keon Landaverde MD 
 
2/6/2021

## 2021-02-06 NOTE — PROGRESS NOTES
Hospitalist Progress Note Daily Progress Note: 2/6/2021 Subjective: The patient is seen for follow up. Her condition has deteriorated over the past 12 hours. She started becoming more hypoxic yesterday evening and is now essentially unresponsive with saturations in the 60s. Her family was notified and her daughter is at the bedside. I have spoken with her nephew who is POA and he is requesting a transition over to comfort care only at this time Problem List: 
Problem List as of 2/6/2021 Date Reviewed: 10/6/2020 Codes Class Noted - Resolved COVID-19 ICD-10-CM: U07.1 ICD-9-CM: 079.89  1/28/2021 - Present Acute respiratory failure due to COVID-19 [facility]) ICD-10-CM: U07.1, J96.00 
ICD-9-CM: 518.81, 079.89  1/22/2021 - Present Vitamin D deficiency ICD-10-CM: E55.9 ICD-9-CM: 268.9  10/6/2020 - Present History of CVA (cerebrovascular accident) ICD-10-CM: Z86.73 
ICD-9-CM: V12.54  10/5/2020 - Present Essential hypertension ICD-10-CM: I10 
ICD-9-CM: 401.9  10/5/2020 - Present Dependence on wheelchair ICD-10-CM: Z99.3 ICD-9-CM: V46.3  10/5/2020 - Present Morbid obesity ([facility] [address] ICD-10-CM: E66.01 
ICD-9-CM: 278.01  10/5/2020 - Present Pure hypercholesterolemia ICD-10-CM: E78.00 ICD-9-CM: 272.0  10/5/2020 - Present Urinary incontinence ICD-10-CM: R32 
ICD-9-CM: 788.30  10/5/2020 - Present DM (diabetes mellitus), type 2 ([facility] [address] ICD-10-CM: E11.9 ICD-9-CM: 250.00  10/5/2020 - Present Medications reviewed Current Facility-Administered Medications Medication Dose Route Frequency  LORazepam (ATIVAN) injection 1 mg  1 mg IntraVENous Q15MIN PRN  
 scopolamine (TRANSDERM-SCOP) 1 mg over 3 days 1 Patch  1 Patch TransDERmal Q72H PRN  
 glycopyrrolate (ROBINUL) injection 0.2 mg  0.2 mg IntraVENous Q4H PRN  
 morphine injection 2 mg  2 mg IntraVENous Q15MIN PRN  
 dextrose (D50W) injection syrg 12.5-25 g 25-50 mL IntraVENous PRN  
 glucose chewable tablet 16 g  4 Tab Oral PRN  
 glucagon (GLUCAGEN) injection 1 mg  1 mg IntraMUSCular PRN  
 hydrOXYzine pamoate (VISTARIL) capsule 50 mg  50 mg Oral QID PRN  
 sodium chloride (NS) flush 5-40 mL  5-40 mL IntraVENous Q8H  
 sodium chloride (NS) flush 5-40 mL  5-40 mL IntraVENous PRN  
 acetaminophen (TYLENOL) tablet 650 mg  650 mg Oral Q6H PRN Or  
 acetaminophen (TYLENOL) suppository 650 mg  650 mg Rectal Q6H PRN Review of Systems: A comprehensive review of systems was negative except for that written in the HPI. Objective:  
Physical Exam:  
 
Visit Vitals BP (!) 159/81 (BP 1 Location: Left lower arm, BP Patient Position: At rest) Pulse (!) 103 Temp 98.3 °F (36.8 °C) Resp 22 Ht 5' 4.02\" (1.626 m) Wt 125.2 kg (276 lb 0.3 oz) SpO2 (!) 77% BMI 47.35 kg/m² O2 Flow Rate (L/min): 60 l/min O2 Device: Heated, Hi flow nasal cannula Temp (24hrs), Av.4 °F (36.9 °C), Min:98 °F (36.7 °C), Max:98.8 °F (37.1 °C) No intake/output data recorded.  1901 -  0700 In: 220 [P.O.:120; I.V.:100] Out: 1300 [Urine:1300] General:   Awake and alert. High flow nasal cannula in place, obese Lungs:    Crackles bilateral  
Chest wall:  No tenderness or deformity. Heart:  Regular rate and rhythm, S1, S2 normal, no murmur, click, rub or gallop. Abdomen:   Soft, non-tender. Bowel sounds normal. No masses,  No organomegaly. Extremities: Extremities normal, atraumatic, no cyanosis or edema. Pulses: 2+ and symmetric all extremities. Skin: Skin color, texture, turgor normal. No rashes or lesions Neurologic: CNII-XII intact. No gross focal deficits Data Review:  
   
Recent Days: 
Recent Labs 21 
2187 WBC 13.0* HGB 13.6 HCT 40.1 * Recent Labs 21 
2107   
K 4.2 * CO2 24 * BUN 77* CREA 1.90* CA 9.4 PHOS 4.4 ALB 3.1* No results for input(s): PH, PCO2, PO2, HCO3, FIO2 in the last 72 hours. 24 Hour Results: 
Recent Results (from the past 24 hour(s)) LD Collection Time: 02/05/21  9:50 AM  
Result Value Ref Range  (H) 81 - 246 U/L  
C REACTIVE PROTEIN, QT Collection Time: 02/05/21  9:50 AM  
Result Value Ref Range C-Reactive protein <0.29 0.00 - 0.60 mg/dL CBC WITH AUTOMATED DIFF Collection Time: 02/05/21  9:50 AM  
Result Value Ref Range WBC 13.0 (H) 3.6 - 11.0 K/uL  
 RBC 5.19 3.80 - 5.20 M/uL  
 HGB 13.6 11.5 - 16.0 g/dL HCT 40.1 35.0 - 47.0 % MCV 77.3 (L) 80.0 - 99.0 FL  
 MCH 26.2 26.0 - 34.0 PG  
 MCHC 33.9 30.0 - 36.5 g/dL  
 RDW 15.7 (H) 11.5 - 14.5 % PLATELET 826 (L) 134 - 400 K/uL MPV 10.2 8.9 - 12.9 FL  
 NEUTROPHILS 91 (H) 32 - 75 % LYMPHOCYTES 6 (L) 12 - 49 % MONOCYTES 2 (L) 5 - 13 % EOSINOPHILS 0 0 - 7 % BASOPHILS 0 0 - 1 % IMMATURE GRANULOCYTES 1 (H) 0.0 - 0.5 % ABS. NEUTROPHILS 11.9 (H) 1.8 - 8.0 K/UL  
 ABS. LYMPHOCYTES 0.7 (L) 0.8 - 3.5 K/UL  
 ABS. MONOCYTES 0.3 0.0 - 1.0 K/UL  
 ABS. EOSINOPHILS 0.0 0.0 - 0.4 K/UL  
 ABS. BASOPHILS 0.0 0.0 - 0.1 K/UL  
 ABS. IMM. GRANS. 0.1 (H) 0.00 - 0.04 K/UL  
 DF AUTOMATED RENAL FUNCTION PANEL Collection Time: 02/05/21  9:50 AM  
Result Value Ref Range Sodium 143 136 - 145 mmol/L Potassium 4.2 3.5 - 5.1 mmol/L Chloride 110 (H) 97 - 108 mmol/L  
 CO2 24 21 - 32 mmol/L Anion gap 9 5 - 15 mmol/L Glucose 173 (H) 65 - 100 mg/dL BUN 77 (H) 6 - 20 mg/dL Creatinine 1.90 (H) 0.55 - 1.02 mg/dL BUN/Creatinine ratio 41 (H) 12 - 20 GFR est AA 31 (L) >60 ml/min/1.73m2 GFR est non-AA 25 (L) >60 ml/min/1.73m2 Calcium 9.4 8.5 - 10.1 mg/dL Phosphorus 4.4 2.6 - 4.7 mg/dL Albumin 3.1 (L) 3.5 - 5.0 g/dL XR CHEST PORT Final Result XR CHEST PORT Final Result Diffuse confluent opacities in the lungs, mildly improved. XR CHEST PORT Final Result CT CHEST WO CONT Final Result Pronounced patchy diffuse bilateral pneumonitis XR CHEST PORT Final Result Assessment: 
COVID-19 with pneumonitis, completed remdesivir, on Decadron Acute respiratory failure with hypoxia, continues on high flow oxygen. Markedly worsening Diabetes mellitus type 2: uncontrolled,  due to steroid use 
  
COPD without exacerbation 
  
Chronic kidney disease stage III good urine output 
  
Chronic heart failure, details unknown  
  
History of CVA, wheelchair-bound 
  
Hypertension fair controlled Plan: 
Discontinue all current medications Comfort care only Morphine and lorazepam as needed CODE STATUS is DNR Total time spent with patient: 30 minutes.  
 
Marcelino Lawrence MD

## 2021-02-06 NOTE — PROGRESS NOTES
Rapid response called on patient . Pt heart rate 151. Pt given metoprolol  Po per md order. Pt did not get iv metoprolol secondary to no IV access. Aattempted multiple times by staff and house supervisor Md aware. Ekg was performed. EKG shows Afib with RVR . Pt was given cardizem po per md order. HR went down to 125. Pt states she wants to see her grandson. Supervisor called. Pt oxygen on 100 high flow 89.

## 2021-02-06 NOTE — DEATH NOTE
Physician Death Summary Patient ID: Jamila Thompson 
[id number] 
74 y.o. 
1940 Admit date: 2021 Death date : 2021 Final Diagnoses:  
[id number]-32 [U07.1] with pneumonitis Acute respiratory failure with hypoxia Diabetes mellitus type 2, uncontrolled COPD without exacerbation Chronic diastolic heart failure Chronic kidney disease stage III History of CVA Hypertension Reason for Hospitalization: 
Patient is an 78-year-old female with a history of CHF, diabetes, CVA and hypertension who had previously been admitted here for COVID-19 from . She had initial oxygen requirements of 5 L on that admission although was quickly weaned to room air by discharge. She was given ivermectin and Actemra along with Decadron but did not receive remdesivir as she had minimal oxygen requirements. She presented to the ED 2 days following that discharge with low-grade temperature and worsening shortness of breath. On arrival she had oxygen saturations in the low 80s. Chest x-ray showed patchy interstitial infiltrates bilateral which were worse from previous. She was requiring 15 L high flow oxygen in the ED Hospital Course:  
Patient was admitted to the medical floor. She requested DO NOT RESUSCITATE/DO NOT INTUBATE CODE STATUS She was treated with IV steroids and started on IV remdesivir which she completed 5 days of She was transitioned to high flow nasal cannula at 100% FiO2 and remained on that throughout her hospital stay We tried her on BiPAP but she adamantly refused it and did not tolerate it Unfortunately, her hypoxia did not show any improvement throughout her hospital stay. On the evening of  her oxygen saturations started dropping into the 70s and 80s. She became unresponsive and when I saw her on  family elected to transition over to comfort care only Patient  later that day on  Discharge time spent 35 minutes Signed: 
Jennifer Harmon MD 
2/6/2021 
6:27 PM

## 2021-02-06 NOTE — PROGRESS NOTES
Patient . Death confirmed x2 RNs. Time of death 15:38. Pt's daughter and grand daughter at bedside. MD notified. Nursing supervisor Yani ORO notified. [employer] called and message left. Pt's daughter at bedside given patients jewelry.

## 2021-02-06 NOTE — PROGRESS NOTES
Spoke with MD and Supervisor family wants to take pt home. Arrangements will be made pt in the am to transport pt home safely